# Patient Record
Sex: FEMALE | Race: WHITE | HISPANIC OR LATINO | Employment: UNEMPLOYED | URBAN - METROPOLITAN AREA
[De-identification: names, ages, dates, MRNs, and addresses within clinical notes are randomized per-mention and may not be internally consistent; named-entity substitution may affect disease eponyms.]

---

## 2018-01-14 NOTE — RESULT NOTES
Verified Results  * US ABDOMEN COMPLETE [id number] 11:00AM Ginna Middleton Order Number: [id number]    - Patient Instructions: To schedule this appointment, please contact Central Scheduling at [phone]  Test Name Result Flag Reference   US ABDOMEN COMPLETE (Report)     ABDOMEN ULTRASOUND, COMPLETE     INDICATION: Abdominal pain     COMPARISON: 11/26/2014     TECHNIQUE:  Real-time ultrasound of the abdomen was performed with a curvilinear transducer with both volumetric sweeps and still imaging techniques  FINDINGS:     PANCREAS: The pancreatic tail is obscured by overlying bowel gas  Visualized portions of the pancreas are within normal limits  AORTA AND IVC: Visualized portions are normal for patient age  LIVER:   Size: Within normal range  The liver measures 13 8 cm in the midclavicular line  Contour: Surface contour is smooth  Parenchyma: Echogenicity and echotexture are within normal limits  Again seen is a septated liver cyst measuring 4 7 x 3 3 x 2 7 cm  On a prior liver protocol CT from 2014, this measured 3 8 x 4 0 x 3 2 cm  There are no solid vascular components  The main portal vein is patent and hepatopetal       BILIARY:   The patient is status post cholecystectomy  No intrahepatic biliary dilatation  CBD measures 2 mm  No choledocholithiasis  KIDNEY:    Right kidney measures 11 7 x 4 7 cm  Within normal limits  Left kidney measures 11 5 x 5 2 cm  There is a midpole cyst measuring 1 2 x 1 0 x 1 1 cm  SPLEEN:    Measures 8 5 cm  Within normal limits  ASCITES: None         IMPRESSION:     1  Stable septated liver cyst      2  Small left renal cyst        Workstation performed: [id number]     Signed by:   Kay Reagan MD   10/25/16

## 2018-04-14 ENCOUNTER — HOSPITAL ENCOUNTER (EMERGENCY)
Facility: HOSPITAL | Age: 63
Discharge: HOME | End: 2018-04-14
Attending: EMERGENCY MEDICINE
Payer: COMMERCIAL

## 2018-04-14 ENCOUNTER — APPOINTMENT (EMERGENCY)
Dept: RADIOLOGY | Facility: HOSPITAL | Age: 63
End: 2018-04-14
Payer: COMMERCIAL

## 2018-04-14 ENCOUNTER — APPOINTMENT (EMERGENCY)
Dept: CT IMAGING | Facility: HOSPITAL | Age: 63
End: 2018-04-14
Payer: COMMERCIAL

## 2018-04-14 VITALS
BODY MASS INDEX: 25.68 KG/M2 | HEART RATE: 76 BPM | RESPIRATION RATE: 20 BRPM | TEMPERATURE: 98.7 F | WEIGHT: 154.32 LBS | OXYGEN SATURATION: 96 % | SYSTOLIC BLOOD PRESSURE: 137 MMHG | DIASTOLIC BLOOD PRESSURE: 87 MMHG

## 2018-04-14 DIAGNOSIS — K76.89 LIVER CYST: ICD-10-CM

## 2018-04-14 DIAGNOSIS — R10.9 ABDOMINAL PAIN: Primary | ICD-10-CM

## 2018-04-14 DIAGNOSIS — K21.9 GERD (GASTROESOPHAGEAL REFLUX DISEASE): ICD-10-CM

## 2018-04-14 DIAGNOSIS — K52.9 GASTROENTERITIS: ICD-10-CM

## 2018-04-14 LAB
ALBUMIN SERPL BCP-MCNC: 3.4 G/DL (ref 3.5–5)
ALP SERPL-CCNC: 107 U/L (ref 46–116)
ALT SERPL W P-5'-P-CCNC: 26 U/L (ref 12–78)
ANION GAP SERPL CALCULATED.3IONS-SCNC: 9 MMOL/L (ref 4–13)
APTT PPP: 33 SECONDS (ref 23–35)
AST SERPL W P-5'-P-CCNC: 16 U/L (ref 5–45)
ATRIAL RATE: 81 BPM
BACTERIA UR QL AUTO: ABNORMAL /HPF
BASOPHILS # BLD MANUAL: 0 THOUSAND/UL (ref 0–0.1)
BASOPHILS NFR MAR MANUAL: 0 % (ref 0–1)
BILIRUB SERPL-MCNC: 0.5 MG/DL (ref 0.2–1)
BILIRUB UR QL STRIP: NEGATIVE
BUN SERPL-MCNC: 14 MG/DL (ref 5–25)
CALCIUM SERPL-MCNC: 9.1 MG/DL
CHLORIDE SERPL-SCNC: 107 MMOL/L (ref 100–108)
CLARITY UR: CLEAR
CO2 SERPL-SCNC: 27 MMOL/L (ref 21–32)
COLOR UR: YELLOW
CREAT SERPL-MCNC: 0.84 MG/DL (ref 0.6–1.3)
EOSINOPHIL # BLD MANUAL: 0.09 THOUSAND/UL (ref 0–0.4)
EOSINOPHIL NFR BLD MANUAL: 1 % (ref 0–6)
ERYTHROCYTE [DISTWIDTH] IN BLOOD BY AUTOMATED COUNT: 14 % (ref 11.6–15.1)
GFR SERPL CREATININE-BSD FRML MDRD: 75 ML/MIN/1.73SQ M
GLUCOSE SERPL-MCNC: 92 MG/DL (ref 65–140)
GLUCOSE UR STRIP-MCNC: NEGATIVE MG/DL
HCT VFR BLD AUTO: 36.3 % (ref 34.8–46.1)
HGB BLD-MCNC: 11.8 G/DL (ref 11.5–15.4)
HGB UR QL STRIP.AUTO: ABNORMAL
INR PPP: 1.04 (ref 0.86–1.16)
KETONES UR STRIP-MCNC: NEGATIVE MG/DL
LACTATE SERPL-SCNC: 0.7 MMOL/L (ref 0.5–2)
LEUKOCYTE ESTERASE UR QL STRIP: NEGATIVE
LIPASE SERPL-CCNC: 93 U/L (ref 73–393)
LYMPHOCYTES # BLD AUTO: 3.05 THOUSAND/UL (ref 0.6–4.47)
LYMPHOCYTES # BLD AUTO: 33 % (ref 14–44)
MCH RBC QN AUTO: 29.7 PG (ref 26.8–34.3)
MCHC RBC AUTO-ENTMCNC: 32.5 G/DL (ref 31.4–37.4)
MCV RBC AUTO: 91 FL (ref 82–98)
MONOCYTES # BLD AUTO: 0.74 THOUSAND/UL (ref 0–1.22)
MONOCYTES NFR BLD: 8 % (ref 4–12)
NEUTROPHILS # BLD MANUAL: 5.35 THOUSAND/UL (ref 1.85–7.62)
NEUTS SEG NFR BLD AUTO: 58 % (ref 43–75)
NITRITE UR QL STRIP: NEGATIVE
NON-SQ EPI CELLS URNS QL MICRO: ABNORMAL /HPF
P AXIS: 23 DEGREES
PH UR STRIP.AUTO: 6 [PH] (ref 4.5–8)
PLATELET # BLD AUTO: 338 THOUSANDS/UL (ref 149–390)
PLATELET BLD QL SMEAR: ADEQUATE
PMV BLD AUTO: 10.1 FL (ref 8.9–12.7)
POTASSIUM SERPL-SCNC: 3.6 MMOL/L (ref 3.5–5.3)
PR INTERVAL: 134 MS
PROT SERPL-MCNC: 7.6 G/DL (ref 6.4–8.2)
PROT UR STRIP-MCNC: NEGATIVE MG/DL
PROTHROMBIN TIME: 13.9 SECONDS (ref 12.1–14.4)
QRS AXIS: -4 DEGREES
QRSD INTERVAL: 84 MS
QT INTERVAL: 356 MS
QTC INTERVAL: 406 MS
RBC # BLD AUTO: 3.97 MILLION/UL (ref 3.81–5.12)
RBC #/AREA URNS AUTO: ABNORMAL /HPF
SODIUM SERPL-SCNC: 143 MMOL/L (ref 136–145)
SP GR UR STRIP.AUTO: 1.02 (ref 1–1.03)
T WAVE AXIS: 18 DEGREES
TOTAL CELLS COUNTED SPEC: 100
UROBILINOGEN UR QL STRIP.AUTO: 0.2 E.U./DL
VENTRICULAR RATE: 78 BPM
WBC # BLD AUTO: 9.23 THOUSAND/UL (ref 4.31–10.16)
WBC #/AREA URNS AUTO: ABNORMAL /HPF

## 2018-04-14 PROCEDURE — 96376 TX/PRO/DX INJ SAME DRUG ADON: CPT

## 2018-04-14 PROCEDURE — 85027 COMPLETE CBC AUTOMATED: CPT | Performed by: EMERGENCY MEDICINE

## 2018-04-14 PROCEDURE — 96375 TX/PRO/DX INJ NEW DRUG ADDON: CPT

## 2018-04-14 PROCEDURE — 83605 ASSAY OF LACTIC ACID: CPT | Performed by: EMERGENCY MEDICINE

## 2018-04-14 PROCEDURE — 71046 X-RAY EXAM CHEST 2 VIEWS: CPT

## 2018-04-14 PROCEDURE — 81001 URINALYSIS AUTO W/SCOPE: CPT

## 2018-04-14 PROCEDURE — 96361 HYDRATE IV INFUSION ADD-ON: CPT

## 2018-04-14 PROCEDURE — 36415 COLL VENOUS BLD VENIPUNCTURE: CPT | Performed by: EMERGENCY MEDICINE

## 2018-04-14 PROCEDURE — 83690 ASSAY OF LIPASE: CPT | Performed by: EMERGENCY MEDICINE

## 2018-04-14 PROCEDURE — 93010 ELECTROCARDIOGRAM REPORT: CPT | Performed by: INTERNAL MEDICINE

## 2018-04-14 PROCEDURE — 85610 PROTHROMBIN TIME: CPT | Performed by: EMERGENCY MEDICINE

## 2018-04-14 PROCEDURE — 96374 THER/PROPH/DIAG INJ IV PUSH: CPT

## 2018-04-14 PROCEDURE — 93005 ELECTROCARDIOGRAM TRACING: CPT

## 2018-04-14 PROCEDURE — 99285 EMERGENCY DEPT VISIT HI MDM: CPT

## 2018-04-14 PROCEDURE — 85007 BL SMEAR W/DIFF WBC COUNT: CPT | Performed by: EMERGENCY MEDICINE

## 2018-04-14 PROCEDURE — 85730 THROMBOPLASTIN TIME PARTIAL: CPT | Performed by: EMERGENCY MEDICINE

## 2018-04-14 PROCEDURE — 80053 COMPREHEN METABOLIC PANEL: CPT | Performed by: EMERGENCY MEDICINE

## 2018-04-14 PROCEDURE — 87040 BLOOD CULTURE FOR BACTERIA: CPT | Performed by: EMERGENCY MEDICINE

## 2018-04-14 PROCEDURE — 74177 CT ABD & PELVIS W/CONTRAST: CPT

## 2018-04-14 RX ORDER — ESOMEPRAZOLE MAGNESIUM 40 MG/1
40 CAPSULE, DELAYED RELEASE ORAL
COMMUNITY
End: 2018-10-25

## 2018-04-14 RX ORDER — SODIUM CHLORIDE 9 MG/ML
125 INJECTION, SOLUTION INTRAVENOUS CONTINUOUS
Status: DISCONTINUED | OUTPATIENT
Start: 2018-04-14 | End: 2018-04-14 | Stop reason: HOSPADM

## 2018-04-14 RX ORDER — ONDANSETRON 2 MG/ML
4 INJECTION INTRAMUSCULAR; INTRAVENOUS ONCE
Status: COMPLETED | OUTPATIENT
Start: 2018-04-14 | End: 2018-04-14

## 2018-04-14 RX ORDER — MAGNESIUM HYDROXIDE/ALUMINUM HYDROXICE/SIMETHICONE 120; 1200; 1200 MG/30ML; MG/30ML; MG/30ML
20 SUSPENSION ORAL ONCE
Status: COMPLETED | OUTPATIENT
Start: 2018-04-14 | End: 2018-04-14

## 2018-04-14 RX ORDER — RISPERIDONE 0.5 MG/1
0.5 TABLET, FILM COATED ORAL 2 TIMES DAILY
COMMUNITY
End: 2018-10-25

## 2018-04-14 RX ORDER — LOSARTAN POTASSIUM 100 MG/1
TABLET ORAL
COMMUNITY
Start: 2014-04-20 | End: 2018-12-03 | Stop reason: SDUPTHER

## 2018-04-14 RX ORDER — DICYCLOMINE HCL 20 MG
20 TABLET ORAL EVERY 6 HOURS
Qty: 20 TABLET | Refills: 0 | Status: SHIPPED | OUTPATIENT
Start: 2018-04-14 | End: 2018-10-25

## 2018-04-14 RX ORDER — METOCLOPRAMIDE 10 MG/1
10 TABLET ORAL 4 TIMES DAILY
Qty: 28 TABLET | Refills: 0 | Status: SHIPPED | OUTPATIENT
Start: 2018-04-14 | End: 2018-04-21

## 2018-04-14 RX ORDER — TRAZODONE HYDROCHLORIDE 50 MG/1
50 TABLET ORAL
COMMUNITY
End: 2018-10-25

## 2018-04-14 RX ORDER — SUCRALFATE 1 G/1
1 TABLET ORAL 4 TIMES DAILY
Qty: 20 TABLET | Refills: 0 | Status: SHIPPED | OUTPATIENT
Start: 2018-04-14 | End: 2018-10-25

## 2018-04-14 RX ADMIN — ONDANSETRON 4 MG: 2 INJECTION INTRAMUSCULAR; INTRAVENOUS at 04:19

## 2018-04-14 RX ADMIN — ALUMINUM HYDROXIDE, MAGNESIUM HYDROXIDE, AND SIMETHICONE 20 ML: 200; 200; 20 SUSPENSION ORAL at 06:21

## 2018-04-14 RX ADMIN — LIDOCAINE HYDROCHLORIDE 15 ML: 20 SOLUTION ORAL; TOPICAL at 06:20

## 2018-04-14 RX ADMIN — SODIUM CHLORIDE 125 ML/HR: 0.9 INJECTION, SOLUTION INTRAVENOUS at 05:30

## 2018-04-14 RX ADMIN — FAMOTIDINE 20 MG: 10 INJECTION INTRAVENOUS at 04:19

## 2018-04-14 RX ADMIN — SODIUM CHLORIDE 1000 ML: 0.9 INJECTION, SOLUTION INTRAVENOUS at 04:13

## 2018-04-14 RX ADMIN — HYDROMORPHONE HYDROCHLORIDE 0.5 MG: 1 INJECTION, SOLUTION INTRAMUSCULAR; INTRAVENOUS; SUBCUTANEOUS at 06:21

## 2018-04-14 RX ADMIN — IOHEXOL 50 ML: 240 INJECTION, SOLUTION INTRATHECAL; INTRAVASCULAR; INTRAVENOUS; ORAL at 04:25

## 2018-04-14 RX ADMIN — IOHEXOL 100 ML: 350 INJECTION, SOLUTION INTRAVENOUS at 06:10

## 2018-04-14 RX ADMIN — HYDROMORPHONE HYDROCHLORIDE 0.5 MG: 1 INJECTION, SOLUTION INTRAMUSCULAR; INTRAVENOUS; SUBCUTANEOUS at 04:19

## 2018-04-14 NOTE — DISCHARGE INSTRUCTIONS
Dolor abdominal   LO QUE NECESITA SABER:   El dolor abdominal puede ser sordo, [place], o himanshu  Usted puede sentir dolor localizado en sarwat ese área del abdomen o en todo el abdomen  El dolor puede ser causado por sarwat afección cookie estreñimiento, sensibilidad o intoxicación alimentaria, infección o sarwat obstrucción  Asimismo, el dolor abdominal puede deberse a sarwat hernia, apendicitis o Gale Laughter  Las enfermedades del hígado, la vesícula o el riñón también pueden causar dolor abdominal  La causa del dolor abdominal puede ser desconocida  INSTRUCCIONES SOBRE EL JOSE [facility]O HOSPITAL[facility]IA:   Regrese a la zion de emergencias si:   · Usted comienza a sentir un dolor en el pecho o dificultad para respirar que antes no sentía  · Usted siente un dolor con pulsaciones en la parte superior del abdomen o en la parte inferior de la espalda que de repente se vuelve john  · El dolor se localiza en la parte inferior derecha del abdomen y [place] cuando se [place]  · Usted tiene fiebre por encima de los 100 4 °F (38 °C) o escalofríos  · Usted tiene vómitos y no puede retener líquidos ni alimentos en el estómago  · El dolor no mejora o empeora en las próximas 8 a 12 horas  · Usted nota sharda en bowers vómito o heces, o éstas tienen un aspecto negruzco y alquitranado  · Bowers piel o las partes serjio de clarita ojos se vuelven amarillentas  · Si usted es sarwat niurka y presenta abundante sangrado vaginal que no es bowers menstruación  Pregúntele a bowers Claryce Levans vitaminas y minerales son adecuados para usted  · Usted siente dolor en la parte inferior de la espalda  · Usted es Carline Ramming y tiene dolor en los testículos  · Siente dolor al Tatiana Markham  · Usted tiene preguntas o inquietudes acerca de bowers condición o cuidado  Acuda en 24 horas a sarwat damaso de seguimiento con bowers médico o cookie se le indique:  Anote clarita preguntas para que se acuerde de hacerlas camilla clarita visitas     Medicamentos:   · Corey Age ser administrados para calmar sandoval estómago y prevenir los vómitos o para disminuir el dolor  Pregunte cómo se debe chandana los analgésicos de forma avilez  · [place] clarita medicamentos cookie se le haya indicado  Consulte con sandoval médico si usted rufus que sandoval medicamento no le está ayudando o si presenta efectos secundarios  Infórmele si es alérgico a algún medicamento  Mantenga sarwat lista actualizada de los [place], las vitaminas y los productos herbales que jeanie  Incluya los siguientes datos de los medicamentos: cantidad, frecuencia y motivo de administración  Traiga con usted la lista o los envases de la píldoras a clarita citas de seguimiento  Lleve la lista de los medicamentos con usted en alejandro de sarwat emergencia  © 2017 [address]  Information is for End User's use only and may not be sold, redistributed or otherwise used for commercial purposes  All illustrations and images included in CareNotes® are the copyrighted property of A D A M , Inc  or Parth Galan  Esta información es sólo para uso en educación  Sandoval intención no es darle un consejo médico sobre enfermedades o tratamientos  Colsulte con sandoval Catherne Ryan farmacéutico antes de seguir cualquier régimen médico para saber si es seguro y efectivo para usted  Gastroenteritis   LO QUE NECESITA SABER:   La gastroenteritis, o gripe estomacal, es sarwat infección del estómago y los intestinos  INSTRUCCIONES SOBRE EL JOSE [facility]DRO HOSPITALARIA:   Llame al 911 en alejandro de presentar lo siguiente:   · Usted tiene dificultad para respirar o pulso acelerado  Regrese a la zion de emergencias si:   · Usted ve sharda en sandoval diarrea  · Usted no puede dejar de vomitar  · Usted no ha orinado en 12 horas  · Usted siente que se va a desmayar  Pregúntele a sandoval [place] vitaminas y minerales son adecuados para usted  · Usted tiene fiebre  · Usted continúa con vómitos o diarrea aún después del tratamiento      · Usted ve lombrices en sandoval diarrea  · Bowers boca u ojos están secos  Usted no está orinando tanto o con la misma frecuencia  · Usted tiene preguntas o inquietudes acerca de bowers condición o cuidado  Medicamentos:   · Medicamentos,  se pueden administrar para [employer] vómitos o la diarrea, disminuir los calambres abdominales o tratar sarwat infección  · [place] clarita medicamentos cookie se le haya indicado  Consulte con bowers médico si usted rufus que bowers medicamento no le está ayudando o si presenta efectos secundarios  Infórmele si es alérgico a cualquier medicamento  Mantenga sarwat lista actualizada de los [place], las vitaminas y los productos herbales que jeanie  Incluya los siguientes datos de los medicamentos: cantidad, frecuencia y motivo de administración  Traiga con usted la lista o los envases de la píldoras a clarita citas de seguimiento  Lleve la lista de los medicamentos con usted en alejandro de sarwat emergencia  El [place] de bowers síntomas:   · [place]al líquidos cookie se le haya indicado  Pregunte a bowers médico qué cantidad de líquido debe beber a diario y qué líquidos le recomienda  Es posible que también necesite chandana sarwat solución de rehidratación oral (SRO)  Sarwat SRO contiene la cantidad [place] de azúcar, sal y minerales en agua para reponer los líquidos corporales  · Consuma alimentos blandos  Cuando usted sienta [place], empiece a comer alimentos suaves y blandos  Ejemplos son los plátanos, sopas claras, vilma y puré de [place]  No consuma productos lácteos, alcohol, bebidas azucaradas, o bebidas con cafeína hasta que se sienta mejor  · Descanse el mayor tiempo posible  Cuando se empiece a sentir mejor, comience poco a poco a hacer más cada día  Evite la propagación de la gastroenteritis:  La gastroenteritis se puede propagar fácilmente  Manténgase usted, bowers jeb y clarita alrededores limpios para ayudar a evitar la propagación de la gastroenteritis:  · Lávese las semaj frecuentemente  Utilice agmomo y [place]   [address] usar el baño, cambiarle el pañal a un reggie o estornudar  Lávese las semaj antes de comer o preparar alimentos  · Limpie las superficies y lave la ropa con frecuencia  Lave sandoavl ropa y clarita toallas por separado del tk de la ropa  Limpie las superficies de sandoval hogar con limpiador antibacterial o con blanqueador  · Lave y cocine sagar los alimentos  Lave las verduras crudas antes de cocinar  [address] y [place]  No utilice los mismos platos para las andre crudas que para otros alimentos  Ponga en el refrigerador inmediatamente cualquier alimento que haya sobrado  · Esté alerta cuando usted vaya de campamento o cuando viaje  Solamente tome agua limpia  No tome agua de los kurt o tarik a menos que usted purifique o hierva el agua karlos  Cuando viaje, tome agua embotellada y no le ponga hielo  No coma fruta con la cáscara  No coma pescado crudo o andre que no están cocinadas completamente  Acuda a clarita consultas de control con sandoval médico según le indicaron  Anote clarita preguntas para que se acuerde de hacerlas camilla clarita visitas  © 2017 [address]  Information is for End User's use only and may not be sold, redistributed or otherwise used for commercial purposes  All illustrations and images included in CareNotes® are the copyrighted property of A D A M , Inc  or Parth Galan  Esta información es sólo para uso en educación  Sandoval intención no es darle un consejo médico sobre enfermedades o tratamientos  Colsulte con sandoval Catherne Ryan farmacéutico antes de seguir cualquier régimen médico para saber si es seguro y efectivo para usted  Enfermedad por reflujo gastroesofágico   LO QUE NECESITA SABER:   La enfermedad por reflujo gastroesofágico ([place]ick[place] Lax) ocurre cuando el ácido y los alimentos en el estómago regresan al esófago  La enfermedad por reflujo gastroesofágico es el reflujo que se produce más de [address] a la semana camilla varias semanas  Generalmente causa acidez y otros síntomas  La ERGE puede causar otros problemas de emerald con el tiempo si no es tratada  INSTRUCCIONES SOBRE EL JOSE ALEJANDRO HOSPITALARIA:   Regrese a la zion de emergencias si:   · Usted siente Alaina Come y no puede eructar o vomitar  · Usted siente dolor rea en el pecho y dificultad repentina para respirar  · Clarita evacuaciones intestinales son de color jolynn, con [place], o de apariencia alquitranada  · Bowers vómito parece cookie café molido o contiene sharda  Pregúntele a bowers [place] vitaminas y minerales son adecuados para usted  · Vomita grandes cantidades, o vomita con frecuencia  · Tiene dificultad para respirar después de vomitar  · Tiene dificultad para tragar o dolor al tragar  · Usted pierde peso sin proponérselo  · Clarita síntomas empeoran o no mejoran con el tratamiento  · Usted tiene preguntas o inquietudes acerca de bowers condición o cuidado  Medicamentos:   · Medicamentos,  se utilizan para disminuir el ácido [place] medicamentos también podrían usarse para ayudar a que bowers esfínter esofágico inferior y bowers estómago se contraigan (estrechen) más  · [place] clarita medicamentos cookie se le haya indicado  Consulte con bowers médico si usted rufus que bowers medicamento no le está ayudando o si presenta efectos secundarios  Infórmele si es alérgico a algún medicamento  Mantenga sarwat lista actualizada de los [place], las vitaminas y los productos herbales que jeanie  Incluya los siguientes datos de los medicamentos: cantidad, frecuencia y motivo de administración  Traiga con usted la lista o los envases de la píldoras a clarita citas de seguimiento  Lleve la lista de los medicamentos con usted en alejandro de sarwat emergencia  Control de la ERGE:   · No consuma alimentos o bebidas que puedan aumentar la [place]  Estos incluyen chocolate, menta, comidas fritas o grasosas, bebidas que contienen cafeína o bebidas gaseosas   Otros alimentos incluyen comidas picantes, cebollas, tomates y alimentos a base de tomate  No consuma alimentos y bebidas que puedan irritar sandoval esófago, cookie las frutas cítricas, los jugos y las bebidas alcohólicas  · No ingiera comidas abundantes  Cuando usted come [employer] a la vez, sandoval estómago necesita más ácido para digerirla  Consuma 6 comidas pequeñas al día en vez de 3 comidas grandes y coma lentamente  No consuma alimentos entre 2 y 3 horas antes de [place]  · Eleve la cabecera de sandoval cama  Coloque bloques de 6 pulgadas debajo de la cabecera de la estructura de sandoval cama  También podría usar más sarwat almohada para apoyar sandoval paco y hombros mientras duerme  · Mantenga un peso saludable  Si usted tiene sobrepeso, la pérdida de peso podría ayudar a aliviar los síntomas de la Valentina Rajas  · No fume  Fumar debilita el esfínter esofágico inferior y [place] el riesgo de Valentina Rajas  Pida información a sandoval médico si usted actualmente fuma y necesita ayuda para dejar de fumar  Los cigarrillos electrónicos o tabaco sin humo todavía contienen nicotina  Consulte con sandoval médico antes de [employer]  · No use ropa que [place] alrededor de la cintura  La ropa apretada puede ejercer presión [employer] y causar o empeorar los síntomas de la Valentina Rajas  Acuda a clarita consultas de control con sandoval médico según le indicaron  Anote clarita preguntas para que se acuerde de hacerlas camilla clarita visitas  © 2017 [facility] Information is for End User's use only and may not be sold, redistributed or otherwise used for commercial purposes  All illustrations and images included in CareNotes® are the copyrighted property of A D A M , Inc  or Parth Galan  Esta información es sólo para uso en educación  Sandoval intención no es darle un consejo médico sobre enfermedades o tratamientos  Colsulte con sandoval Vasquez Cranker farmacéutico antes de seguir cualquier régimen médico para saber si es seguro y efectivo para usted

## 2018-04-14 NOTE — ED NOTES
PAIN IMPROVED, spoke with pt daughter regarding clear liquid diet x 2 days and then bland diet with understanding of same     Cammie Rodriguez RN  04/14/18 [id number]

## 2018-04-14 NOTE — ED NOTES
Patient transported to [facility] [address], RN  04/14/18 5904

## 2018-04-19 LAB
BACTERIA BLD CULT: NORMAL
BACTERIA BLD CULT: NORMAL

## 2018-04-26 LAB
CHOLEST SERPL-MCNC: 176 MG/DL
CHOLEST/HDLC SERPL: 4.5 {RATIO}
HDLC SERPL-MCNC: 39 MG/DL
LDL/HDL RATIO (HISTORICAL): 3
LDLC SERPL CALC-MCNC: 118 MG/DL
TRIGL SERPL-MCNC: 94 MG/DL
VLDLC SERPL CALC-MCNC: 19 MG/DL (ref 0–40)

## 2018-07-17 ENCOUNTER — HOSPITAL ENCOUNTER (EMERGENCY)
Facility: HOSPITAL | Age: 63
Discharge: HOME | End: 2018-07-17
Attending: EMERGENCY MEDICINE
Payer: COMMERCIAL

## 2018-07-17 VITALS
OXYGEN SATURATION: 96 % | TEMPERATURE: 98.6 F | DIASTOLIC BLOOD PRESSURE: 80 MMHG | WEIGHT: 148 LBS | HEART RATE: 102 BPM | SYSTOLIC BLOOD PRESSURE: 127 MMHG | RESPIRATION RATE: 16 BRPM | BODY MASS INDEX: 24.63 KG/M2

## 2018-07-17 DIAGNOSIS — L04.0 ACUTE CERVICAL ADENITIS: ICD-10-CM

## 2018-07-17 DIAGNOSIS — J02.9 PHARYNGITIS: Primary | ICD-10-CM

## 2018-07-17 PROCEDURE — 99282 EMERGENCY DEPT VISIT SF MDM: CPT

## 2018-07-17 PROCEDURE — 96372 THER/PROPH/DIAG INJ SC/IM: CPT

## 2018-07-17 RX ORDER — DEXAMETHASONE SODIUM PHOSPHATE 10 MG/ML
10 INJECTION, SOLUTION INTRAMUSCULAR; INTRAVENOUS ONCE
Status: COMPLETED | OUTPATIENT
Start: 2018-07-17 | End: 2018-07-17

## 2018-07-17 RX ADMIN — DEXAMETHASONE SODIUM PHOSPHATE 10 MG: 10 INJECTION, SOLUTION INTRAMUSCULAR; INTRAVENOUS at 03:36

## 2018-07-17 RX ADMIN — PENICILLIN G BENZATHINE 1.2 MILLION UNITS: 1200000 INJECTION, SUSPENSION INTRAMUSCULAR at 03:36

## 2018-07-17 NOTE — DISCHARGE INSTRUCTIONS
Faringitis   LO QUE NECESITA SABER:   La faringitis o dolor de garganta es la inflamación de los tejidos y estructuras en bowers faringe (garganta)  La faringitis es generalmente causada por sarwat bacteria  También podría ser causada por un resfriado o el virus de la gripe  Otras causas incluyen el fumar, las alergias o el reflujo estomacal    INSTRUCCIONES SOBRE EL JOSE ALEJANDRO HOSPITALARIA:   Llame al 911 en alejandro de presentar lo siguiente:   · Usted tiene dificultad para respirar o tragar porque bowers garganta está inflamada o adolorida  Regrese a la zion de emergencias si:   · Usted está babeando porque le duele demasiado tragar  · Usted tiene fiebre por encima de 102? F (39?C) o le dura más de 3 días  · Usted está confundido  · Usted siente sabor a sharda en bowers garganta  Pregúntele a bowers Jyothi Dumont vitaminas y minerales son adecuados para usted  · Bowers dolor de garganta empeora  · Usted tiene un bulto adolorido en bowers garganta que no se tracy después de 5 días  · Evelin síntomas no mejoran después de 5 días  · Usted tiene preguntas o inquietudes acerca de bowers condición o cuidado  Medicamentos:  La faringitis viral desaparecerá por sí ese sin necesidad de tratamiento  Bowers dolor de garganta empezará a mejorar dentro de 3 a 5 días en ambos casos de infecciones virales o bacteriales  Es posible que usted necesite alguno de los siguientes:  · Antibióticos  tratan las infecciones bacteriales  · AINEs (Analgésicos antiinflamatorios no esteroides) cookie el ibuprofeno, ayudan a disminuir la inflamación, el dolor y la [place]  Los AINEs pueden causar sangrado estomacal o problemas renales en ciertas personas  Si usted jeanie un medicamento anticoagulante, siempre pregúntele a bowers médico si los CATHY son seguros para usted  Siempre robinson la etiqueta de onur medicamento y [place] instrucciones  · El acetaminofén  [place] el dolor y baja la fiebre  Está disponible sin receta médica   Pregunte la cantidad y la frecuencia con que debe tomarlos  [address]  El acetaminofén puede causar daño en el hígado cuando no se jeanie de forma correcta  · [place] clarita medicamentos cookie se le haya indicado  Consulte con bowers médico si usted rufus que bowers medicamento no le está ayudando o si presenta efectos secundarios  Infórmele si es alérgico a cualquier medicamento  Mantenga sarwat lista actualizada de los [place], las vitaminas y los productos herbales que jeanie  Incluya los siguientes datos de los medicamentos: cantidad, frecuencia y motivo de administración  Traiga con usted la lista o los envases de la píldoras a clarita citas de seguimiento  Lleve la lista de los medicamentos con usted en alejandro de sarwat emergencia  El [place] de bowers síntomas:   · Nathaniel gárgaras de agua con sal   Mezcle ¼ de cucharadita de sal en un vaso con 8 onzas de agua tibia y nathaniel gárgaras  [place] podría ayudar a reducir la inflamación en bowers garganta  · [address] se le haya indicado  Es posible que usted necesite ingerir mas líquidos de lo habitual  Los líquidos pueden ayudar a aliviar bowers garganta y prevenir la deshidratación  Pregunte cuánto líquido debe chandana cada día y cuáles líquidos son los más adecuados para usted  · Use un humidificador de vapor frío  para ayudar a humedecer el aire en bowers habitación y Francetta Rust bowers tos  · Alivie bowers garganta  con pastillas para la tos, hielo y alimentos blandos o helados de [place]  Prevenga la propagación de la faringitis:  Cúbrase la boca y Cristin [place] and Zeynep cuando tose o estornuda  No comparta alimentos o bebidas  Lávese las semaj frecuentemente  Utilice agua y [place]  Si no tiene agua y jabón disponibles, entonces puede usar un alcohol para semaj en gel  Acuda a clarita consultas de control con bowers médico según le indicaron  Anote clarita preguntas para que se acuerde de hacerlas camilla clarita visitas     © 2017 [address] V[address] Information is for End User's use only and may not be sold, redistributed or otherwise used for commercial purposes  All illustrations and images included in CareNotes® are the copyrighted property of A D A M , Inc  or Parth Galan  Esta información es sólo para uso en educación  Bowers intención no es darle un consejo médico sobre enfermedades o tratamientos  Colsulte con bowers Dewain Racer farmacéutico antes de seguir cualquier régimen médico para saber si es seguro y efectivo para usted  Adenitis   LO QUE NECESITA SABER:   La adenitis es sarwat condición que provoca que los ganglios linfáticos se inflamen y estén sensibles  Usted también podría tener fiebre  La adenitis es un signo de infección que usualmente es a causa de sarwat bacteria  INSTRUCCIONES SOBRE EL JOSE ALEJANDRO HOSPITALARIA:   Medicamentos:  Es posible que usted necesite alguno de los siguientes:  · Antibióticos  tratarán bowers infección bacteriana  · Los AINEs o el acetaminofén  le ayudarán a disminuir el dolor, la inflamación y la [place]  Estos medicamentos están disponibles sin receta médica  Los AINEs pueden causar sangrado estomacal o problemas renales en ciertas personas  Si usted esta tomando un anticoágulante,  siempre  pregunte si los AINEs son seguros para usted  Siempre robinson la etiqueta de onur medicamento y [place] instrucciones  No administre onur medicamento a niños menores de 6 meses de tj sin antes obtener la autorización de bowers médico      · [place] clarita medicamentos cookie se ant haya indicado  Consulte con bowers médico si usted rufus que bowers medicamento no le está ayudando o si presenta efectos secundarios  Infórmele si es alérgico a algún medicamento  Mantenga sarwat lista actualizada de los [place], las vitaminas y los productos herbales que jeanie  Incluya los siguientes datos de los medicamentos: cantidad, frecuencia y motivo de administración  Traiga con usted la lista o los envases de la píldoras a clarita citas de seguimiento  Lleve la lista de los medicamentos con usted en alejandro de sarwat emergencia    Programe sarwat damaso con bowers médico dentro de 2 días:  Es posible que a usted lo refieran a un dentista o que necesite más exámenes  Anote evelin preguntas para que se acuerde de hacerlas camilla evelin visitas  El manejo de sandoval síntomas:   · Aplique calor húmedo  en los ganglios linfáticos inflamados de 20 a 30 minutos cada 2 horas o cookie se le indique  El calor ayuda a disminuir el dolor y la inflamación  Usted puede hacer un paquete húmedo de calor remojando sarwat toalla pequeña en [place]  Deje que se enfríe hasta que pueda sostenerla con las coral de evelin [place]  Exprima el exceso de [place]  Coloque la toalla en sarwat bolsa de plástico y envuelva la bolsa con sarwat toalla seca alrededor de la bolsa  Coloque el paquete sobre evelin ganglios linfáticos inflamados  · Eleve sandoval paco y la parte superior de la espalda  Mantenga sandoval paco y parte superior de sandoval espalda elevadas cuando descansa, use un sillón reclinable  Coloque almohadas adicionales debajo de sandoval paco y bart cuando duerma en la cama  La elevación ayuda a disminuir la inflamación  Pregúntele a sandoval Jason Peel vitaminas y minerales son adecuados para usted  · Evelin síntomas no mejoran después de 10 días de [place]  · Usted tiene preguntas o inquietudes acerca de sandoval condición o cuidado  Regrese a la zion de emergencias si:   · Usted tiene enrojecimiento o inflamación nuevos o que empeoran  · Usted desarrolla un bulto mack y [place] podría gotear pus  · Usted tiene dificultad para respirar o tragar  © 2017 [address] Vogel Information is for End User's use only and may not be sold, redistributed or otherwise used for commercial purposes  All illustrations and images included in CareNotes® are the copyrighted property of A D A M , Inc  or Parth Galan  Esta información es sólo para uso en educación  Sandoval intención no es darle un consejo médico sobre enfermedades o tratamientos   Colsulte con sandoval Rajesh [place] farmacéutico antes de seguir cualquier Carisa Ordonez médico para saber si es seguro y efectivo para usted

## 2018-07-17 NOTE — ED PROVIDER NOTES
History  Chief Complaint   Patient presents with    Sore Throat     PT C/O SORE THROAT AND "LUMP" ON RIGHT SIDE  PAIN STARTED TODAY  ALSO C/O HEADACHE AND DIFFICULTY MOVING NECK     Patient is a 65year old female with worsening sore throat and right sided neck pain and swelling since yesterday  No documented fever  Pain radiates to head with headache  No N/V  No sob  Was last seen in this ED on 4/4/18 for abdominal pain  [facility] website checked on this patient and no Rx found  Declines pain medication here  Will tx for presumptive strep throat and not do rapid strep or cx  History provided by:  Patient and relative (daughter)      Prior to Admission Medications   Prescriptions Last Dose Informant Patient Reported? Taking?   dicyclomine (BENTYL) 20 mg tablet   No No   Sig: Take 1 tablet (20 mg total) by mouth every 6 (six) hours for 5 days For crampy abdominal pain   esomeprazole (NexIUM) 40 MG capsule   Yes No   Sig: Take 40 mg by mouth every morning before breakfast   losartan (COZAAR) 100 MG tablet   Yes No   Sig: Take by mouth   risperiDONE (RisperDAL) 0 5 mg tablet   Yes No   Sig: Take 0 5 mg by mouth 2 (two) times a day   sertraline (ZOLOFT) 50 mg tablet   Yes No   Sig: Take 50 mg by mouth daily   sucralfate (CARAFATE) 1 g tablet   No No   Sig: Take 1 tablet (1 g total) by mouth 4 (four) times a day for 5 days   traZODone (DESYREL) 50 mg tablet   Yes No   Sig: Take 50 mg by mouth daily at bedtime      Facility-Administered Medications: None       Past Medical History:   Diagnosis Date    Anxiety     Depression     Gastritis     GERD (gastroesophageal reflux disease)     Hypertension        Past Surgical History:   Procedure Laterality Date    CHOLECYSTECTOMY      HYSTERECTOMY         History reviewed  No pertinent family history  I have reviewed and agree with the history as documented      Social History   Substance Use Topics    Smoking status: Never Smoker    Smokeless tobacco: Never Used    Alcohol use No        Review of Systems   Constitutional: Negative for fever (No documented fever)  HENT: Positive for sore throat  Respiratory: Negative for shortness of breath  Gastrointestinal: Negative for nausea and vomiting  Musculoskeletal: Positive for neck pain  Neurological: Positive for headaches  Physical Exam  Physical Exam   Constitutional: She is oriented to person, place, and time  She appears well-developed and well-nourished  She appears distressed (mild)  Not toxic  HENT:   Right Ear: External ear normal    Left Ear: External ear normal    Oropharyngeal erythema and no exudate     Eyes: No scleral icterus  Neck: Normal range of motion  Neck supple  No tracheal deviation present  Cardiovascular: Regular rhythm and normal heart sounds  No murmur heard  Tachycardia  Pulmonary/Chest: Effort normal and breath sounds normal  No stridor  No respiratory distress  She has no wheezes  She has no rales  Abdominal: Soft  Bowel sounds are normal  There is no tenderness  Lymphadenopathy:     She has cervical adenopathy (R anterior tenderness)  Neurological: She is alert and oriented to person, place, and time  Skin: Skin is warm and dry  No rash noted  Nursing note and vitals reviewed        Vital Signs  ED Triage Vitals [07/17/18 0320]   Temperature Pulse Respirations Blood Pressure SpO2   98 6 °F (37 °C) 104 20 135/84 98 %      Temp Source Heart Rate Source Patient Position - Orthostatic VS BP Location FiO2 (%)   Oral -- -- -- --      Pain Score       Worst Possible Pain           Vitals:    07/17/18 0320   BP: 135/84   Pulse: 104       Visual Acuity      ED Medications  Medications   dexamethasone (PF) (DECADRON) injection 10 mg (not administered)   penicillin G benzathine (BICILLIN L-A) intramuscular injection 1 2 Million Units (not administered)       Diagnostic Studies  Results Reviewed     None                 No orders to display Procedures  Procedures       Phone Contacts  ED Phone Contact    ED Course                               [facility]  Number of Diagnoses or Management Options  Diagnosis management comments: DDx including but not limited to: pharyngitis, mononucleosis, viral illness; doubt epiglottitis, peritonsillar abscess, retropharyngeal abscess  Amount and/or Complexity of Data Reviewed  Decide to obtain previous medical records or to obtain history from someone other than the patient: yes  Obtain history from someone other than the patient: yes  Review and summarize past medical records: yes      CritCare Time    Disposition  Final diagnoses:   Pharyngitis   Acute cervical adenitis     Time reflects when diagnosis was documented in both MDM as applicable and the Disposition within this note     Time User Action Codes Description Comment    7/17/2018  3:34 AM Jaylene Rhodes [J02 [id number]] Pharyngitis     7/17/2018  3:34 AM Jaylene Rhodes [L04 0] Acute cervical adenitis       ED Disposition     ED Disposition Condition Comment    Discharge  Onur Ham discharge to home/self care  Condition at discharge: Stable        Follow-up Information     Follow up With Specialties Details Why Contact Info    Flor Camacho MD Family Medicine Call in 1 day motrin/tylenol for pain  Return sooner if increased pain, swelling, fever, vomiting, drooling, difficulty breathing  [address] [facility] [address]            Patient's Medications   Discharge Prescriptions    No medications on file     No discharge procedures on file      ED Provider  Electronically Signed by           Luis Castro MD  07/17/18 8189

## 2018-08-30 ENCOUNTER — APPOINTMENT (EMERGENCY)
Dept: CT IMAGING | Facility: HOSPITAL | Age: 63
End: 2018-08-30
Payer: COMMERCIAL

## 2018-08-30 ENCOUNTER — HOSPITAL ENCOUNTER (EMERGENCY)
Facility: HOSPITAL | Age: 63
Discharge: HOME | End: 2018-08-30
Attending: EMERGENCY MEDICINE | Admitting: EMERGENCY MEDICINE
Payer: COMMERCIAL

## 2018-08-30 VITALS
TEMPERATURE: 98.6 F | DIASTOLIC BLOOD PRESSURE: 80 MMHG | BODY MASS INDEX: 25.66 KG/M2 | HEART RATE: 78 BPM | WEIGHT: 154 LBS | HEIGHT: 65 IN | SYSTOLIC BLOOD PRESSURE: 111 MMHG | OXYGEN SATURATION: 99 % | RESPIRATION RATE: 16 BRPM

## 2018-08-30 DIAGNOSIS — N12 PYELONEPHRITIS: Primary | ICD-10-CM

## 2018-08-30 LAB
ALBUMIN SERPL BCP-MCNC: 3.1 G/DL (ref 3.5–5)
ALP SERPL-CCNC: 132 U/L (ref 46–116)
ALT SERPL W P-5'-P-CCNC: 24 U/L (ref 12–78)
ANION GAP SERPL CALCULATED.3IONS-SCNC: 7 MMOL/L (ref 4–13)
AST SERPL W P-5'-P-CCNC: 20 U/L (ref 5–45)
BACTERIA UR QL AUTO: ABNORMAL /HPF
BASOPHILS # BLD AUTO: 0.05 THOUSANDS/ΜL (ref 0–0.1)
BASOPHILS NFR BLD AUTO: 0 % (ref 0–1)
BILIRUB SERPL-MCNC: 0.8 MG/DL (ref 0.2–1)
BILIRUB UR QL STRIP: NEGATIVE
BUN SERPL-MCNC: 11 MG/DL (ref 5–25)
CALCIUM SERPL-MCNC: 8.8 MG/DL (ref 8.3–10.1)
CHLORIDE SERPL-SCNC: 106 MMOL/L (ref 100–108)
CK SERPL-CCNC: 43 U/L (ref 26–192)
CLARITY UR: CLEAR
CO2 SERPL-SCNC: 28 MMOL/L (ref 21–32)
COLOR UR: YELLOW
CREAT SERPL-MCNC: 0.84 MG/DL (ref 0.6–1.3)
EOSINOPHIL # BLD AUTO: 0.11 THOUSAND/ΜL (ref 0–0.61)
EOSINOPHIL NFR BLD AUTO: 1 % (ref 0–6)
ERYTHROCYTE [DISTWIDTH] IN BLOOD BY AUTOMATED COUNT: 13.5 % (ref 11.6–15.1)
GFR SERPL CREATININE-BSD FRML MDRD: 75 ML/MIN/1.73SQ M
GLUCOSE SERPL-MCNC: 101 MG/DL (ref 65–140)
GLUCOSE UR STRIP-MCNC: NEGATIVE MG/DL
HCT VFR BLD AUTO: 35 % (ref 34.8–46.1)
HGB BLD-MCNC: 11.4 G/DL (ref 11.5–15.4)
HGB UR QL STRIP.AUTO: ABNORMAL
IMM GRANULOCYTES # BLD AUTO: 0.04 THOUSAND/UL (ref 0–0.2)
IMM GRANULOCYTES NFR BLD AUTO: 0 % (ref 0–2)
KETONES UR STRIP-MCNC: NEGATIVE MG/DL
LEUKOCYTE ESTERASE UR QL STRIP: ABNORMAL
LIPASE SERPL-CCNC: 85 U/L (ref 73–393)
LYMPHOCYTES # BLD AUTO: 3.76 THOUSANDS/ΜL (ref 0.6–4.47)
LYMPHOCYTES NFR BLD AUTO: 29 % (ref 14–44)
MCH RBC QN AUTO: 30.1 PG (ref 26.8–34.3)
MCHC RBC AUTO-ENTMCNC: 32.6 G/DL (ref 31.4–37.4)
MCV RBC AUTO: 92 FL (ref 82–98)
MONOCYTES # BLD AUTO: 1.37 THOUSAND/ΜL (ref 0.17–1.22)
MONOCYTES NFR BLD AUTO: 11 % (ref 4–12)
NEUTROPHILS # BLD AUTO: 7.46 THOUSANDS/ΜL (ref 1.85–7.62)
NEUTS SEG NFR BLD AUTO: 59 % (ref 43–75)
NITRITE UR QL STRIP: POSITIVE
NON-SQ EPI CELLS URNS QL MICRO: ABNORMAL /HPF
NRBC BLD AUTO-RTO: 0 /100 WBCS
PH UR STRIP.AUTO: 5.5 [PH] (ref 4.5–8)
PLATELET # BLD AUTO: 348 THOUSANDS/UL (ref 149–390)
PMV BLD AUTO: 9.7 FL (ref 8.9–12.7)
POTASSIUM SERPL-SCNC: 3.9 MMOL/L (ref 3.5–5.3)
PROT SERPL-MCNC: 7.8 G/DL (ref 6.4–8.2)
PROT UR STRIP-MCNC: ABNORMAL MG/DL
RBC # BLD AUTO: 3.79 MILLION/UL (ref 3.81–5.12)
RBC #/AREA URNS AUTO: ABNORMAL /HPF
SODIUM SERPL-SCNC: 141 MMOL/L (ref 136–145)
SP GR UR STRIP.AUTO: 1.01 (ref 1–1.03)
UROBILINOGEN UR QL STRIP.AUTO: 0.2 E.U./DL
WBC # BLD AUTO: 12.79 THOUSAND/UL (ref 4.31–10.16)
WBC #/AREA URNS AUTO: ABNORMAL /HPF

## 2018-08-30 PROCEDURE — 87077 CULTURE AEROBIC IDENTIFY: CPT

## 2018-08-30 PROCEDURE — 74177 CT ABD & PELVIS W/CONTRAST: CPT

## 2018-08-30 PROCEDURE — 85025 COMPLETE CBC W/AUTO DIFF WBC: CPT | Performed by: EMERGENCY MEDICINE

## 2018-08-30 PROCEDURE — 80053 COMPREHEN METABOLIC PANEL: CPT | Performed by: EMERGENCY MEDICINE

## 2018-08-30 PROCEDURE — 87186 SC STD MICRODIL/AGAR DIL: CPT

## 2018-08-30 PROCEDURE — 36415 COLL VENOUS BLD VENIPUNCTURE: CPT | Performed by: EMERGENCY MEDICINE

## 2018-08-30 PROCEDURE — 83690 ASSAY OF LIPASE: CPT | Performed by: EMERGENCY MEDICINE

## 2018-08-30 PROCEDURE — 81001 URINALYSIS AUTO W/SCOPE: CPT

## 2018-08-30 PROCEDURE — 96365 THER/PROPH/DIAG IV INF INIT: CPT

## 2018-08-30 PROCEDURE — 99284 EMERGENCY DEPT VISIT MOD MDM: CPT

## 2018-08-30 PROCEDURE — 96361 HYDRATE IV INFUSION ADD-ON: CPT

## 2018-08-30 PROCEDURE — 87086 URINE CULTURE/COLONY COUNT: CPT

## 2018-08-30 PROCEDURE — 82550 ASSAY OF CK (CPK): CPT | Performed by: EMERGENCY MEDICINE

## 2018-08-30 PROCEDURE — 96375 TX/PRO/DX INJ NEW DRUG ADDON: CPT

## 2018-08-30 RX ORDER — NAPROXEN 375 MG/1
375 TABLET ORAL 2 TIMES DAILY WITH MEALS
Qty: 20 TABLET | Refills: 0 | Status: SHIPPED | OUTPATIENT
Start: 2018-08-30 | End: 2018-10-25

## 2018-08-30 RX ORDER — CEPHALEXIN 500 MG/1
500 CAPSULE ORAL 4 TIMES DAILY
Qty: 40 CAPSULE | Refills: 0 | Status: SHIPPED | OUTPATIENT
Start: 2018-08-30 | End: 2018-09-09

## 2018-08-30 RX ORDER — KETOROLAC TROMETHAMINE 30 MG/ML
15 INJECTION, SOLUTION INTRAMUSCULAR; INTRAVENOUS ONCE
Status: COMPLETED | OUTPATIENT
Start: 2018-08-30 | End: 2018-08-30

## 2018-08-30 RX ADMIN — KETOROLAC TROMETHAMINE 15 MG: 30 INJECTION, SOLUTION INTRAMUSCULAR at 04:47

## 2018-08-30 RX ADMIN — SODIUM CHLORIDE 1000 ML: 0.9 INJECTION, SOLUTION INTRAVENOUS at 04:40

## 2018-08-30 RX ADMIN — CEFTRIAXONE 1000 MG: 1 INJECTION, POWDER, FOR SOLUTION INTRAMUSCULAR; INTRAVENOUS at 04:48

## 2018-08-30 RX ADMIN — IOHEXOL 100 ML: 350 INJECTION, SOLUTION INTRAVENOUS at 05:32

## 2018-08-30 NOTE — DISCHARGE INSTRUCTIONS
Infección del riñón   LO QUE NECESITA SABER:   Sarwat infección de Malon Jacob, o pielonefritis es sarwat infección bacteriana  La infección usualmente comienza en bowers vejiga o uretra y se pasa a bowers [place]on [place]  [place] o ambos riñones podrían estar infectados  INSTRUCCIONES SOBRE EL JOSE ALEJANDRO HOSPITALARIA:   Regrese a la zion de emergencias si:   · Usted tiene fiebre o escalofríos  · Usted no puede dejar de vomitar  · Usted tiene dolor intenso en el abdomen, en la parte inferior de la espalda o en los costados  Pregúntele a bowers Sherol Mince vitaminas y minerales son adecuados para usted  · Usted continúa teniendo fiebre después de chandana los antibióticos por 3 días  · Usted siente dolor al orinar, incluso después del tratamiento  · Evelin signos y síntomas regresan  · Usted tiene preguntas o inquietudes acerca de bowers condición o cuidado  Medicamentos:  Es posible que usted necesite alguno de los siguientes:  · Antibióticos  tratan bowers infección bacteriana  · Acetaminofeno:  tracy el dolor y baja la fiebre  Está disponible sin receta médica  Pregunte la cantidad y la frecuencia con que debe tomarlos  [address]  [place] las etiquetas de todos los demás medicamentos que esté usando para saber si también contienen acetaminofén, o pregunte a bowers médico o farmacéutico  El acetaminofén puede causar daño en el hígado cuando no se jeanie de forma correcta  No use más de 4 gramos (4000 miligramos) en total de acetaminofeno en un día  · AINEs (Analgésicos antiinflamatorios no esteroides) cookie el ibuprofeno, ayudan a disminuir la inflamación, el dolor y la [place]  Onur medicamento esta disponible con o sin sarwat receta médica  Los AINEs pueden causar sangrado estomacal o problemas renales en ciertas personas  Si usted esta tomando un anticoágulante,  siempre  pregunte si los AINEs son seguros para usted  Siempre yumiko la etiqueta de onur medicamento y [place] instrucciones   No administre onur medicamento a niños menores de 6 meses de tj sin antes obtener la autorización de bowers médico      · Un medicamento con receta para el dolor  podrían ser Irl Brakeman  Pregunte cómo chandana estos medicamentos de sarwat forma avilez  · [place] clarita medicamentos cookie se le haya indicado  Consulte con bowers médico si usted rufus que bowers medicamento no le está ayudando o si presenta efectos secundarios  Infórmele si es alérgico a algún medicamento  Mantenga sarwat lista actualizada de los [place], las vitaminas y los productos herbales que jeanie  Incluya los siguientes datos de los medicamentos: cantidad, frecuencia y motivo de administración  Traiga con usted la lista o los envases de la píldoras a clarita citas de seguimiento  Lleve la lista de los medicamentos con usted en alejandro de sarwat emergencia  [place] líquidos según clarita indicaciones:  Es posible que usted necesite chandana líquidos adicionales para ayudar a limpiar clarita riñones y bowers sistema urinario  El agua es el mejor líquido para chandana  Pregunte a bowers médico sobre la cantidad de líquido que necesita chandana todos los días y cuáles le recomienda  Orine bobby pronto cookie sienta la necesidad de hacerlo:  [place] ayudará a eliminar las bacterias de bowers sistema urinario  No espere ni aguante bowers orina por [employer]  Limpie el área de los genitales a diario con agua y [place]  Límpiese de adelante hacia atrás después de orinar o de tener sarwat evacuación intestinal  Use ropa interior de algodón  Las telas cookie el nylon y el poliéster pueden [employer]  [place] puede aumentar bowers riesgo de sarwat infección  Orine dentro de 15 minutos después de satya tenido [employer]  Acuda a clarita consultas de control con bowers médico según le indicaron  Anote clarita preguntas para que se acuerde de hacerlas camilla clarita visitas  © 2017 [address] [employer] Information is for End User's use only and may not be sold, redistributed or otherwise used for commercial purposes   All illustrations and images included in [place] are the copyrighted property of A D A M , Inc  or Parth Galan  Esta información es sólo para uso en educación  Bowers intención no es darle un consejo médico sobre enfermedades o tratamientos  Colsulte con bowers Angel So farmacéutico antes de seguir cualquier régimen médico para saber si es seguro y efectivo para usted

## 2018-08-30 NOTE — ED NOTES
Education provided at length on newly prescribed antibiotics, and overall care for infection of kidneys  Reviewed s/s to return to emergency care or seek f/u with provider  Offered no further questions at time of discharge        Sharyn Gilford, RN  08/30/18 0010

## 2018-08-30 NOTE — ED PROVIDER NOTES
History  Chief Complaint   Patient presents with    Difficulty Urinating     Pt presents to ED c/o dysuria since sunday  Pt also reports left sided flank pain radiating across lower abdomen  (-)fevers (-)hematuria      Patient is a 65year old female with dysuria since this past  Sunday and left flank pain with radiation to abdomen since yesterday  No prior episodes  No h/o kidney stones  No fever  No N/V  Has had prior ccy and hysterectomy  Did not drive here  Was last seen in this ED on 7/17/18 for pharyngitis  [facility] [place] checked on this patient and no Rx found  History provided by:  Patient and relative (daughter)      Prior to Admission Medications   Prescriptions Last Dose Informant Patient Reported? Taking?   dicyclomine (BENTYL) 20 mg tablet   No No   Sig: Take 1 tablet (20 mg total) by mouth every 6 (six) hours for 5 days For crampy abdominal pain   esomeprazole (NexIUM) 40 MG capsule   Yes No   Sig: Take 40 mg by mouth every morning before breakfast   losartan (COZAAR) 100 MG tablet   Yes No   Sig: Take by mouth   risperiDONE (RisperDAL) 0 5 mg tablet   Yes No   Sig: Take 0 5 mg by mouth 2 (two) times a day   sertraline (ZOLOFT) 50 mg tablet   Yes No   Sig: Take 50 mg by mouth daily   sucralfate (CARAFATE) 1 g tablet   No No   Sig: Take 1 tablet (1 g total) by mouth 4 (four) times a day for 5 days   traZODone (DESYREL) 50 mg tablet   Yes No   Sig: Take 50 mg by mouth daily at bedtime      Facility-Administered Medications: None       Past Medical History:   Diagnosis Date    Anxiety     Depression     Fibromyalgia     Gastritis     GERD (gastroesophageal reflux disease)     Hypertension        Past Surgical History:   Procedure Laterality Date    CHOLECYSTECTOMY      HYSTERECTOMY         History reviewed  No pertinent family history  I have reviewed and agree with the history as documented      Social History   Substance Use Topics    Smoking status: Never Smoker    Smokeless tobacco: Never Used    Alcohol use No        Review of Systems   Constitutional: Negative for fever  Gastrointestinal: Positive for abdominal pain  Negative for nausea and vomiting  Genitourinary: Positive for dysuria and flank pain  All other systems reviewed and are negative  Physical Exam  Physical Exam   Constitutional: She is oriented to person, place, and time  She appears well-developed and well-nourished  She appears distressed (moderate)  HENT:   Head: Normocephalic and atraumatic  Moist mucous membranes   Eyes: No scleral icterus  Neck: No tracheal deviation present  Cardiovascular: Normal rate, regular rhythm and normal heart sounds  No murmur heard  Pulmonary/Chest: Effort normal and breath sounds normal  No stridor  No respiratory distress  Abdominal: Soft  Bowel sounds are normal  She exhibits no distension  There is no tenderness  There is no rebound and no guarding  L CVAT  Musculoskeletal: She exhibits no edema or deformity  Neurological: She is alert and oriented to person, place, and time  Skin: Skin is warm and dry  No rash noted  Psychiatric: She has a normal mood and affect  Nursing note and vitals reviewed        Vital Signs  ED Triage Vitals [08/30/18 0400]   Temperature Pulse Respirations Blood Pressure SpO2   98 6 °F (37 °C) 88 18 110/73 96 %      Temp Source Heart Rate Source Patient Position - Orthostatic VS BP Location FiO2 (%)   Oral Monitor Sitting Right arm --      Pain Score       Worst Possible Pain           Vitals:    08/30/18 0400 08/30/18 0547   BP: 110/73 112/76   Pulse: 88 82   Patient Position - Orthostatic VS: Sitting Sitting       Visual Acuity      ED Medications  Medications   sodium chloride 0 9 % bolus 1,000 mL (1,000 mL Intravenous New Bag 8/30/18 0440)   ketorolac (TORADOL) injection 15 mg (15 mg Intravenous Given 8/30/18 0447)   ceftriaxone (ROCEPHIN) 1 g/50 mL in dextrose IVPB (0 mg Intravenous Stopped 8/30/18 0523)   iohexol (OMNIPAQUE) 350 MG/ML injection (MULTI-DOSE) 100 mL (100 mL Intravenous Given 8/30/18 0532)       Diagnostic Studies  Results Reviewed     Procedure Component Value Units Date/Time    Comprehensive metabolic panel [[id number]]  (Abnormal) Collected:  08/30/18 0422    Lab Status:  Final result Specimen:  Blood from Arm, Right Updated:  08/30/18 0028     Sodium 141 mmol/L      Potassium 3 9 mmol/L      Chloride 106 mmol/L      CO2 28 mmol/L      ANION GAP 7 mmol/L      BUN 11 mg/dL      Creatinine 0 84 mg/dL      Glucose 101 mg/dL      Calcium 8 8 mg/dL      AST 20 U/L      ALT 24 U/L      Alkaline Phosphatase 132 (H) U/L      Total Protein 7 8 g/dL      Albumin 3 1 (L) g/dL      Total Bilirubin 0 80 mg/dL      eGFR 75 ml/min/1 73sq m     Narrative:         National Kidney Disease Education Program recommendations are as follows:  GFR calculation is accurate only with a steady state creatinine  Chronic Kidney disease less than 60 ml/min/1 73 sq  meters  Kidney failure less than 15 ml/min/1 73 sq  meters  CK Total with Reflex CKMB [[id number]]  (Normal) Collected:  08/30/18 0422    Lab Status:  Final result Specimen:  Blood from Arm, Right Updated:  08/30/18 0508     Total CK 43 U/L     Urine Microscopic [[id number]]  (Abnormal) Collected:  08/30/18 0446    Lab Status:  Final result Specimen:  Urine from Urine, Clean Catch Updated:  08/30/18 0502     RBC, UA 0-1 (A) /hpf      WBC, UA Innumerable (A) /hpf      Epithelial Cells Occasional /hpf      Bacteria, UA Innumerable (A) /hpf     Urine culture [[id number]] Collected:  08/30/18 0446    Lab Status:   In process Specimen:  Urine from Urine, Clean Catch Updated:  08/30/18 0501    Lipase [[id number]]  (Normal) Collected:  08/30/18 0422    Lab Status:  Final result Specimen:  Blood from Arm, Right Updated:  08/30/18 0447     Lipase 85 u/L     ED Urine Macroscopic [[id number]]  (Abnormal) Collected:  08/30/18 0446    Lab Status:  Final result Specimen:  Urine Updated:  08/30/18 0435     Color, UA Yellow     Clarity, UA Clear     pH, UA 5 5     Leukocytes, UA Large (A)     Nitrite, UA Positive (A)     Protein, UA 30 (1+) (A) mg/dl      Glucose, UA Negative mg/dl      Ketones, UA Negative mg/dl      Urobilinogen, UA 0 2 E U /dl      Bilirubin, UA Negative     Blood, UA Moderate (A)     Specific [place], UA 1 010    Narrative:       CLINITEK RESULT    CBC and differential [[id number]]  (Abnormal) Collected:  08/30/18 0422    Lab Status:  Final result Specimen:  Blood from Arm, Right Updated:  08/30/18 0430     WBC 12 79 (H) Thousand/uL      RBC 3 79 (L) Million/uL      Hemoglobin 11 4 (L) g/dL      Hematocrit 35 0 %      MCV 92 fL      MCH 30 1 pg      MCHC 32 6 g/dL      RDW 13 5 %      MPV 9 7 fL      Platelets 801 Thousands/uL      nRBC 0 /100 WBCs      Neutrophils Relative 59 %      Immat GRANS % 0 %      Lymphocytes Relative 29 %      Monocytes Relative 11 %      Eosinophils Relative 1 %      Basophils Relative 0 %      Neutrophils Absolute 7 46 Thousands/µL      Immature Grans Absolute 0 04 Thousand/uL      Lymphocytes Absolute 3 76 Thousands/µL      Monocytes Absolute 1 37 (H) Thousand/µL      Eosinophils Absolute 0 11 Thousand/µL      Basophils Absolute 0 05 Thousands/µL                  CT abdomen pelvis with contrast   ED Interpretation by Yokasta Alfonso MD (08/30 0600)   FINDINGS:      ABDOMEN      LOWER CHEST:  Atelectatic changes are noted at the lung bases   The heart is enlarged   Coronary artery calcifications  LIVER/BILIARY TREE:  Stable hepatic cysts  GALLBLADDER:  Surgically absent  SPLEEN:  Unremarkable  PANCREAS:  Unremarkable  ADRENAL GLANDS:  Unremarkable  KIDNEYS/URETERS:     Symmetric enhancement of the kidneys  Mild bilateral hydroureteronephrosis   No obstructing calculi are seen along the course of either ureter   There is mild enhancement of the ureters and renal pelves bilaterally        STOMACH AND BOWEL:     Evaluation limited due to lack of oral contrast material       Hiatal hernia   Stomach decompressed  No evidence of small bowel obstruction  Moderate amount of feces in the colon   Scattered diverticula throughout the descending colon and sigmoid colon   Nothing to suggest acute diverticulitis  APPENDIX:  No findings to suggest appendicitis  ABDOMINOPELVIC CAVITY:  No ascites or free intraperitoneal air  No lymphadenopathy  VESSELS:  Unremarkable for patient's age  PELVIS      REPRODUCTIVE ORGANS:  Surgically absent  URINARY BLADDER:  Decompressed  Rhodia Resides is mild circumferential bladder wall thickening   Mild inflammatory changes are present surrounding the urinary bladder  ABDOMINAL WALL/INGUINAL REGIONS:  Small umbilical hernia containing fat  OSSEOUS STRUCTURES:  No acute fracture or destructive osseous lesion   Degenerative changes in the lumbar spine and bilateral hips  Impression:     Abnormal appearance of the kidneys and urinary bladder, most compatible with pyelonephritis and cystitis   No obstructing renal calculi  The study was marked in [facility] for immediate notification  Workstation performed: [id number]         Final Result by Chay Pineda DO (08/30 8610)   Abnormal appearance of the kidneys and urinary bladder, most compatible with pyelonephritis and cystitis  No obstructing renal calculi  The study was marked in [facility] for immediate notification  Workstation performed: [id number]                    Procedures  Procedures       Phone Contacts  ED Phone Contact    ED Course  ED Course as of Aug 30 0617   Thu Aug 30, 2018   0440 CBC and UA d/w patient and daughter  IV rocephin ordered  9162 Rest of labs d/w patient and daughter  Pain improved  8249 CT d/w patient and daughter                                   MDM  Number of Diagnoses or Management Options  Diagnosis management comments: DDx including but not limited to: renal colic, pyelonephritis, UTI, GI etiology, appendicitis, diverticulitis, pancreatitis, doubt AAA, rhabdomyolysis, tumor  Amount and/or Complexity of Data Reviewed  Clinical lab tests: ordered and reviewed  Tests in the radiology section of CPT®: ordered and reviewed  Decide to obtain previous medical records or to obtain history from someone other than the patient: yes  Obtain history from someone other than the patient: yes  Review and summarize past medical records: yes      CritCare Time    Disposition  Final diagnoses:   Pyelonephritis     Time reflects when diagnosis was documented in both MDM as applicable and the Disposition within this note     Time User Action Codes Description Comment    8/30/2018  6:16 AM Veronica Freed Add [N12] Pyelonephritis       ED Disposition     ED Disposition Condition Comment    Discharge  Crissy Gibbons discharge to home/self care  Condition at discharge: Stable        Follow-up Information     Follow up With Specialties Details Why Contact Info    Tahir Aranda MD Family Medicine Call in 1 day Return sooner if increased pain, fever, vomiting, rash, weakness, dizziness, difficulty urinating or breathing  [facility]            Patient's Medications   Discharge Prescriptions    CEPHALEXIN (KEFLEX) 500 MG CAPSULE    Take 1 capsule (500 mg total) by mouth 4 (four) times a day for 10 days       Start Date: 8/30/2018 End Date: 9/9/2018       Order Dose: 500 mg       Quantity: 40 capsule    Refills: 0    NAPROXEN (NAPROSYN) 375 MG TABLET    Take 1 tablet (375 mg total) by mouth 2 (two) times a day with meals       Start Date: 8/30/2018 End Date: --       Order Dose: 375 mg       Quantity: 20 tablet    Refills: 0     No discharge procedures on file      ED Provider  Electronically Signed by           Annabelle Lipscomb MD  08/30/18 7569

## 2018-08-31 ENCOUNTER — TELEPHONE (OUTPATIENT)
Dept: FAMILY MEDICINE CLINIC | Facility: CLINIC | Age: 63
End: 2018-08-31

## 2018-09-01 ENCOUNTER — APPOINTMENT (OUTPATIENT)
Dept: LAB | Facility: HOSPITAL | Age: 63
End: 2018-09-01
Payer: COMMERCIAL

## 2018-09-01 ENCOUNTER — TRANSCRIBE ORDERS (OUTPATIENT)
Dept: LAB | Facility: HOSPITAL | Age: 63
End: 2018-09-01

## 2018-09-01 DIAGNOSIS — Z79.899 NEED FOR PROPHYLACTIC CHEMOTHERAPY: ICD-10-CM

## 2018-09-01 DIAGNOSIS — E88.81 DYSMETABOLIC SYNDROME X: ICD-10-CM

## 2018-09-01 DIAGNOSIS — E88.81 DYSMETABOLIC SYNDROME X: Primary | ICD-10-CM

## 2018-09-01 LAB
ALBUMIN SERPL BCP-MCNC: 3.3 G/DL (ref 3.5–5)
ALP SERPL-CCNC: 138 U/L (ref 46–116)
ALT SERPL W P-5'-P-CCNC: 22 U/L (ref 12–78)
ANION GAP SERPL CALCULATED.3IONS-SCNC: 5 MMOL/L (ref 4–13)
AST SERPL W P-5'-P-CCNC: 14 U/L (ref 5–45)
BACTERIA UR CULT: ABNORMAL
BASOPHILS # BLD AUTO: 0.04 THOUSANDS/ΜL (ref 0–0.1)
BASOPHILS NFR BLD AUTO: 1 % (ref 0–1)
BILIRUB SERPL-MCNC: 0.54 MG/DL (ref 0.2–1)
BUN SERPL-MCNC: 12 MG/DL (ref 5–25)
CALCIUM SERPL-MCNC: 8.7 MG/DL (ref 8.3–10.1)
CHLORIDE SERPL-SCNC: 105 MMOL/L (ref 100–108)
CHOLEST SERPL-MCNC: 223 MG/DL (ref 50–200)
CO2 SERPL-SCNC: 28 MMOL/L (ref 21–32)
CREAT SERPL-MCNC: 0.74 MG/DL (ref 0.6–1.3)
EOSINOPHIL # BLD AUTO: 0.12 THOUSAND/ΜL (ref 0–0.61)
EOSINOPHIL NFR BLD AUTO: 2 % (ref 0–6)
ERYTHROCYTE [DISTWIDTH] IN BLOOD BY AUTOMATED COUNT: 13.4 % (ref 11.6–15.1)
GFR SERPL CREATININE-BSD FRML MDRD: 88 ML/MIN/1.73SQ M
GLUCOSE P FAST SERPL-MCNC: 83 MG/DL (ref 65–99)
HCT VFR BLD AUTO: 38.4 % (ref 34.8–46.1)
HDLC SERPL-MCNC: 37 MG/DL (ref 40–60)
HGB BLD-MCNC: 12.2 G/DL (ref 11.5–15.4)
IMM GRANULOCYTES # BLD AUTO: 0.01 THOUSAND/UL (ref 0–0.2)
IMM GRANULOCYTES NFR BLD AUTO: 0 % (ref 0–2)
LDLC SERPL CALC-MCNC: 168 MG/DL (ref 0–100)
LYMPHOCYTES # BLD AUTO: 1.94 THOUSANDS/ΜL (ref 0.6–4.47)
LYMPHOCYTES NFR BLD AUTO: 28 % (ref 14–44)
MCH RBC QN AUTO: 29.7 PG (ref 26.8–34.3)
MCHC RBC AUTO-ENTMCNC: 31.8 G/DL (ref 31.4–37.4)
MCV RBC AUTO: 93 FL (ref 82–98)
MONOCYTES # BLD AUTO: 0.85 THOUSAND/ΜL (ref 0.17–1.22)
MONOCYTES NFR BLD AUTO: 12 % (ref 4–12)
NEUTROPHILS # BLD AUTO: 4.08 THOUSANDS/ΜL (ref 1.85–7.62)
NEUTS SEG NFR BLD AUTO: 57 % (ref 43–75)
NONHDLC SERPL-MCNC: 186 MG/DL
NRBC BLD AUTO-RTO: 0 /100 WBCS
PLATELET # BLD AUTO: 397 THOUSANDS/UL (ref 149–390)
PMV BLD AUTO: 9.8 FL (ref 8.9–12.7)
POTASSIUM SERPL-SCNC: 3.5 MMOL/L (ref 3.5–5.3)
PROLACTIN SERPL-MCNC: 7.5 NG/ML
PROT SERPL-MCNC: 8.3 G/DL (ref 6.4–8.2)
RBC # BLD AUTO: 4.11 MILLION/UL (ref 3.81–5.12)
SODIUM SERPL-SCNC: 138 MMOL/L (ref 136–145)
TRIGL SERPL-MCNC: 92 MG/DL
TSH SERPL DL<=0.05 MIU/L-ACNC: 2.41 UIU/ML (ref 0.36–3.74)
WBC # BLD AUTO: 7.04 THOUSAND/UL (ref 4.31–10.16)

## 2018-09-01 PROCEDURE — 84146 ASSAY OF PROLACTIN: CPT

## 2018-09-01 PROCEDURE — 85025 COMPLETE CBC W/AUTO DIFF WBC: CPT

## 2018-09-01 PROCEDURE — 80061 LIPID PANEL: CPT

## 2018-09-01 PROCEDURE — 84443 ASSAY THYROID STIM HORMONE: CPT

## 2018-09-01 PROCEDURE — 80053 COMPREHEN METABOLIC PANEL: CPT

## 2018-09-01 PROCEDURE — 36415 COLL VENOUS BLD VENIPUNCTURE: CPT

## 2018-09-05 NOTE — TELEPHONE ENCOUNTER
Patient has been called and I left message for patient to call the office to schedule a follow up visit

## 2018-10-06 DIAGNOSIS — E78.2 MIXED HYPERLIPIDEMIA: Primary | ICD-10-CM

## 2018-10-08 RX ORDER — ATORVASTATIN CALCIUM 10 MG/1
TABLET, FILM COATED ORAL
Qty: 30 TABLET | Refills: 0 | Status: SHIPPED | OUTPATIENT
Start: 2018-10-08 | End: 2019-10-14 | Stop reason: SDDI

## 2018-10-25 ENCOUNTER — ANNUAL EXAM (OUTPATIENT)
Dept: OBGYN CLINIC | Facility: CLINIC | Age: 63
End: 2018-10-25
Payer: COMMERCIAL

## 2018-10-25 VITALS
HEIGHT: 65 IN | WEIGHT: 145 LBS | BODY MASS INDEX: 24.16 KG/M2 | SYSTOLIC BLOOD PRESSURE: 126 MMHG | DIASTOLIC BLOOD PRESSURE: 74 MMHG

## 2018-10-25 DIAGNOSIS — Z01.419 WOMEN'S ANNUAL ROUTINE GYNECOLOGICAL EXAMINATION: Primary | ICD-10-CM

## 2018-10-25 DIAGNOSIS — Z12.31 ENCOUNTER FOR SCREENING MAMMOGRAM FOR BREAST CANCER: ICD-10-CM

## 2018-10-25 PROBLEM — R79.89 ELEVATED LIVER FUNCTION TESTS: Status: ACTIVE | Noted: 2018-10-25

## 2018-10-25 PROBLEM — R79.89 LOW VITAMIN D LEVEL: Status: ACTIVE | Noted: 2018-10-25

## 2018-10-25 PROBLEM — K21.9 ESOPHAGEAL REFLUX: Status: ACTIVE | Noted: 2018-10-25

## 2018-10-25 PROBLEM — K31.84 GASTROPARESIS: Status: ACTIVE | Noted: 2017-08-22

## 2018-10-25 PROBLEM — E78.5 HYPERLIPIDEMIA: Status: ACTIVE | Noted: 2018-10-25

## 2018-10-25 PROBLEM — N83.209 OVARIAN CYST: Status: ACTIVE | Noted: 2018-10-25

## 2018-10-25 PROBLEM — F32.A DEPRESSION: Status: ACTIVE | Noted: 2018-10-25

## 2018-10-25 PROBLEM — M85.80 OSTEOPENIA: Status: ACTIVE | Noted: 2018-10-25

## 2018-10-25 PROCEDURE — G0145 SCR C/V CYTO,THINLAYER,RESCR: HCPCS | Performed by: NURSE PRACTITIONER

## 2018-10-25 PROCEDURE — 99386 PREV VISIT NEW AGE 40-64: CPT | Performed by: NURSE PRACTITIONER

## 2018-10-25 RX ORDER — HYDROXYZINE 50 MG/1
50 TABLET, FILM COATED ORAL
Refills: 0 | COMMUNITY
Start: 2018-10-09

## 2018-10-25 RX ORDER — OMEPRAZOLE 10 MG/1
10 CAPSULE, DELAYED RELEASE ORAL DAILY
COMMUNITY
End: 2018-12-03

## 2018-10-25 NOTE — PROGRESS NOTES
Subjective  HPI:     Chantale Salcedo is a 65 y o  female, G 3 P3 with 3 prior vaginal deliveries  Hx post menopausal  History of hysterectomy with bilateral oophorectomy  She is not sexually active for 20 years  She complains of having had two UTI   Denies GI and Gyn complaints  Her depression is stable on her Zoloft  Medical, surgical and family history reviewed  She has a sister with history of breast CA and a sister with a hx of ovarian CA  Her dental care is up-to-date  She is up-to-date on her mammogram and colonoscopy  Gynecologic History    No LMP recorded  Patient is postmenopausal   Last Pap: unknown  Last mammogram: 10/1/18  Results were: normal  Colonoscopy: 2016 - benign polyps  Due in 2 years for repeat  DXA scan:  - low bone mass (Osteopenia)     Obstetric History    OB History    Para Term  AB Living   3 3       3   SAB TAB Ectopic Multiple Live Births           3      # Outcome Date GA Lbr Fredi/2nd Weight Sex Delivery Anes PTL Lv   3 Para            2 Para            1 Para                   The following portions of the patient's history were reviewed and updated as appropriate: allergies, current medications, past family history, past medical history, past social history, past surgical history and problem list     Review of Systems    Pertinent items are noted in HPI  Objective    Physical Exam   Constitutional: Vital signs are normal  She appears well-developed and well-nourished  Genitourinary: Vagina normal  Pelvic exam was performed with patient supine  There is no rash, tenderness, lesion or Bartholin's cyst on the right labia  There is no rash, tenderness, lesion or Bartholin's cyst on the left labia  Uterus is anteverted  Genitourinary Comments: Uterus, cervix and B/L ovaries surgically absent  Vaginal cuff well supported  Pap obtained of the vaginal cuff  HENT:   Head: Normocephalic and atraumatic  Neck: Neck supple   No thyromegaly present  Cardiovascular: Normal rate, regular rhythm, S1 normal, S2 normal and normal heart sounds  Pulmonary/Chest: Effort normal and breath sounds normal  Right breast exhibits no inverted nipple, no mass, no nipple discharge, no skin change and no tenderness  Left breast exhibits no inverted nipple, no mass, no nipple discharge, no skin change and no tenderness  Abdominal: Soft  Bowel sounds are normal  She exhibits no distension and no mass  There is no tenderness  There is no guarding  Lymphadenopathy:     She has no cervical adenopathy  She has no axillary adenopathy  Neurological: She is alert  Skin: Skin is warm  Psychiatric: She has a normal mood and affect  Nursing note and vitals reviewed  Assessment and Plan    Patient informed of a Stable GYN exam  A pap smear was performed  Mammogram script for 1 year given  I have discussed the importance of exercise and healthy diet as well as adequate intake of calcium and vitamin D  All questions have been answered to her satisfaction  Mammogram ordered  Follow up in: 1 year or sooner if needed

## 2018-10-31 LAB
LAB AP GYN PRIMARY INTERPRETATION: NORMAL
Lab: NORMAL

## 2018-11-18 ENCOUNTER — HOSPITAL ENCOUNTER (EMERGENCY)
Facility: HOSPITAL | Age: 63
Discharge: HOME | End: 2018-11-18
Attending: EMERGENCY MEDICINE
Payer: COMMERCIAL

## 2018-11-18 ENCOUNTER — APPOINTMENT (EMERGENCY)
Dept: CT IMAGING | Facility: HOSPITAL | Age: 63
End: 2018-11-18
Payer: COMMERCIAL

## 2018-11-18 VITALS
OXYGEN SATURATION: 97 % | BODY MASS INDEX: 23.88 KG/M2 | SYSTOLIC BLOOD PRESSURE: 146 MMHG | DIASTOLIC BLOOD PRESSURE: 87 MMHG | HEART RATE: 78 BPM | WEIGHT: 143.52 LBS | TEMPERATURE: 98 F | RESPIRATION RATE: 17 BRPM

## 2018-11-18 DIAGNOSIS — R10.9 ABDOMINAL PAIN: Primary | ICD-10-CM

## 2018-11-18 LAB
ALBUMIN SERPL BCP-MCNC: 3.7 G/DL (ref 3.5–5)
ALP SERPL-CCNC: 156 U/L (ref 46–116)
ALT SERPL W P-5'-P-CCNC: 30 U/L (ref 12–78)
ANION GAP SERPL CALCULATED.3IONS-SCNC: 8 MMOL/L (ref 4–13)
AST SERPL W P-5'-P-CCNC: 21 U/L (ref 5–45)
ATRIAL RATE: 81 BPM
BASOPHILS # BLD AUTO: 0.03 THOUSANDS/ΜL (ref 0–0.1)
BASOPHILS NFR BLD AUTO: 0 % (ref 0–1)
BILIRUB SERPL-MCNC: 0.5 MG/DL (ref 0.2–1)
BILIRUB UR QL STRIP: NEGATIVE
BUN SERPL-MCNC: 11 MG/DL (ref 5–25)
CALCIUM SERPL-MCNC: 9.6 MG/DL (ref 8.3–10.1)
CHLORIDE SERPL-SCNC: 104 MMOL/L (ref 100–108)
CLARITY UR: CLEAR
CO2 SERPL-SCNC: 28 MMOL/L (ref 21–32)
COLOR UR: YELLOW
CREAT SERPL-MCNC: 0.79 MG/DL (ref 0.6–1.3)
EOSINOPHIL # BLD AUTO: 0.07 THOUSAND/ΜL (ref 0–0.61)
EOSINOPHIL NFR BLD AUTO: 1 % (ref 0–6)
ERYTHROCYTE [DISTWIDTH] IN BLOOD BY AUTOMATED COUNT: 13.1 % (ref 11.6–15.1)
GFR SERPL CREATININE-BSD FRML MDRD: 81 ML/MIN/1.73SQ M
GLUCOSE SERPL-MCNC: 98 MG/DL (ref 65–140)
GLUCOSE UR STRIP-MCNC: NEGATIVE MG/DL
HCT VFR BLD AUTO: 40.5 % (ref 34.8–46.1)
HGB BLD-MCNC: 13.2 G/DL (ref 11.5–15.4)
HGB UR QL STRIP.AUTO: NEGATIVE
IMM GRANULOCYTES # BLD AUTO: 0.02 THOUSAND/UL (ref 0–0.2)
IMM GRANULOCYTES NFR BLD AUTO: 0 % (ref 0–2)
KETONES UR STRIP-MCNC: NEGATIVE MG/DL
LEUKOCYTE ESTERASE UR QL STRIP: NEGATIVE
LYMPHOCYTES # BLD AUTO: 2.19 THOUSANDS/ΜL (ref 0.6–4.47)
LYMPHOCYTES NFR BLD AUTO: 23 % (ref 14–44)
MCH RBC QN AUTO: 29.5 PG (ref 26.8–34.3)
MCHC RBC AUTO-ENTMCNC: 32.6 G/DL (ref 31.4–37.4)
MCV RBC AUTO: 91 FL (ref 82–98)
MONOCYTES # BLD AUTO: 0.86 THOUSAND/ΜL (ref 0.17–1.22)
MONOCYTES NFR BLD AUTO: 9 % (ref 4–12)
NEUTROPHILS # BLD AUTO: 6.52 THOUSANDS/ΜL (ref 1.85–7.62)
NEUTS SEG NFR BLD AUTO: 67 % (ref 43–75)
NITRITE UR QL STRIP: NEGATIVE
NRBC BLD AUTO-RTO: 0 /100 WBCS
P AXIS: 43 DEGREES
PH UR STRIP.AUTO: 6 [PH] (ref 4.5–8)
PLATELET # BLD AUTO: 385 THOUSANDS/UL (ref 149–390)
PMV BLD AUTO: 9.6 FL (ref 8.9–12.7)
POTASSIUM SERPL-SCNC: 4 MMOL/L (ref 3.5–5.3)
PR INTERVAL: 146 MS
PROT SERPL-MCNC: 8.6 G/DL (ref 6.4–8.2)
PROT UR STRIP-MCNC: NEGATIVE MG/DL
QRS AXIS: -4 DEGREES
QRSD INTERVAL: 84 MS
QT INTERVAL: 362 MS
QTC INTERVAL: 410 MS
RBC # BLD AUTO: 4.47 MILLION/UL (ref 3.81–5.12)
SODIUM SERPL-SCNC: 140 MMOL/L (ref 136–145)
SP GR UR STRIP.AUTO: <=1.005 (ref 1–1.03)
T WAVE AXIS: 32 DEGREES
TROPONIN I SERPL-MCNC: <0.02 NG/ML
UROBILINOGEN UR QL STRIP.AUTO: 0.2 E.U./DL
VENTRICULAR RATE: 77 BPM
WBC # BLD AUTO: 9.69 THOUSAND/UL (ref 4.31–10.16)

## 2018-11-18 PROCEDURE — 93010 ELECTROCARDIOGRAM REPORT: CPT | Performed by: INTERNAL MEDICINE

## 2018-11-18 PROCEDURE — 93005 ELECTROCARDIOGRAM TRACING: CPT

## 2018-11-18 PROCEDURE — 36415 COLL VENOUS BLD VENIPUNCTURE: CPT | Performed by: EMERGENCY MEDICINE

## 2018-11-18 PROCEDURE — 99284 EMERGENCY DEPT VISIT MOD MDM: CPT

## 2018-11-18 PROCEDURE — 85025 COMPLETE CBC W/AUTO DIFF WBC: CPT | Performed by: EMERGENCY MEDICINE

## 2018-11-18 PROCEDURE — 80053 COMPREHEN METABOLIC PANEL: CPT | Performed by: EMERGENCY MEDICINE

## 2018-11-18 PROCEDURE — 84484 ASSAY OF TROPONIN QUANT: CPT | Performed by: EMERGENCY MEDICINE

## 2018-11-18 PROCEDURE — 74177 CT ABD & PELVIS W/CONTRAST: CPT

## 2018-11-18 PROCEDURE — 81003 URINALYSIS AUTO W/O SCOPE: CPT | Performed by: EMERGENCY MEDICINE

## 2018-11-18 RX ORDER — MAGNESIUM HYDROXIDE/ALUMINUM HYDROXICE/SIMETHICONE 120; 1200; 1200 MG/30ML; MG/30ML; MG/30ML
30 SUSPENSION ORAL ONCE
Status: COMPLETED | OUTPATIENT
Start: 2018-11-18 | End: 2018-11-18

## 2018-11-18 RX ORDER — SUCRALFATE ORAL 1 G/10ML
1000 SUSPENSION ORAL ONCE
Status: COMPLETED | OUTPATIENT
Start: 2018-11-18 | End: 2018-11-18

## 2018-11-18 RX ORDER — OLANZAPINE 5 MG/1
10 TABLET, ORALLY DISINTEGRATING ORAL
Status: DISCONTINUED | OUTPATIENT
Start: 2018-11-18 | End: 2018-11-18

## 2018-11-18 RX ORDER — OLANZAPINE 5 MG/1
10 TABLET, ORALLY DISINTEGRATING ORAL ONCE
Status: COMPLETED | OUTPATIENT
Start: 2018-11-18 | End: 2018-11-18

## 2018-11-18 RX ADMIN — SUCRALFATE 1000 MG: 1 SUSPENSION ORAL at 07:11

## 2018-11-18 RX ADMIN — ALUMINUM HYDROXIDE, MAGNESIUM HYDROXIDE, AND SIMETHICONE 30 ML: 200; 200; 20 SUSPENSION ORAL at 07:10

## 2018-11-18 RX ADMIN — IOHEXOL 100 ML: 350 INJECTION, SOLUTION INTRAVENOUS at 08:22

## 2018-11-18 RX ADMIN — OLANZAPINE 10 MG: 5 TABLET, ORALLY DISINTEGRATING ORAL at 07:29

## 2018-11-18 NOTE — ED PROVIDER NOTES
History  Chief Complaint   Patient presents with    Abdominal Pain     Pt presents to ED for evaluation and treatment of upper abdominal pain  Pt with hx of chronic abdominal pain/gastritis     Patient is a pleasant 70-year-old female that reports to the emergency department with epigastric abdominal pain similar to her prior episodes of abdominal pain  No fevers, chills, sweats, nausea, vomiting  No rectal bleeding or rectal pain  No lightheadedness or dizziness  Abdominal pain is chronic but worse today  No exac or reliving factors  No chest pain or sob  Pain is achi, burning in natures  Also with some anxiety  No focal neuro defects  No pulsatile abdominal masses  MDM well appearing 65 yof, possible flare of chronic gastritis will rule out other acute itraabdominal pathology, MI, appendicitis, colitis  The patient (and any family present) verbalized understanding of the discharge instructions and warnings that would necessitate return to the Emergency Department  Gave verbal in addition to written discharge instructions  Specifically highlighted areas of special concern regarding the written and verbal discharge instructions and return precautions  All questions were answered prior to discharge  Prior to Admission Medications   Prescriptions Last Dose Informant Patient Reported?  Taking?   atorvastatin (LIPITOR) 10 mg tablet   No No   Sig: take 1 tablet by mouth once daily   hydrOXYzine HCL (ATARAX) 50 mg tablet   Yes No   Sig: Take 50 mg by mouth daily at bedtime   losartan (COZAAR) 100 MG tablet   Yes No   Sig: Take by mouth   omeprazole (PriLOSEC) 10 mg delayed release capsule   Yes No   Sig: Take 10 mg by mouth daily   sertraline (ZOLOFT) 50 mg tablet   Yes No   Sig: Take 50 mg by mouth daily      Facility-Administered Medications: None       Past Medical History:   Diagnosis Date    Acid reflux disease     Anemia     Anxiety     Cirrhosis ([facility] [address]     Depression     Fibromyalgia     Fibromyalgia     Gastritis     GERD (gastroesophageal reflux disease)     High blood pressure     Hyperlipidemia     Hypertension     Rheumatoid arthritis ([address] )     Urinary tract infection        Past Surgical History:   Procedure Laterality Date    BREAST BIOPSY Right     2011    CHOLECYSTECTOMY      COLONOSCOPY      Fiberoptic    HYSTERECTOMY      UPPER GASTROINTESTINAL ENDOSCOPY      therapeutic       Family History   Problem Relation Age of Onset    Heart attack Mother     Heart disease Mother     Hypertension Mother         High Blood Prssure    Hyperlipidemia Mother         High Cholesterol    Stroke Mother     Breast cancer Sister     Depression Sister     Other Sister         Thyroid Cyst    Diabetes Brother     Ovarian cancer Sister     Stroke Grandchild     Cholelithiasis Family     Depression Family     Gallbladder disease Family      I have reviewed and agree with the history as documented  Social History   Substance Use Topics    Smoking status: Never Smoker    Smokeless tobacco: Never Used      Comment: Denied Tobacco Use    Alcohol use No        Review of Systems   Gastrointestinal: Positive for abdominal pain  All other systems reviewed and are negative  Physical Exam  Physical Exam   Constitutional: She is oriented to person, place, and time  She appears well-developed and well-nourished  HENT:   Head: Normocephalic and atraumatic  Right Ear: External ear normal    Left Ear: External ear normal    Eyes: Conjunctivae and EOM are normal    Neck: Normal range of motion  Neck supple  No JVD present  No tracheal deviation present  Cardiovascular: Normal rate, regular rhythm and normal heart sounds  Pulmonary/Chest: Effort normal  No respiratory distress  She has no wheezes  She has no rales  Abdominal: Soft  Bowel sounds are normal  There is tenderness  There is no rebound and no guarding     Musculoskeletal: She exhibits no edema or tenderness  Neurological: She is alert and oriented to person, place, and time  Skin: Skin is warm and dry  No rash noted  No erythema  Psychiatric: She has a normal mood and affect  Thought content normal    Nursing note and vitals reviewed        Vital Signs  ED Triage Vitals   Temperature Pulse Respirations Blood Pressure SpO2   11/18/18 0538 11/18/18 0530 11/18/18 0530 11/18/18 0530 11/18/18 0530   98 °F (36 7 °C) 74 16 137/81 98 %      Temp Source Heart Rate Source Patient Position - Orthostatic VS BP Location FiO2 (%)   11/18/18 0538 11/18/18 0530 11/18/18 0530 11/18/18 0530 --   Oral Monitor Lying Right arm       Pain Score       --                  Vitals:    11/18/18 0530 11/18/18 0731   BP: 137/81 146/87   Pulse: 74 78   Patient Position - Orthostatic VS: Lying Lying       Visual Acuity      ED Medications  Medications   aluminum-magnesium hydroxide-simethicone (MYLANTA) 200-200-20 mg/5 mL oral suspension 30 mL (30 mL Oral Given 11/18/18 0710)   sucralfate (CARAFATE) oral suspension 1,000 mg (1,000 mg Oral Given 11/18/18 0711)   OLANZapine (ZyPREXA ZYDIS) dispersible tablet 10 mg (10 mg Oral Given 11/18/18 0729)   iohexol (OMNIPAQUE) 350 MG/ML injection (MULTI-DOSE) 100 mL (100 mL Intravenous Given 11/18/18 2400)       Diagnostic Studies  Results Reviewed     Procedure Component Value Units Date/Time    UA w Reflex to Microscopic w Reflex to Culture [[id number]] Collected:  11/18/18 0723    Lab Status:  Final result Specimen:  Urine from Urine, Clean Catch Updated:  11/18/18 0737     Color, UA Yellow     Clarity, UA Clear     Specific Gravity, UA <=1 005     pH, UA 6 0     Leukocytes, UA Negative     Nitrite, UA Negative     Protein, UA Negative mg/dl      Glucose, UA Negative mg/dl      Ketones, UA Negative mg/dl      Urobilinogen, UA 0 2 E U /dl      Bilirubin, UA Negative     Blood, UA Negative    Troponin I [[id number]]  (Normal) Collected:  11/18/18 0658    Lab Status:  Final result Specimen: Blood from Arm, Right Updated:  11/18/18 0731     Troponin I <0 02 ng/mL     Comprehensive metabolic panel [[id number]]  (Abnormal) Collected:  11/18/18 0658    Lab Status:  Final result Specimen:  Blood from Arm, Right Updated:  11/18/18 0730     Sodium 140 mmol/L      Potassium 4 0 mmol/L      Chloride 104 mmol/L      CO2 28 mmol/L      ANION GAP 8 mmol/L      BUN 11 mg/dL      Creatinine 0 79 mg/dL      Glucose 98 mg/dL      Calcium 9 6 mg/dL      AST 21 U/L      ALT 30 U/L      Alkaline Phosphatase 156 (H) U/L      Total Protein 8 6 (H) g/dL      Albumin 3 7 g/dL      Total Bilirubin 0 50 mg/dL      eGFR 81 ml/min/1 73sq m     Narrative:         National Kidney Disease Education Program recommendations are as follows:  GFR calculation is accurate only with a steady state creatinine  Chronic Kidney disease less than 60 ml/min/1 73 sq  meters  Kidney failure less than 15 ml/min/1 73 sq  meters  CBC and differential [[id number]] Collected:  11/18/18 0658    Lab Status:  Final result Specimen:  Blood from Arm, Right Updated:  11/18/18 0707     WBC 9 69 Thousand/uL      RBC 4 47 Million/uL      Hemoglobin 13 2 g/dL      Hematocrit 40 5 %      MCV 91 fL      MCH 29 5 pg      MCHC 32 6 g/dL      RDW 13 1 %      MPV 9 6 fL      Platelets 785 Thousands/uL      nRBC 0 /100 WBCs      Neutrophils Relative 67 %      Immat GRANS % 0 %      Lymphocytes Relative 23 %      Monocytes Relative 9 %      Eosinophils Relative 1 %      Basophils Relative 0 %      Neutrophils Absolute 6 52 Thousands/µL      Immature Grans Absolute 0 02 Thousand/uL      Lymphocytes Absolute 2 19 Thousands/µL      Monocytes Absolute 0 86 Thousand/µL      Eosinophils Absolute 0 07 Thousand/µL      Basophils Absolute 0 03 Thousands/µL                  CT abdomen pelvis with contrast   Final Result by Kadeem Leon MD (11/18 0830)         1  No acute abnormality the abdomen or pelvis     2   Diverticulosis with no acute diverticulitis, appendicitis, or bowel obstruction  Workstation performed: [id number]                    Procedures  Procedures       Phone Contacts  ED Phone Contact    ED Course                               MDM  CritCare Time    Disposition  Final diagnoses:   Abdominal pain     Time reflects when diagnosis was documented in both MDM as applicable and the Disposition within this note     Time User Action Codes Description Comment    11/18/2018  8:33 AM Torin Henley, [address] [R10 9] Abdominal pain       ED Disposition     ED Disposition Condition Comment    Discharge  Matilde Ramirez discharge to home/self care  Condition at discharge: Good        Follow-up Information     Follow up With Specialties Details Why Contact Info    Silvia Naranjo MD Family Medicine In 1 day  [address] [facility] [phone]            Discharge Medication List as of 11/18/2018  9:15 AM      CONTINUE these medications which have NOT CHANGED    Details   atorvastatin (LIPITOR) 10 mg tablet take 1 tablet by mouth once daily, Normal      hydrOXYzine HCL (ATARAX) 50 mg tablet Take 50 mg by mouth daily at bedtime, Starting Tue 10/9/2018, Historical Med      losartan (COZAAR) 100 MG tablet Take by mouth, Starting Sun 4/20/2014, Historical Med      omeprazole (PriLOSEC) 10 mg delayed release capsule Take 10 mg by mouth daily, Historical Med      sertraline (ZOLOFT) 50 mg tablet Take 50 mg by mouth daily, Historical Med           No discharge procedures on file      ED Provider  Electronically Signed by           Dee Wagner MD  11/18/18 4457

## 2018-11-18 NOTE — DISCHARGE INSTRUCTIONS
Dolor abdominal   CUIDADO AMBULATORIO:   Dolor abdominal  puede ser sordo, molesto o himanshu  Usted puede sentir dolor localizado en sarwat ese área del abdomen o en todo el abdomen  El dolor puede ser causado por sarwat afección cookie estreñimiento, sensibilidad o intoxicación alimentaria, infección o sarwat obstrucción  Asimismo, el dolor abdominal puede deberse a sarwat hernia, apendicitis o [place]  Las enfermedades del hígado, la vesícula o el riñón también pueden causar dolor abdominal  La causa del dolor abdominal puede ser desconocida  Busque atención médica de inmediato si:   · Usted comienza a sentir un dolor en el pecho o dificultad para respirar que antes no sentía  · Usted siente un dolor con pulsaciones en la parte superior del abdomen o en la parte inferior de la espalda que de repente se vuelve john  · El dolor se localiza en la parte inferior derecha del abdomen y [place] cuando se [place]  · Usted tiene fiebre por encima de los 100 4 °F (38 °C) o escalofríos  · Usted tiene vómitos y no puede retener líquidos ni alimentos en el estómago  · El dolor no mejora o empeora en las próximas 8 a 12 horas  · Usted nota sharda en bowers vómito o heces, o éstas tienen un aspecto negruzco y alquitranado  · Bowers piel o las partes serjio de clarita ojos se vuelven amarillentas  · Si usted es sarwat niurka y presenta abundante sangrado vaginal que no es bowers menstruación  Pregúntele a bowers Katt Guise vitaminas y minerales son adecuados para usted  · Usted siente dolor en la parte inferior de la espalda  · Usted es Raynell Spurr y tiene dolor en los testículos  · Siente dolor al [place]  · Usted tiene preguntas o inquietudes acerca de bowers condición o cuidado  El tratamiento para el dolor abdominal  puede llegar a incluir medicamentos para calmar bowers estómago, prevenir el vómito o disminuir el dolor  Acuda a clarita consultas de control con bowers médico según le indicaron    Anote clarita preguntas para que se acuerde de hacerlas camilla clarita visitas  © 2017 [address] [employer] Information is for End User's use only and may not be sold, redistributed or otherwise used for commercial purposes  All illustrations and images included in CareNotes® are the copyrighted property of A D A M , Inc  or Parth Galan  Esta información es sólo para uso en educación  Sandoval intención no es darle un consejo médico sobre enfermedades o tratamientos  Colsulte con sandoval Valorie Jewels farmacéutico antes de seguir cualquier régimen médico para saber si es seguro y efectivo para usted

## 2018-12-03 ENCOUNTER — OFFICE VISIT (OUTPATIENT)
Dept: FAMILY MEDICINE CLINIC | Facility: CLINIC | Age: 63
End: 2018-12-03
Payer: COMMERCIAL

## 2018-12-03 VITALS
BODY MASS INDEX: 24.98 KG/M2 | RESPIRATION RATE: 16 BRPM | WEIGHT: 141 LBS | HEIGHT: 63 IN | OXYGEN SATURATION: 98 % | SYSTOLIC BLOOD PRESSURE: 120 MMHG | TEMPERATURE: 98 F | HEART RATE: 70 BPM | DIASTOLIC BLOOD PRESSURE: 80 MMHG

## 2018-12-03 DIAGNOSIS — I10 ESSENTIAL HYPERTENSION: Primary | ICD-10-CM

## 2018-12-03 DIAGNOSIS — R79.89 LOW VITAMIN D LEVEL: ICD-10-CM

## 2018-12-03 PROBLEM — R92.8 ABNORMAL MAMMOGRAM: Status: ACTIVE | Noted: 2018-12-03

## 2018-12-03 PROCEDURE — 3008F BODY MASS INDEX DOCD: CPT | Performed by: NURSE PRACTITIONER

## 2018-12-03 PROCEDURE — 3079F DIAST BP 80-89 MM HG: CPT | Performed by: NURSE PRACTITIONER

## 2018-12-03 PROCEDURE — 3074F SYST BP LT 130 MM HG: CPT | Performed by: NURSE PRACTITIONER

## 2018-12-03 PROCEDURE — 99215 OFFICE O/P EST HI 40 MIN: CPT | Performed by: NURSE PRACTITIONER

## 2018-12-03 RX ORDER — ESOMEPRAZOLE MAGNESIUM 40 MG/1
CAPSULE, DELAYED RELEASE ORAL
Refills: 0 | COMMUNITY
Start: 2018-11-13

## 2018-12-03 RX ORDER — PHENOL 1.4 %
600 AEROSOL, SPRAY (ML) MUCOUS MEMBRANE 2 TIMES DAILY WITH MEALS
Qty: 90 TABLET | Refills: 3 | Status: SHIPPED | OUTPATIENT
Start: 2018-12-03 | End: 2019-06-19 | Stop reason: SDUPTHER

## 2018-12-03 RX ORDER — LOSARTAN POTASSIUM 100 MG/1
100 TABLET ORAL DAILY
Qty: 90 TABLET | Refills: 1 | Status: SHIPPED | OUTPATIENT
Start: 2018-12-03 | End: 2020-01-08 | Stop reason: ALTCHOICE

## 2018-12-03 RX ORDER — MELATONIN
1000 DAILY
Qty: 90 TABLET | Refills: 3 | Status: SHIPPED | OUTPATIENT
Start: 2018-12-03 | End: 2019-12-10 | Stop reason: SDUPTHER

## 2018-12-03 RX ORDER — RISPERIDONE 0.5 MG/1
0.5 TABLET, FILM COATED ORAL
Refills: 0 | COMMUNITY
Start: 2018-11-25

## 2018-12-03 RX ORDER — TRAZODONE HYDROCHLORIDE 50 MG/1
50 TABLET ORAL
Refills: 0 | COMMUNITY
Start: 2018-11-25

## 2018-12-03 NOTE — PROGRESS NOTES
Assessment/Plan:  1  Essential hypertension  stable  - losartan (COZAAR) 100 MG tablet; Take 1 tablet (100 mg total) by mouth daily  Dispense: 90 tablet; Refill: 1  - Comprehensive metabolic panel; Future  - Lipid panel; Future  - TSH, 3rd generation with Free T4 reflex; Future  - Vitamin D 25 hydroxy; Future    3  Low vitamin D level  Last vit d in 2017 32  - Vitamin D 25 hydroxy; Future  - calcium carbonate (OS-MOLLY) 600 MG tablet; Take 1 tablet (600 mg total) by mouth 2 (two) times a day with meals  Dispense: 90 tablet; Refill: 3  - cholecalciferol (VITAMIN D3) 1,000 units tablet; Take 1 tablet (1,000 Units total) by mouth daily  Dispense: 90 tablet; Refill: 3    I have spent 45  minutes with Patient and family today in which greater than 50% of this time was spent in counseling/coordination of care regarding Diagnostic results, Intructions for management, Patient and family education and Impressions  Subjective:   Chief Complaint   Patient presents with   Yanet Kang [facility]    Patient complain of a sore throat since Friday      Patient ID: Kendra Manzano is a 65 y o  female  HPI  New pt - w/ multiple co morbidities including Sjogren's syndrome, Salazar's esophagus with gastritis, gastroparesis, biliary cirrhosis, dysphagia, vit d def, depression and anxiety and hypertension  Pt under the care of  Jennifer Nation [facility]  Also has appointment w/ GYN  I will be follow ing HTN/ low vit D  Chronic complaints - dry mouth, dry throat and on going abd pain - that waxes and wanes in intensity - unrelated to food  Stable ct of abd noted 11/18/18   EGD 2013 - showed gastritis  Med list verified  Recent labs reviewed  The following portions of the patient's history were reviewed and updated as appropriate: allergies, past social history, past surgical history and problem list     Review of Systems   Constitutional: Negative for activity change and appetite change     HENT: Negative  Dry mouth   Eyes: Negative  Respiratory: Negative  Cardiovascular: Negative  Negative for chest pain  Gastrointestinal: Positive for abdominal pain  Endocrine: Negative  Negative for cold intolerance  Genitourinary: Negative  Musculoskeletal: Negative  Skin: Negative  Allergic/Immunologic: Negative  Neurological: Negative  Hematological: Negative  Psychiatric/Behavioral: Negative  MDD - stable   All other systems reviewed and are negative  Objective:      /80 (BP Location: Left arm, Patient Position: Sitting, Cuff Size: Adult)   Pulse 70   Temp 98 °F (36 7 °C) (Oral)   Resp 16   Ht 5' 3" (1 6 m)   Wt 64 kg (141 lb)   SpO2 98%   BMI 24 98 kg/m²          Physical Exam   Constitutional: She is oriented to person, place, and time  She appears well-developed and well-nourished  No distress  HENT:   Head: Normocephalic  Right Ear: Tympanic membrane and external ear normal  No decreased hearing is noted  Left Ear: Tympanic membrane and external ear normal  No decreased hearing is noted  Mouth/Throat: Oropharynx is clear and moist    Eyes: Pupils are equal, round, and reactive to light  Conjunctivae are normal    Neck: Normal range of motion  Neck supple  No thyromegaly present  Cardiovascular: Normal rate, regular rhythm, normal heart sounds and intact distal pulses  No murmur heard  Pulmonary/Chest: Effort normal and breath sounds normal  No respiratory distress  Abdominal: Soft  Bowel sounds are normal  She exhibits no distension and no mass  There is tenderness (epigastric and RT lower quandrant)  There is no rebound and no guarding  Musculoskeletal: Normal range of motion  Lymphadenopathy:     She has no cervical adenopathy  Neurological: She is alert and oriented to person, place, and time  She has normal reflexes  No cranial nerve deficit  Coordination normal    Skin: Skin is warm and dry     Psychiatric: She has a normal mood and affect   Her behavior is normal  Judgment and thought content normal

## 2018-12-16 ENCOUNTER — HOSPITAL ENCOUNTER (EMERGENCY)
Facility: HOSPITAL | Age: 63
Discharge: HOME | End: 2018-12-16
Attending: EMERGENCY MEDICINE | Admitting: EMERGENCY MEDICINE
Payer: COMMERCIAL

## 2018-12-16 VITALS
OXYGEN SATURATION: 96 % | RESPIRATION RATE: 16 BRPM | SYSTOLIC BLOOD PRESSURE: 123 MMHG | WEIGHT: 141.76 LBS | HEART RATE: 74 BPM | BODY MASS INDEX: 25.11 KG/M2 | TEMPERATURE: 98 F | DIASTOLIC BLOOD PRESSURE: 79 MMHG

## 2018-12-16 DIAGNOSIS — N39.0 UTI (URINARY TRACT INFECTION): Primary | ICD-10-CM

## 2018-12-16 LAB
BACTERIA UR QL AUTO: ABNORMAL /HPF
BILIRUB UR QL STRIP: NEGATIVE
CLARITY UR: CLEAR
COLOR UR: YELLOW
GLUCOSE UR STRIP-MCNC: NEGATIVE MG/DL
HGB UR QL STRIP.AUTO: ABNORMAL
KETONES UR STRIP-MCNC: NEGATIVE MG/DL
LEUKOCYTE ESTERASE UR QL STRIP: ABNORMAL
MUCOUS THREADS UR QL AUTO: ABNORMAL
NITRITE UR QL STRIP: NEGATIVE
NON-SQ EPI CELLS URNS QL MICRO: ABNORMAL /HPF
PH UR STRIP.AUTO: 6 [PH] (ref 4.5–8)
PROT UR STRIP-MCNC: NEGATIVE MG/DL
RBC #/AREA URNS AUTO: ABNORMAL /HPF
SP GR UR STRIP.AUTO: 1.01 (ref 1–1.03)
UROBILINOGEN UR QL STRIP.AUTO: 0.2 E.U./DL
WBC #/AREA URNS AUTO: ABNORMAL /HPF

## 2018-12-16 PROCEDURE — 81001 URINALYSIS AUTO W/SCOPE: CPT | Performed by: EMERGENCY MEDICINE

## 2018-12-16 PROCEDURE — 87186 SC STD MICRODIL/AGAR DIL: CPT | Performed by: EMERGENCY MEDICINE

## 2018-12-16 PROCEDURE — 99283 EMERGENCY DEPT VISIT LOW MDM: CPT

## 2018-12-16 PROCEDURE — 87086 URINE CULTURE/COLONY COUNT: CPT | Performed by: EMERGENCY MEDICINE

## 2018-12-16 PROCEDURE — 87077 CULTURE AEROBIC IDENTIFY: CPT | Performed by: EMERGENCY MEDICINE

## 2018-12-16 RX ORDER — SULFAMETHOXAZOLE AND TRIMETHOPRIM 800; 160 MG/1; MG/1
1 TABLET ORAL ONCE
Status: COMPLETED | OUTPATIENT
Start: 2018-12-16 | End: 2018-12-16

## 2018-12-16 RX ORDER — PHENAZOPYRIDINE HYDROCHLORIDE 100 MG/1
200 TABLET, FILM COATED ORAL ONCE
Status: COMPLETED | OUTPATIENT
Start: 2018-12-16 | End: 2018-12-16

## 2018-12-16 RX ORDER — SULFAMETHOXAZOLE AND TRIMETHOPRIM 800; 160 MG/1; MG/1
1 TABLET ORAL 2 TIMES DAILY
Qty: 14 TABLET | Refills: 0 | Status: SHIPPED | OUTPATIENT
Start: 2018-12-16 | End: 2018-12-23

## 2018-12-16 RX ORDER — PHENAZOPYRIDINE HYDROCHLORIDE 200 MG/1
200 TABLET, FILM COATED ORAL 3 TIMES DAILY PRN
Qty: 6 TABLET | Refills: 0 | Status: SHIPPED | OUTPATIENT
Start: 2018-12-16 | End: 2018-12-18

## 2018-12-16 RX ADMIN — PHENAZOPYRIDINE HYDROCHLORIDE 200 MG: 100 TABLET ORAL at 02:42

## 2018-12-16 RX ADMIN — SULFAMETHOXAZOLE AND TRIMETHOPRIM 1 TABLET: 800; 160 TABLET ORAL at 02:53

## 2018-12-16 NOTE — DISCHARGE INSTRUCTIONS
Infección del tracto urinario en mujeres   LO QUE NECESITA SABER:   Sarwat infección en el tracto urinario (ITU) ocurre cuando entran bacterias en bowers tracto urinario  La mayoría de las bacterias que entran al tracto urinario salen al Rubbie Sauger  Si la bacteria permanece en el tracto urinario, usted podría contraer Pitney Melchor  Bowers tracto urinario incluye clarita riñones, uréteres, vejiga y Gondregnies  La orina es producida en los riñones y fluye del uréter a la vejiga  La orina sale de la vejiga a través de la uretra  Sarwat infección del tracto urinario es más común in [place] parte inferior de bowers tracto urinario que incluye bowers vejiga y uretra  INSTRUCCIONES SOBRE EL JOSE ALEJANDRO HOSPITALARIA:   Regrese a la zion de emergencias si:   · Usted está orinando muy poco o nada en absoluto  · Usted tiene fiebre jose alejandro con temblor y escalofríos  · Usted tiene dolor en el costado o en la espalda que EVER  Pregúntele a bowers Rogue Beady vitaminas y minerales son adecuados para usted  · Usted tiene fiebre  · Usted no siente mejoría después de 2 días de chandana los antibióticos  · Usted esta vomitando  · Usted tiene preguntas o inquietudes acerca de bowers condición o cuidado  Medicamentos:   · Antibióticos  ayudan a combatir sarwat infección bacterial      · Medicamentos,  para disminuir el dolor y el ardor al orinar  También ayudarán a disminuir la sensación de necesitar orinar con frecuencia  Estos medicamentos harán que orine de color anaranjado o ospina  · [place] clarita medicamentos cookie se le haya indicado  Consulte con bowers médico si usted rufus que bowers medicamento no le está ayudando o si presenta efectos secundarios  Infórmele si es alérgico a cualquier medicamento  Mantenga sarwat lista actualizada de los [place], las vitaminas y los productos herbales que jeanie  Incluya los siguientes datos de los medicamentos: cantidad, frecuencia y motivo de administración   Traiga con usted la lista o los envases de la píldoras a clarita citas de seguimiento  Lleve la lista de los medicamentos con usted en alejandro de sarwat emergencia  Acuda a clarita consultas de control con bowers médico según le indicaron  Anote clarita preguntas para que se acuerde de hacerlas camilla clarita visitas  Evite otra ITU:   · Vacíe la vejiga con frecuencia  Orine y vacíe la vejiga tan pronto cookie usted sienta la necesidad  No retenga la orina por largos períodos de [place]  · Límpiese de adelante hacia atrás después de orinar o de tener sarwat evacuación intestinal   [place] ayudará a evitar que los gérmenes entren en el tracto urinario a través de la uretra  · [address] se le haya indicado  Pregunte cuánto líquido debe chandana cada día y cuáles líquidos son los más adecuados para usted  Es probable que usted necesite chandana más líquidos de lo habitual para ayudar a deshacerse de la bacteria  No tome alcohol, cafeína ni jugos cítricos  Estos pueden irritar bowers vejiga y aumentar clarita síntomas  Bowers médico puede recomendarle el jugo de arándano para prevenir sarwat infección Susan Copping  · Orine después de [employer]  [place] puede ayudar a eliminar las bacterias que pasan camilla el sexo  · No tome duchas vaginales ni use desodorantes femeninos  Estos pueden cambiar el equilibrio químico de la vagina  · [place] las compresas o los tampones a menudo  [place] ayudará a evitar que los gérmenes entren en el tracto urinario  · Realice ejercicios para el piso pélvico con frecuencia  Los músculos pélvicos ayudan a empezar y parar de orinar  Los músculos pélvicos keon ayudan a vaciar la vejiga más fácilmente  Apriete estos músculos firmemente camilla 5 segundos cookie si estuviera tratando de retener el flujo de [facility]  Luego relaje por 5 segundos  Aumente gradualmente a 10 segundos  Anne 3 series de 15 repeticiones al día o cookie se le indique    © 2017 [address] Information is for End User's use only and may not be sold, redistributed or otherwise used for commercial purposes  All illustrations and images included in CareNotes® are the copyrighted property of A DEE DEE A M , Inc  or Parth Galan  Esta información es sólo para uso en educación  Bowers intención no es darle un consejo médico sobre enfermedades o tratamientos  Colsulte con bowers Marielle Antis farmacéutico antes de seguir cualquier régimen médico para saber si es seguro y efectivo para usted  Infección en el tracto urinario en adultos mayores   LO QUE NECESITA SABER:   Patti infección en el tracto urinario (ITU) ocurre cuando entran bacterias en bowers tracto urinario  Bowers tracto urinario incluye clarita riñones, uréteres, vejiga y Gondregnies  La orina es producida en los riñones y fluye del uréter a la vejiga  La orina sale de la vejiga a través de la uretra  Patti infección del tracto urinario es más común in [place] parte inferior de bowers tracto urinario que incluye bowers vejiga y uretra  INSTRUCCIONES SOBRE EL JOSE ALEJANDRO HOSPITALARIA:   Regrese a la zion de emergencias si:   · Usted está orinando muy poco o nada en absoluto  · Usted esta vomitando  · Usted tiene fiebre jose alejandro con temblor y escalofríos  · Usted tiene dolor en el costado o en la espalda que Karissa Alysa  Pregúntele a bowers [place] vitaminas y minerales son adecuados para usted  · Usted tiene fiebre  · Es [place], y el flujo vaginal coppola o amarillo ha aumentado  · Usted no siente mejoría después de 2 días de chandana los antibióticos  · Usted tiene preguntas o inquietudes acerca de bowers condición o cuidado  Medicamentos:   · Medicamentos,  ayudan a tratar la infección bacterial o disminuir el dolor y la quemazón cuando usted [place]  Es probable que usted también necesite medicamentos para disminuir la urgencia de orinar con frecuencia  Bowers médico puede recomendarle jugo de arándano o suplementos de arándanos para [employer]  · [place] clarita medicamentos cookie se le haya indicado    Consulte con bowers médico si usted rufus que bowers medicamento no le está ayudando o si presenta efectos secundarios  Infórmele si es alérgico a cualquier medicamento  Mantenga sarwat lista actualizada de los [place], las vitaminas y los productos herbales que jeanie  Incluya los siguientes datos de los medicamentos: cantidad, frecuencia y motivo de administración  Traiga con usted la lista o los envases de la píldoras a clarita citas de seguimiento  Lleve la lista de los medicamentos con usted en alejandro de sarwat emergencia  Cuidados personales:   · Orine cuando sienta el impulso de hacerlo  No retenga la orina porque pueden desarrollarse bacterias en la vejiga si la orina permanece demasiado tiempo en la vejiga  Puede ser Glee Ania orinar al menos cada 3 o 4 horas  · [address] se le haya indicado  Los líquidos pueden ayudar a eliminar las bacterias del tracto urinario  Pregunte cuánto líquido debe chandana cada día y cuáles líquidos son los más adecuados para usted  Es probable que usted necesite chandana más líquidos de lo habitual para ayudar a deshacerse de la bacteria  No tome alcohol, cafeína ni jugos cítricos  Estos pueden irritar bowers vejiga y aumentar clarita síntomas  · La aplicación de calor  sobre el abdomen de 20 a 30 minutos cada 2 horas por los [employer] indiquen  El calor ayuda a disminuir las molestias y la presión en bowers vejiga  Evite sarwat ITU:   · Las mujeres deberían limpiarse de adelante hacia atrás  después de orinar o de realizar sarwat evacuación intestinal  Ulysses podría evitar que los gérmenes entren en el tracto urinario  · Orine después de [employer] sexuales  para eliminar las bacterias que pudieran ingresar en el tracto urinario camilla las relaciones sexuales  · Use ropa interior de algodón y ropa suelta  Los pantalones ajustados y la ropa interior de nylon pueden atrapar humedad y hacer que las bacterias crezcan  Acuda a clarita consultas de control con bowers médico según le indicaron    Anote clarita preguntas para que se acuerde de hacerlas camilla clarita visitas  © 2017 [address] Vogel Information is for End User's use only and may not be sold, redistributed or otherwise used for commercial purposes  All illustrations and images included in CareNotes® are the copyrighted property of A D A M , Inc  or Parth Galan  Esta información es sólo para uso en educación  [place] intención no es darle un consejo médico sobre enfermedades o tratamientos  Colsulte con sandoval Crystal Adler farmacéutico antes de seguir cualquier régimen médico para saber si es seguro y efectivo para usted

## 2018-12-16 NOTE — ED PROVIDER NOTES
History  Chief Complaint   Patient presents with    Possible UTI     Pt  with pain and burning with urination for past week, has tried cranberry juice but isn't helping  Patient is a 59year old female with dysuria and frequency since this past Thursday  No fever  No hematuria  No N/V  Was last seen in this ED on 11/18/18 for abdominal pain  [facility] website checked on this patient and no Rx found  Gets frequent UTIs as per daughter  Last urine cx from 8/2018 showed e  Coli susceptible to all abx except ampicillin  History provided by:  Patient and relative (daughter)      Prior to Admission Medications   Prescriptions Last Dose Informant Patient Reported?  Taking?   atorvastatin (LIPITOR) 10 mg tablet  Self No No   Sig: take 1 tablet by mouth once daily   calcium carbonate (OS-MOLLY) 600 MG tablet   No No   Sig: Take 1 tablet (600 mg total) by mouth 2 (two) times a day with meals   cholecalciferol (VITAMIN D3) 1,000 units tablet   No No   Sig: Take 1 tablet (1,000 Units total) by mouth daily   esomeprazole (NexIUM) 40 MG capsule  Self Yes No   hydrOXYzine HCL (ATARAX) 50 mg tablet  Self Yes No   Sig: Take 50 mg by mouth daily at bedtime   losartan (COZAAR) 100 MG tablet   No No   Sig: Take 1 tablet (100 mg total) by mouth daily   risperiDONE (RisperDAL) 0 5 mg tablet  Self Yes No   Sig: Take 0 5 mg by mouth daily at bedtime   sertraline (ZOLOFT) 50 mg tablet  Self Yes No   Sig: Take 50 mg by mouth daily   sertraline (ZOLOFT) 50 mg tablet  Self Yes No   Sig: every 24 hours   traZODone (DESYREL) 50 mg tablet  Self Yes No   Sig: Take 50 mg by mouth daily at bedtime      Facility-Administered Medications: None       Past Medical History:   Diagnosis Date    Acid reflux disease     Anemia     Anxiety     Cirrhosis (HCC)     Depression     Fibromyalgia     Fibromyalgia     Gastritis     GERD (gastroesophageal reflux disease)     High blood pressure     Hyperlipidemia     Hypertension     Rheumatoid arthritis ([facility] [address] )     Urinary tract infection        Past Surgical History:   Procedure Laterality Date    BREAST BIOPSY Right     2011    CHOLECYSTECTOMY      COLONOSCOPY      Fiberoptic    HYSTERECTOMY      UPPER GASTROINTESTINAL ENDOSCOPY      therapeutic       Family History   Problem Relation Age of Onset    Heart attack Mother     Heart disease Mother    [place] Hypertension Mother         High Blood Prssure    Hyperlipidemia Mother         High Cholesterol    Stroke Mother     Breast cancer Sister     Depression Sister     Other Sister         Thyroid Cyst    Diabetes Brother     Ovarian cancer Sister     Stroke Grandchild     Cholelithiasis Family     Depression Family     Gallbladder disease Family      I have reviewed and agree with the history as documented  Social History   Substance Use Topics    Smoking status: Never Smoker    Smokeless tobacco: Never Used      Comment: Denied Tobacco Use    Alcohol use No        Review of Systems   Constitutional: Negative for fever  Gastrointestinal: Negative for nausea and vomiting  Genitourinary: Positive for dysuria and frequency  Negative for hematuria  Physical Exam  Physical Exam   Constitutional: She appears well-developed and well-nourished  She appears distressed (mild)  HENT:   Head: Normocephalic and atraumatic  Mouth/Throat: Oropharynx is clear and moist    Eyes: No scleral icterus  Neck: No tracheal deviation present  Cardiovascular: Normal rate, regular rhythm and normal heart sounds  No murmur heard  Pulmonary/Chest: Effort normal and breath sounds normal  No stridor  No respiratory distress  Abdominal: Soft  Bowel sounds are normal  She exhibits no distension  There is no tenderness  No CVAT  Neurological: She is alert  Skin: Skin is warm and dry  No rash noted  Nursing note and vitals reviewed        Vital Signs  ED Triage Vitals [12/16/18 0152]   Temperature Pulse Respirations Blood Pressure SpO2   98 °F (36 7 °C) 78 16 150/77 98 %      Temp Source Heart Rate Source Patient Position - Orthostatic VS BP Location FiO2 (%)   Oral Monitor Sitting Right arm --      Pain Score       8           Vitals:    12/16/18 0152   BP: 150/77   Pulse: 78   Patient Position - Orthostatic VS: Sitting       Visual Acuity      ED Medications  Medications   sulfamethoxazole-trimethoprim (BACTRIM DS) 800-160 mg per tablet 1 tablet (not administered)   phenazopyridine (PYRIDIUM) tablet 200 mg (200 mg Oral Given 12/16/18 0242)       Diagnostic Studies  Results Reviewed     Procedure Component Value Units Date/Time    Urine Microscopic [[id number]]  (Abnormal) Collected:  12/16/18 0221    Lab Status:  Final result Specimen:  Urine from Urine, Clean Catch Updated:  12/16/18 0240     RBC, UA 1-2 (A) /hpf      WBC, UA 30-50 (A) /hpf      Epithelial Cells Occasional /hpf      Bacteria, UA Occasional /hpf      MUCUS THREADS Occasional (A)    UA w Reflex to Microscopic [[id number]]  (Abnormal) Collected:  12/16/18 0221    Lab Status:  Final result Specimen:  Urine from Urine, Clean Catch Updated:  12/16/18 0229     Color, UA Yellow     Clarity, UA Clear     Specific Gravity, UA 1 010     pH, UA 6 0     Leukocytes, UA Small (A)     Nitrite, UA Negative     Protein, UA Negative mg/dl      Glucose, UA Negative mg/dl      Ketones, UA Negative mg/dl      Urobilinogen, UA 0 2 E U /dl      Bilirubin, UA Negative     Blood, UA Small (A)    Urine culture [[id number]] Collected:  12/16/18 0221    Lab Status: In process Specimen:  Urine from Urine, Clean Catch Updated:  12/16/18 0225                 No orders to display              Procedures  Procedures       Phone Contacts  ED Phone Contact    ED Course  ED Course as of Dec 16 0249   Sun Dec 16, 2018   0245 UA d/w patient and daughter                                   MDM  Number of Diagnoses or Management Options  Diagnosis management comments: DDx including but not limited to: UTI, interstitial cystitis; doubt pyelonephritis, vaginitis; doubt acute surgical intraabdominal process or renal colic  Amount and/or Complexity of Data Reviewed  Clinical lab tests: ordered and reviewed  Decide to obtain previous medical records or to obtain history from someone other than the patient: yes  Obtain history from someone other than the patient: yes  Review and summarize past medical records: yes      CritCare Time    Disposition  Final diagnoses:   UTI (urinary tract infection)     Time reflects when diagnosis was documented in both MDM as applicable and the Disposition within this note     Time User Action Codes Description Comment    12/16/2018  2:48 AM Trenton Rubi Add [N39 0] UTI (urinary tract infection)       ED Disposition     ED Disposition Condition Comment    Discharge  Brent Dunbar discharge to home/self care  Condition at discharge: Stable        Follow-up Information     Follow up With Specialties Details Why Contact Info    Ying Vieyra MD Family Medicine Call in 2 days Return sooner if increased pain, fever, vomiting, rash, bleeding  [facility]            Patient's Medications   Discharge Prescriptions    PHENAZOPYRIDINE (PYRIDIUM) 200 MG TABLET    Take 1 tablet (200 mg total) by mouth 3 (three) times a day as needed for bladder spasms for up to 2 days Will turn urine orange  Start Date: 12/16/2018End Date: 12/18/2018       Order Dose: 200 mg       Quantity: 6 tablet    Refills: 0    SULFAMETHOXAZOLE-TRIMETHOPRIM (BACTRIM DS) 800-160 MG PER TABLET    Take 1 tablet by mouth 2 (two) times a day for 7 days       Start Date: 12/16/2018End Date: 12/23/2018       Order Dose: 1 tablet       Quantity: 14 tablet    Refills: 0     No discharge procedures on file      ED Provider  Electronically Signed by           Sacha Flowers MD  12/16/18 9672

## 2018-12-19 LAB — BACTERIA UR CULT: ABNORMAL

## 2019-05-20 ENCOUNTER — TRANSCRIBE ORDERS (OUTPATIENT)
Dept: LAB | Facility: HOSPITAL | Age: 64
End: 2019-05-20

## 2019-05-20 ENCOUNTER — APPOINTMENT (OUTPATIENT)
Dept: LAB | Facility: HOSPITAL | Age: 64
End: 2019-05-20
Payer: COMMERCIAL

## 2019-05-20 DIAGNOSIS — I10 ESSENTIAL HYPERTENSION: ICD-10-CM

## 2019-05-20 DIAGNOSIS — M35.01 KERATOCONJUNCTIVITIS SICCA (HCC): ICD-10-CM

## 2019-05-20 DIAGNOSIS — K74.5 BILIARY CIRRHOSIS (HCC): Primary | ICD-10-CM

## 2019-05-20 DIAGNOSIS — M79.7 SCAPULOHUMERAL FIBROSITIS: ICD-10-CM

## 2019-05-20 DIAGNOSIS — R79.89 LOW VITAMIN D LEVEL: ICD-10-CM

## 2019-05-20 DIAGNOSIS — K74.5 BILIARY CIRRHOSIS (HCC): ICD-10-CM

## 2019-05-20 LAB
25(OH)D3 SERPL-MCNC: 21.1 NG/ML (ref 30–100)
ALBUMIN SERPL BCP-MCNC: 3.4 G/DL (ref 3.5–5)
ALP SERPL-CCNC: 125 U/L (ref 46–116)
ALT SERPL W P-5'-P-CCNC: 22 U/L (ref 12–78)
ANION GAP SERPL CALCULATED.3IONS-SCNC: 6 MMOL/L (ref 4–13)
AST SERPL W P-5'-P-CCNC: 16 U/L (ref 5–45)
BASOPHILS # BLD AUTO: 0.04 THOUSANDS/ΜL (ref 0–0.1)
BASOPHILS NFR BLD AUTO: 1 % (ref 0–1)
BILIRUB SERPL-MCNC: 0.36 MG/DL (ref 0.2–1)
BUN SERPL-MCNC: 12 MG/DL (ref 5–25)
C3 SERPL-MCNC: 112 MG/DL (ref 90–180)
C4 SERPL-MCNC: 27 MG/DL (ref 10–40)
CALCIUM SERPL-MCNC: 8.5 MG/DL (ref 8.3–10.1)
CHLORIDE SERPL-SCNC: 109 MMOL/L (ref 100–108)
CHOLEST SERPL-MCNC: 236 MG/DL (ref 50–200)
CO2 SERPL-SCNC: 27 MMOL/L (ref 21–32)
CREAT SERPL-MCNC: 0.74 MG/DL (ref 0.6–1.3)
CRP SERPL QL: 3.4 MG/L
EOSINOPHIL # BLD AUTO: 0.12 THOUSAND/ΜL (ref 0–0.61)
EOSINOPHIL NFR BLD AUTO: 2 % (ref 0–6)
ERYTHROCYTE [DISTWIDTH] IN BLOOD BY AUTOMATED COUNT: 13.4 % (ref 11.6–15.1)
GFR SERPL CREATININE-BSD FRML MDRD: 87 ML/MIN/1.73SQ M
GLUCOSE P FAST SERPL-MCNC: 88 MG/DL (ref 65–99)
HCT VFR BLD AUTO: 37.8 % (ref 34.8–46.1)
HDLC SERPL-MCNC: 34 MG/DL (ref 40–60)
HGB BLD-MCNC: 12 G/DL (ref 11.5–15.4)
IMM GRANULOCYTES # BLD AUTO: 0.01 THOUSAND/UL (ref 0–0.2)
IMM GRANULOCYTES NFR BLD AUTO: 0 % (ref 0–2)
LDLC SERPL CALC-MCNC: 177 MG/DL (ref 0–100)
LYMPHOCYTES # BLD AUTO: 2.21 THOUSANDS/ΜL (ref 0.6–4.47)
LYMPHOCYTES NFR BLD AUTO: 35 % (ref 14–44)
MCH RBC QN AUTO: 29.6 PG (ref 26.8–34.3)
MCHC RBC AUTO-ENTMCNC: 31.7 G/DL (ref 31.4–37.4)
MCV RBC AUTO: 93 FL (ref 82–98)
MONOCYTES # BLD AUTO: 0.76 THOUSAND/ΜL (ref 0.17–1.22)
MONOCYTES NFR BLD AUTO: 12 % (ref 4–12)
NEUTROPHILS # BLD AUTO: 3.16 THOUSANDS/ΜL (ref 1.85–7.62)
NEUTS SEG NFR BLD AUTO: 50 % (ref 43–75)
NONHDLC SERPL-MCNC: 202 MG/DL
NRBC BLD AUTO-RTO: 0 /100 WBCS
PLATELET # BLD AUTO: 338 THOUSANDS/UL (ref 149–390)
PMV BLD AUTO: 10.3 FL (ref 8.9–12.7)
POTASSIUM SERPL-SCNC: 3.9 MMOL/L (ref 3.5–5.3)
PROT SERPL-MCNC: 8 G/DL (ref 6.4–8.2)
RBC # BLD AUTO: 4.06 MILLION/UL (ref 3.81–5.12)
SODIUM SERPL-SCNC: 142 MMOL/L (ref 136–145)
TRIGL SERPL-MCNC: 126 MG/DL
TSH SERPL DL<=0.05 MIU/L-ACNC: 1.53 UIU/ML (ref 0.36–3.74)
WBC # BLD AUTO: 6.3 THOUSAND/UL (ref 4.31–10.16)

## 2019-05-20 PROCEDURE — 36415 COLL VENOUS BLD VENIPUNCTURE: CPT

## 2019-05-20 PROCEDURE — 86140 C-REACTIVE PROTEIN: CPT

## 2019-05-20 PROCEDURE — 80061 LIPID PANEL: CPT

## 2019-05-20 PROCEDURE — 84443 ASSAY THYROID STIM HORMONE: CPT

## 2019-05-20 PROCEDURE — 86038 ANTINUCLEAR ANTIBODIES: CPT

## 2019-05-20 PROCEDURE — 86160 COMPLEMENT ANTIGEN: CPT

## 2019-05-20 PROCEDURE — 82306 VITAMIN D 25 HYDROXY: CPT

## 2019-05-20 PROCEDURE — 80053 COMPREHEN METABOLIC PANEL: CPT

## 2019-05-20 PROCEDURE — 86235 NUCLEAR ANTIGEN ANTIBODY: CPT

## 2019-05-20 PROCEDURE — 85025 COMPLETE CBC W/AUTO DIFF WBC: CPT

## 2019-05-21 LAB
ENA SS-A AB SER-ACNC: 4.5 AI (ref 0–0.9)
ENA SS-B AB SER-ACNC: <0.2 AI (ref 0–0.9)

## 2019-05-23 LAB — RYE IGE QN: NEGATIVE

## 2019-06-19 DIAGNOSIS — R79.89 LOW VITAMIN D LEVEL: ICD-10-CM

## 2019-06-20 RX ORDER — PHENOL 1.4 %
600 AEROSOL, SPRAY (ML) MUCOUS MEMBRANE DAILY
Qty: 90 TABLET | Refills: 3 | Status: SHIPPED | OUTPATIENT
Start: 2019-06-20 | End: 2020-02-27

## 2019-07-05 DIAGNOSIS — I10 ESSENTIAL HYPERTENSION: ICD-10-CM

## 2019-07-05 RX ORDER — LOSARTAN POTASSIUM 100 MG/1
100 TABLET ORAL DAILY
Qty: 90 TABLET | Refills: 1 | OUTPATIENT
Start: 2019-07-05

## 2019-10-14 ENCOUNTER — OFFICE VISIT (OUTPATIENT)
Dept: FAMILY MEDICINE CLINIC | Facility: CLINIC | Age: 64
End: 2019-10-14
Payer: COMMERCIAL

## 2019-10-14 ENCOUNTER — TELEPHONE (OUTPATIENT)
Dept: FAMILY MEDICINE CLINIC | Facility: CLINIC | Age: 64
End: 2019-10-14

## 2019-10-14 VITALS
HEIGHT: 63 IN | HEART RATE: 73 BPM | BODY MASS INDEX: 25.16 KG/M2 | WEIGHT: 142 LBS | DIASTOLIC BLOOD PRESSURE: 80 MMHG | SYSTOLIC BLOOD PRESSURE: 120 MMHG | OXYGEN SATURATION: 98 % | TEMPERATURE: 97.7 F

## 2019-10-14 DIAGNOSIS — R79.89 LOW VITAMIN D LEVEL: ICD-10-CM

## 2019-10-14 DIAGNOSIS — E78.5 HYPERLIPIDEMIA, UNSPECIFIED HYPERLIPIDEMIA TYPE: Primary | ICD-10-CM

## 2019-10-14 DIAGNOSIS — I10 ESSENTIAL HYPERTENSION: ICD-10-CM

## 2019-10-14 DIAGNOSIS — Z23 NEED FOR PNEUMOCOCCAL VACCINATION: ICD-10-CM

## 2019-10-14 DIAGNOSIS — Z12.39 SCREENING FOR BREAST CANCER: Primary | ICD-10-CM

## 2019-10-14 DIAGNOSIS — M35.00 SJOGREN'S SYNDROME, WITH UNSPECIFIED ORGAN INVOLVEMENT (HCC): ICD-10-CM

## 2019-10-14 PROCEDURE — 99214 OFFICE O/P EST MOD 30 MIN: CPT | Performed by: NURSE PRACTITIONER

## 2019-10-14 PROCEDURE — 3074F SYST BP LT 130 MM HG: CPT | Performed by: NURSE PRACTITIONER

## 2019-10-14 PROCEDURE — 3008F BODY MASS INDEX DOCD: CPT | Performed by: NURSE PRACTITIONER

## 2019-10-14 PROCEDURE — 3079F DIAST BP 80-89 MM HG: CPT | Performed by: NURSE PRACTITIONER

## 2019-10-14 PROCEDURE — 90471 IMMUNIZATION ADMIN: CPT

## 2019-10-14 PROCEDURE — 90732 PPSV23 VACC 2 YRS+ SUBQ/IM: CPT

## 2019-10-14 RX ORDER — ROSUVASTATIN CALCIUM 10 MG/1
10 TABLET, COATED ORAL DAILY
Qty: 30 TABLET | Refills: 5 | Status: SHIPPED | OUTPATIENT
Start: 2019-10-14 | End: 2020-01-08 | Stop reason: SDUPTHER

## 2019-10-14 RX ORDER — CHLORAL HYDRATE 500 MG
1 CAPSULE ORAL 3 TIMES DAILY
Qty: 90 CAPSULE | Refills: 3
Start: 2019-10-14

## 2019-10-14 RX ORDER — LOSARTAN POTASSIUM 100 MG/1
100 TABLET ORAL DAILY
Qty: 90 TABLET | Refills: 1 | Status: CANCELLED | OUTPATIENT
Start: 2019-10-14

## 2019-10-14 NOTE — PROGRESS NOTES
Assessment/Plan:  Re chk in 4 weeks  Hyperlipidemia  lipitor fell off - April [id number]; HDL 34 - will restart statin - crestor better choice in lieu of systemic inflammation as evident by instestinal metaplasia and autoimmune do  Add fish oil- discussed better brands - tyr to inc to 3 times a day  Discussed no gluten diet  Low vitamin D level  Re chk level    Hypertension  Pt off arb for one week ( took herself off)- bp stable - will keep off antihypertensive and re eval at nexy visit  Goal is 140/80       Diagnoses and all orders for this visit:    Hyperlipidemia, unspecified hyperlipidemia type  -     rosuvastatin (CRESTOR) 10 MG tablet; Take 1 tablet (10 mg total) by mouth daily  -     Comprehensive metabolic panel; Future  -     Lipid panel; Future  -     Vitamin D 25 hydroxy; Future    Essential hypertension  -     Omega-3 1000 MG CAPS; Take 1 capsule (1,000 mg total) by mouth 3 (three) times a day  -     Comprehensive metabolic panel; Future  -     Lipid panel; Future  -     Vitamin D 25 hydroxy; Future    Sjogren's syndrome, with unspecified organ involvement ([facility] )  -     rosuvastatin (CRESTOR) 10 MG tablet; Take 1 tablet (10 mg total) by mouth daily    Need for pneumococcal vaccination  -     PNEUMOCOCCAL POLYSACCHARIDE VACCINE 23-VALENT =>1YO SQ IM    Low vitamin D level    Other orders  -     Cancel: losartan (COZAAR) 100 MG tablet; Take 1 tablet (100 mg total) by mouth daily          Subjective:      Patient ID: Kb Calvo is a 59 y o  female  HPI  Follow up w/ daughter  multiple co morbidities including Sjogren's syndrome, Salazar's esophagus with gastritis, gastroparesis, biliary cirrhosis, dysphagia, vit d def, depression and anxiety and hypertension  Pt under the care of  YANE rheumatoid, Jennifer Martinez, [facility]  I am following follow ing HTN/ dyslipidemia  Med list - stopped arb cozaar one week ago - afraid of recall    Not taking statin/ calcium/vit d  Still w/ chronic abd pains       The following portions of the patient's history were reviewed and updated as appropriate: allergies, past social history, past surgical history and problem list     Review of Systems   Constitutional: Negative for activity change and appetite change  HENT: Negative  Respiratory: Negative  Cardiovascular: Negative  Negative for chest pain  Gastrointestinal: Negative  Musculoskeletal: Positive for arthralgias and myalgias  All other systems reviewed and are negative  Objective:     BMI Counseling: Body mass index is 25 15 kg/m²  The BMI is above normal  Nutrition recommendations include 3-5 servings of fruits/vegetables daily  /80 (BP Location: Left arm, Patient Position: Sitting, Cuff Size: Adult)   Pulse 73   Temp 97 7 °F (36 5 °C) (Oral)   Ht 5' 3" (1 6 m)   Wt 64 4 kg (142 lb)   SpO2 98%   BMI 25 15 kg/m²          Physical Exam   Constitutional: She is oriented to person, place, and time  She appears well-developed and well-nourished  No distress  HENT:   Head: Normocephalic  Right Ear: Tympanic membrane and external ear normal  No decreased hearing is noted  Left Ear: Tympanic membrane and external ear normal  No decreased hearing is noted  Mouth/Throat: Oropharynx is clear and moist    Eyes: Pupils are equal, round, and reactive to light  Conjunctivae are normal    Neck: Normal range of motion  Neck supple  No thyromegaly present  Cardiovascular: Normal rate, regular rhythm, normal heart sounds and intact distal pulses  No murmur heard  Pulmonary/Chest: Effort normal and breath sounds normal  No respiratory distress  Abdominal: Soft  Bowel sounds are normal    Lymphadenopathy:     She has no cervical adenopathy  Neurological: She is alert and oriented to person, place, and time  She has normal reflexes  No cranial nerve deficit  Skin: Skin is warm and dry  Psychiatric: She has a normal mood and affect   Her behavior is normal

## 2019-10-14 NOTE — PATIENT INSTRUCTIONS
Dieta aureliano de gluten   LO QUE NECESITA SABER:   Sarwat dieta aureliano de gluten es un plan alimenticio que no contiene gluten  El gluten es sarwat proteína que se encuentra en el miguel, mckeon y [place]  El gluten se encuentra en varios panes, pastas, cereales, pasteles, tartas y galletas  Algunos suplementos vitamínicos y medicamentos podrían contener gluten  Usted necesita seguir la siguiente dieta por el tk de bowers tj si tiene sarwat enfermedad celíaca, dermatitis herpetiforme o sensibilidad al gluten no celíaca  INSTRUCCIONES SOBRE EL [facility]:   Comuníquese con bowers médico o dietista si:   · Usted debe chandana un medicamento o vitamina que contiene gluten  · Los signos y síntomas de bowers condición Fauzia Ammon  · Usted está perdiendo peso diariamente sin intentarlo  · Usted tiene preguntas o inquietudes acerca de bowers condición o cuidado  Programe sarwat damaso con bowers médico o dietista cookie se le indique:  Anote clarita preguntas para que se acuerde de hacerlas camilla clarita visitas  Alimentos que se deben evitar:  No  coma alimentos que [employer] siguientes ingredientes:  · La [place], hossein de [place], extracto de cebada, mckeon, mijo, sémola y triticale    · [place]y, salvado de miguel, germen de miguel y almidón de miguel     · Las variedades de miguel cookie kamut y espelta    · [place], cuscús, miguel hamida, [place] de cereales y orzo    · sophie Brandt [place]     · La julienne que no está etiquetada cookie aureliano de gluten, a menos que bowers dietista le haya permitido comer sarwat cantidad pequeña  · Extracto de hossein y saborizante de hossein    · [place], espelta, faro, panko, seitán y [employer] que puede comer:  Elija alimentos que estén etiquetados cookie libres de gluten  Usted podría consumir avenas libres de gluten o julienne regular en cantidades pequeñas  Consulte con bowers dietista si comer julienne es seguro para usted   Usted puede consumir alimentos elaborados con los siguientes tipos de granos y almidones: · Arun Rife de papa y [place] de papa    · Soya, tapioca o teff    · Arroz, salvado de [place], quinua, sagú, y sorgo    · [place]ema [place], arrurruz, y miguel sarraceno    · [employer] de nueces, frijoles y semillas    · Aryan, mijo y montina  Ejemplos de alimentos libres de gluten:   · Frutas y verduras frescas    · Huevos, carne, pescado y carne de ave    · Frijoles secos, lentejas y chícharos    · [employer] que tienen 100% de jugo, café, té, cacao y refrescos    · [place] y aceites, cookie la [place], la [place] y los aceites vegetales, de canola y de [place]    · [place] regular sin sabor, queso requesón, la mayoría de los yogurs y el queso añejo cookie el queso cheddar    · [place] regular, crema agria, queso crema, la mayoría de los helados y sorbetes    · [place] de arroz, de sémola, de arroz inflado y de arroz café o coppola    · Tacos y tortillas de maíz  Ejemplo del menú para 1 día:   · Desayuno:  Huevos tibios, pan emerson sin gluten con mantequilla y [place]    · [place] de la mañana:  Pasteles de arroz o galletas saladas sin gluten    · Almuerzo:  Ensalada de atún con tomate y Paula y Sari [place]     · [place] de la tarde:  Trozos de zanahoria    · Robina:  Forrest a la elidu, papa al horno, ejotes y helado de fresa  Maneras de hacer que la dieta aureliano de gluten sea parte de bowers estilo de tj:   · Programe sarwat damaso con bowers médico o dietista cookie se le indique  Podría tomarle un tiempo para aprender cómo seguir sarwat dieta aureliano de gluten apropiadamente  Bowers dietista puede ayudarlo a encontrar maneras de integrar esta dieta en bowers estilo de tj  · Aprenda a leer las etiquetas de los alimentos  El gluten se encuentra en varios alimentos y bebidas  Es posible que no esté mark cuáles alimentos contienen gluten  Incluya sarwat variedad de alimentos permitidos para que pueda obtener todos los nutrientes que necesita  · Prepárese para las comidas en los restaurantes    Lleve con [employer] productos permitidos en esta dieta para cuando esté lejos de bowers casa  Illene Shells a los sitios de Internet de los restaurantes o llame con anticipación para saber si tiene alimentos libres de gluten  Informe a bowers mesero que usted no puede comer miguel, [place]  Pregunte cómo preparan la comida  · Prepare lo alimentos libres de gluten por separado de los otros alimentos  La contaminación cruzada es cuando los alimentos que contienen gluten se mezclan con los alimentos libres de gluten  Evite la contaminación cruzada limpiando sagar los muebles de la cocina y las tablas para picar para remover cualquier migaja que contenga gluten  [employer] utensilios de cocina, los coladores y los sartenes antes de cocinar los alimentos libres de gluten  Compre un tostador adicional para usar con los panes libres de gluten  Compre la [place], [place], [place] o crema de cacahuate por separado para usar en los panes libres de gluten o para evitar la contaminación cruzada  Estos alimentos también están disponibles en contenedores que solo se aprietan para que salga el contenido  · Incluya alimentos libres de gluten naturalmente que son altos en linda, folato y vitamina B12  Los [employer] en linda y vitamina B12 incluyen a la carne, pescado, carne de [place], hígado y [place]  Los [employer] en folato incluyen brócoli, espárragos, jugo de [place], legumbres, cacahuates, nueces y semillas de [place]  · Pregunte a bowers médico si usted necesita algún suplemento de vitaminas y minerales  La enfermedad celíaca y la dermatitis herpetiforme pueden provocar daño a clarita intestinos  Yuliet daño puede disminuir la absorción de ciertas vitaminas y minerales  También varios cereales, pastas y panes no están fortificados con vitamina B y lidna  © 2017 [address] [employer] Information is for End User's use only and may not be sold, redistributed or otherwise used for commercial purposes   All illustrations and images included in [employer] are the copyrighted property of A DEE DEE A M , Inc  or Parth Galan  Esta información es sólo para uso en educación  Bowers intención no es darle un consejo médico sobre enfermedades o tratamientos  Colsulte con bowers Jignesh Hu farmacéutico antes de seguir cualquier régimen médico para saber si es seguro y efectivo para usted

## 2019-10-14 NOTE — ASSESSMENT & PLAN NOTE
Pt off arb for one week ( took herself off)- bp stable - will keep off antihypertensive and re eval at nexy visit    Goal is 140/80

## 2019-10-14 NOTE — ASSESSMENT & PLAN NOTE
lipitor fell off - April [id number]; HDL 34 - will restart statin - crestor better choice in lieu of systemic inflammation as evident by instestinal metaplasia and autoimmune do  Add fish oil- discussed better brands - tyr to inc to 3 times a day  Discussed no gluten diet

## 2019-11-25 ENCOUNTER — TELEPHONE (OUTPATIENT)
Dept: FAMILY MEDICINE CLINIC | Facility: CLINIC | Age: 64
End: 2019-11-25

## 2019-12-10 DIAGNOSIS — R79.89 LOW VITAMIN D LEVEL: ICD-10-CM

## 2019-12-11 RX ORDER — AVOBENZONE, HOMOSALATE, OCTISALATE, OCTOCRYLENE 30; 100; 50; 25 MG/ML; MG/ML; MG/ML; MG/ML
SPRAY TOPICAL
Qty: 90 TABLET | Refills: 0 | Status: SHIPPED | OUTPATIENT
Start: 2019-12-11 | End: 2021-05-19 | Stop reason: SDUPTHER

## 2019-12-30 ENCOUNTER — APPOINTMENT (OUTPATIENT)
Dept: LAB | Facility: HOSPITAL | Age: 64
End: 2019-12-30
Payer: COMMERCIAL

## 2019-12-30 DIAGNOSIS — E78.5 HYPERLIPIDEMIA, UNSPECIFIED HYPERLIPIDEMIA TYPE: ICD-10-CM

## 2019-12-30 DIAGNOSIS — I10 ESSENTIAL HYPERTENSION: ICD-10-CM

## 2019-12-30 LAB
25(OH)D3 SERPL-MCNC: 30.4 NG/ML (ref 30–100)
ALBUMIN SERPL BCP-MCNC: 3.3 G/DL (ref 3.5–5)
ALP SERPL-CCNC: 113 U/L (ref 46–116)
ALT SERPL W P-5'-P-CCNC: 55 U/L (ref 12–78)
ANION GAP SERPL CALCULATED.3IONS-SCNC: 5 MMOL/L (ref 4–13)
AST SERPL W P-5'-P-CCNC: 29 U/L (ref 5–45)
BILIRUB SERPL-MCNC: 0.76 MG/DL (ref 0.2–1)
BUN SERPL-MCNC: 15 MG/DL (ref 5–25)
CALCIUM SERPL-MCNC: 9.1 MG/DL (ref 8.3–10.1)
CHLORIDE SERPL-SCNC: 107 MMOL/L (ref 100–108)
CHOLEST SERPL-MCNC: 236 MG/DL (ref 50–200)
CO2 SERPL-SCNC: 26 MMOL/L (ref 21–32)
CREAT SERPL-MCNC: 0.71 MG/DL (ref 0.6–1.3)
GFR SERPL CREATININE-BSD FRML MDRD: 91 ML/MIN/1.73SQ M
GLUCOSE P FAST SERPL-MCNC: 85 MG/DL (ref 65–99)
HDLC SERPL-MCNC: 39 MG/DL
LDLC SERPL CALC-MCNC: 181 MG/DL (ref 0–100)
NONHDLC SERPL-MCNC: 197 MG/DL
POTASSIUM SERPL-SCNC: 3.8 MMOL/L (ref 3.5–5.3)
PROT SERPL-MCNC: 8.3 G/DL (ref 6.4–8.2)
SODIUM SERPL-SCNC: 138 MMOL/L (ref 136–145)
TRIGL SERPL-MCNC: 78 MG/DL

## 2019-12-30 PROCEDURE — 36415 COLL VENOUS BLD VENIPUNCTURE: CPT

## 2019-12-30 PROCEDURE — 80061 LIPID PANEL: CPT

## 2019-12-30 PROCEDURE — 82306 VITAMIN D 25 HYDROXY: CPT

## 2019-12-30 PROCEDURE — 80053 COMPREHEN METABOLIC PANEL: CPT

## 2020-01-08 ENCOUNTER — OFFICE VISIT (OUTPATIENT)
Dept: FAMILY MEDICINE CLINIC | Facility: CLINIC | Age: 65
End: 2020-01-08
Payer: COMMERCIAL

## 2020-01-08 VITALS
DIASTOLIC BLOOD PRESSURE: 80 MMHG | SYSTOLIC BLOOD PRESSURE: 120 MMHG | HEART RATE: 80 BPM | HEIGHT: 63 IN | BODY MASS INDEX: 26.05 KG/M2 | RESPIRATION RATE: 17 BRPM | TEMPERATURE: 99.2 F | WEIGHT: 147 LBS | OXYGEN SATURATION: 98 %

## 2020-01-08 DIAGNOSIS — I10 ESSENTIAL HYPERTENSION: ICD-10-CM

## 2020-01-08 DIAGNOSIS — M35.00 SJOGREN'S SYNDROME, WITH UNSPECIFIED ORGAN INVOLVEMENT (HCC): ICD-10-CM

## 2020-01-08 DIAGNOSIS — R41.3 MEMORY LOSS: Primary | ICD-10-CM

## 2020-01-08 DIAGNOSIS — E78.5 HYPERLIPIDEMIA, UNSPECIFIED HYPERLIPIDEMIA TYPE: ICD-10-CM

## 2020-01-08 PROBLEM — R79.89 ELEVATED LIVER FUNCTION TESTS: Status: RESOLVED | Noted: 2018-10-25 | Resolved: 2020-01-08

## 2020-01-08 PROCEDURE — 1036F TOBACCO NON-USER: CPT | Performed by: FAMILY MEDICINE

## 2020-01-08 PROCEDURE — 3074F SYST BP LT 130 MM HG: CPT | Performed by: FAMILY MEDICINE

## 2020-01-08 PROCEDURE — 3008F BODY MASS INDEX DOCD: CPT | Performed by: FAMILY MEDICINE

## 2020-01-08 PROCEDURE — 3079F DIAST BP 80-89 MM HG: CPT | Performed by: FAMILY MEDICINE

## 2020-01-08 PROCEDURE — 99214 OFFICE O/P EST MOD 30 MIN: CPT | Performed by: FAMILY MEDICINE

## 2020-01-08 RX ORDER — ROSUVASTATIN CALCIUM 10 MG/1
10 TABLET, COATED ORAL DAILY
Qty: 30 TABLET | Refills: 5 | Status: SHIPPED | OUTPATIENT
Start: 2020-01-08 | End: 2020-07-13 | Stop reason: SDUPTHER

## 2020-01-08 NOTE — PROGRESS NOTES
Assessment/Plan:    Hyperlipidemia  Take crestor daily  Recheck lipids in 6 months  Memory loss  Referral sent to neurology  Hypertension  Diet controlled of medication  Diagnoses and all orders for this visit:    Memory loss  -     Ambulatory referral to Neurology; Future    Hyperlipidemia, unspecified hyperlipidemia type  -     rosuvastatin (CRESTOR) 10 MG tablet; Take 1 tablet (10 mg total) by mouth daily  -     Lipid panel; Future    Sjogren's syndrome, with unspecified organ involvement ([facility] [address] )  -     rosuvastatin (CRESTOR) 10 MG tablet; Take 1 tablet (10 mg total) by mouth daily    Essential hypertension          Subjective:      Patient ID: Gypsy Real is a 62 y o  female  Here for chronic conditions check up  Hypertension is diet controlled  Also has hyperlipidemia and was given crestor but she did not pick this medication  Goes to [facility] rheumatology but complaince has been an issue and she was discontinued off of azathioprine in April  Lab work was reviewed with pt  Family states the patient has been losing memory  She got lost in the supermarket about a year ago and had to call her daughter on the phone to help  She also reports 3 episodes of sudden bouts of confusion about her location while she was standing outside of her house                The following portions of the patient's history were reviewed and updated as appropriate:   She   Patient Active Problem List    Diagnosis Date Noted    Memory loss 01/08/2020    Abnormal mammogram 12/03/2018    Depression 10/25/2018    Low vitamin D level 10/25/2018    Ovarian cyst 10/25/2018    Osteopenia 10/25/2018    Hyperlipidemia 10/25/2018    Esophageal reflux 10/25/2018    Gastroparesis 08/22/2017    Intestinal metaplasia of gastric mucosa 11/10/2015    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    Diverticulosis 09/14/2015    Fibromyalgia 03/12/2015    Sjogren's syndrome ([facility] [address] ) 03/11/2015    Lumbar spondylosis 03/11/2015  Gastritis 06/04/2014    Dysphagia 11/25/2013    Hepatic cyst 07/22/2013    Biliary cirrhosis ([facility] [address] ) 04/23/2013    Tricuspid regurgitation 10/23/2012    Hypertension 10/23/2012     She  has a past surgical history that includes Cholecystectomy; Hysterectomy; Breast biopsy (Right); Colonoscopy; and Upper gastrointestinal endoscopy  Her family history includes Breast cancer in her sister; Cholelithiasis in her family; Depression in her family and sister; Diabetes in her brother; Gallbladder disease in her family; Heart attack in her mother; Heart disease in her mother; Hyperlipidemia in her mother; Hypertension in her mother; Other in her sister; Ovarian cancer in her sister; Stroke in her grandchild and mother  She  reports that she has never smoked  She has never used smokeless tobacco  She reports that she does not drink alcohol or use drugs  Current Outpatient Medications   Medication Sig Dispense Refill    esomeprazole (NexIUM) 40 MG capsule   0    hydrOXYzine HCL (ATARAX) 50 mg tablet Take 50 mg by mouth daily at bedtime  0    Omega-3 1000 MG CAPS Take 1 capsule (1,000 mg total) by mouth 3 (three) times a day 90 capsule 3    RA VITAMIN D-3 25 MCG (1000 UT) tablet take 1 tablet by mouth daily 90 tablet 0    risperiDONE (RisperDAL) 0 5 mg tablet Take 0 5 mg by mouth daily at bedtime  0    rosuvastatin (CRESTOR) 10 MG tablet Take 1 tablet (10 mg total) by mouth daily 30 tablet 5    sertraline (ZOLOFT) 50 mg tablet Take 50 mg by mouth daily      traZODone (DESYREL) 50 mg tablet Take 50 mg by mouth daily at bedtime  0    calcium carbonate (OS-MOLLY) 600 MG tablet Take 1 tablet (600 mg total) by mouth daily (Patient not taking: Reported on 10/14/2019) 90 tablet 3     No current facility-administered medications for this visit       Review of Systems   Constitutional: Negative for fever and unexpected weight change  HENT: Negative for ear pain, sore throat and trouble swallowing      Eyes: Negative for pain and visual disturbance  Respiratory: Negative for cough, chest tightness, shortness of breath and wheezing  Cardiovascular: Negative for chest pain  Gastrointestinal: Negative for abdominal distention, abdominal pain, blood in stool, constipation, diarrhea, nausea and vomiting  Endocrine: Negative for polydipsia and polyuria  Genitourinary: Negative for dysuria and hematuria  Musculoskeletal: Negative for back pain and myalgias  Skin: Negative for rash  Neurological: Negative for syncope and headaches  Psychiatric/Behavioral: Negative for suicidal ideas  PHQ-9 Depression Screening    PHQ-9:    Frequency of the following problems over the past two weeks:                Objective:      /80 (BP Location: Left arm, Patient Position: Sitting, Cuff Size: Standard)   Pulse 80   Temp 99 2 °F (37 3 °C) (Tympanic)   Resp 17   Ht 5' 3" (1 6 m)   Wt 66 7 kg (147 lb)   SpO2 98%   BMI 26 04 kg/m²          Physical Exam   Constitutional: She is oriented to person, place, and time  She appears well-developed and well-nourished  HENT:   Head: Normocephalic and atraumatic  Right Ear: External ear normal    Left Ear: External ear normal    Mouth/Throat: No oropharyngeal exudate  Eyes: Pupils are equal, round, and reactive to light  Conjunctivae and EOM are normal  No scleral icterus  Neck: Normal range of motion  Neck supple  Cardiovascular: Normal rate, regular rhythm and intact distal pulses  Exam reveals no gallop and no friction rub  No murmur heard  Pulmonary/Chest: Effort normal and breath sounds normal  No respiratory distress  She has no wheezes  She has no rales  Abdominal: Soft  Bowel sounds are normal  She exhibits no distension and no mass  There is no tenderness  There is no rebound  Musculoskeletal: Normal range of motion  She exhibits no edema  Neurological: She is alert and oriented to person, place, and time  Skin: Skin is warm and dry  Psychiatric: She has a normal mood and affect

## 2020-01-08 NOTE — PATIENT INSTRUCTIONS
barretts esophagus  schedule an appt with neurology  Start taking crestor  Follow up in 6 months and get lipid panel before visit

## 2020-01-10 NOTE — ED PROVIDER NOTES
History  Chief Complaint   Patient presents with    Abdominal Pain     PT PRESENTS WITH EPIGASTRIC ABDOMINAL PAIN THAT WRAPS AROUND TO RIGHT SIDE OCCASIONALLY, PER DAUGHTER PT HAS HAD PAIN "EVER SINCE I CAN REMEMBER WHEN I WAS LITTLE" REPORTS PAIN HAS INCREASED OVER THE LAST WEEK  HAS BEEN DX WITH GASTRITIS PREVIOUSLY, AND WAS SEEN AT [facility] TWO MONTHS AGO AND PER DAUGHTER DONT WANT TO RUN ANYMORE TEST BECAUSE SHE HAS HAD A LOT ALREADY  ALSO REPORTS NAUSEA AND DIARRHEA     Patient is a 65year old female with worsening upper abdominal pain for past several days with nausea, vomiting and diarrhea  Has had chronic upper abdominal pain and has gastritis and GERD and is on nexium  Has had prior ccy  No travel  No ill contacts  No raw meat, eggs, fish or recent abx use  No GI bleeding  No urinary sx  Did not drive here  No fever  [facility] [address] [facility] [address] ED at [facility] on 5/6/17 for pyelonephritis  [facility] website checked on this patient and last Rx filled was on 5/7/17 for vicodin for 3 day supply  Has had prior testing in the past including US  History provided by:  Patient and relative (daughter)      Prior to Admission Medications   Prescriptions Last Dose Informant Patient Reported?  Taking?   esomeprazole (NexIUM) 40 MG capsule   Yes Yes   Sig: Take 40 mg by mouth every morning before breakfast   losartan (COZAAR) 100 MG tablet   Yes Yes   Sig: Take by mouth   risperiDONE (RisperDAL) 0 5 mg tablet   Yes Yes   Sig: Take 0 5 mg by mouth 2 (two) times a day   sertraline (ZOLOFT) 50 mg tablet   Yes Yes   Sig: Take 50 mg by mouth daily   traZODone (DESYREL) 50 mg tablet   Yes Yes   Sig: Take 50 mg by mouth daily at bedtime      Facility-Administered Medications: None       Past Medical History:   Diagnosis Date    Anxiety     Depression     Gastritis     GERD (gastroesophageal reflux disease)     Hypertension        Past Surgical History:   Procedure Laterality Date    CHOLECYSTECTOMY      HYSTERECTOMY         History reviewed  No pertinent family history  I have reviewed and agree with the history as documented  Social History   Substance Use Topics    Smoking status: Never Smoker    Smokeless tobacco: Never Used    Alcohol use Not on file        Review of Systems   Constitutional: Negative for fever  Gastrointestinal: Positive for abdominal pain, diarrhea, nausea and vomiting  Negative for blood in stool  Genitourinary: Negative for difficulty urinating  All other systems reviewed and are negative  Physical Exam  ED Triage Vitals [04/14/18 0346]   Temperature Pulse Respirations Blood Pressure SpO2   98 7 °F (37 1 °C) 77 18 138/88 97 %      Temp Source Heart Rate Source Patient Position - Orthostatic VS BP Location FiO2 (%)   Oral Monitor Sitting Right arm --      Pain Score       Worst Possible Pain           Orthostatic Vital Signs  Vitals:    04/14/18 0346 04/14/18 0415   BP: 138/88 137/87   Pulse: 77 76   Patient Position - Orthostatic VS: Sitting Sitting       Physical Exam   Constitutional: She is oriented to person, place, and time  She appears well-developed and well-nourished  She appears distressed (moderate)  HENT:   Head: Normocephalic and atraumatic  Somewhat moist mucous membranes  Eyes: No scleral icterus  Neck: No tracheal deviation present  Cardiovascular: Normal rate, regular rhythm and normal heart sounds  No murmur heard  Pulmonary/Chest: Effort normal and breath sounds normal  No stridor  No respiratory distress  Abdominal: Soft  Bowel sounds are normal  She exhibits no distension  There is tenderness (diffuse upper)  There is no rebound and no guarding  Musculoskeletal: She exhibits no edema or deformity  Neurological: She is alert and oriented to person, place, and time  Skin: Skin is warm and dry  No rash noted  Psychiatric: She has a normal mood and affect  Nursing note and vitals reviewed        ED Medications  Medications   sodium chloride 0 9 % infusion (125 mL/hr Intravenous New Bag 4/14/18 0530)   sodium chloride 0 9 % bolus 1,000 mL (0 mL Intravenous Stopped 4/14/18 0548)   ondansetron (ZOFRAN) injection 4 mg (4 mg Intravenous Given 4/14/18 0419)   HYDROmorphone (DILAUDID) injection 0 5 mg (0 5 mg Intravenous Given 4/14/18 0419)   famotidine (PEPCID) injection 20 mg (20 mg Intravenous Given 4/14/18 0419)   iohexol (OMNIPAQUE) 240 MG/ML solution 50 mL (50 mL Oral Given 4/14/18 0425)   HYDROmorphone (DILAUDID) injection 0 5 mg (0 5 mg Intravenous Given 4/14/18 0621)   aluminum-magnesium hydroxide-simethicone (MYLANTA) 200-200-20 mg/5 mL oral suspension 20 mL (20 mL Oral Given 4/14/18 0621)   lidocaine viscous (XYLOCAINE) 2 % mucosal solution 15 mL (15 mL Swish & Spit Given 4/14/18 0620)   iohexol (OMNIPAQUE) 350 MG/ML injection (SINGLE-DOSE) 100 mL (100 mL Intravenous Given 4/14/18 0610)       Diagnostic Studies  Results Reviewed     Procedure Component Value Units Date/Time    Blood culture #1 [[id number]] Collected:  04/14/18 0626    Lab Status: In process Specimen:  Blood from Arm, Right Updated:  04/14/18 0632    Blood culture #2 [[id number]] Collected:  04/14/18 0626    Lab Status: In process Specimen:  Blood from Arm, Left Updated:  04/14/18 0632    Urine Microscopic [[id number]]  (Abnormal) Collected:  04/14/18 0440    Lab Status:  Final result Specimen:  Urine from Urine, Clean Catch Updated:  04/14/18 0517     RBC, UA None Seen /hpf      WBC, UA 0-1 (A) /hpf      Epithelial Cells Occasional /hpf      Bacteria, UA None Seen /hpf     CBC and differential [[id number]]  (Normal) Collected:  04/14/18 0406    Lab Status:  Final result Specimen:  Blood from Arm, Right Updated:  04/14/18 0454     WBC 9 23 Thousand/uL      RBC 3 97 Million/uL      Hemoglobin 11 8 g/dL      Hematocrit 36 3 %      MCV 91 fL      MCH 29 7 pg      MCHC 32 5 g/dL      RDW 14 0 %      MPV 10 1 fL      Platelets 649 Thousands/uL     Narrative:        This is an appended report  These results have been appended to a previously verified report  Lactic acid, plasma [[id number]]  (Normal) Collected:  04/14/18 0406    Lab Status:  Final result Specimen:  Blood from Arm, Right Updated:  04/14/18 0446     LACTIC ACID 0 7 mmol/L     Narrative:         Result may be elevated if tourniquet was used during collection  Comprehensive metabolic panel [[id number]]  (Abnormal) Collected:  04/14/18 0406    Lab Status:  Final result Specimen:  Blood from Arm, Right Updated:  04/14/18 0445     Sodium 143 mmol/L      Potassium 3 6 mmol/L      Chloride 107 mmol/L      CO2 27 mmol/L      Anion Gap 9 mmol/L      BUN 14 mg/dL      Creatinine 0 84 mg/dL      Glucose 92 mg/dL      Calcium 9 1 mg/dL      AST 16 U/L      ALT 26 U/L      Alkaline Phosphatase 107 U/L      Total Protein 7 6 g/dL      Albumin 3 4 (L) g/dL      Total Bilirubin 0 50 mg/dL      eGFR 75 ml/min/1 73sq m     Narrative:         National Kidney Disease Education Program recommendations are as follows:  GFR calculation is accurate only with a steady state creatinine  Chronic Kidney disease less than 60 ml/min/1 73 sq  meters  Kidney failure less than 15 ml/min/1 73 sq  meters      Lipase [[id number]]  (Normal) Collected:  04/14/18 0406    Lab Status:  Final result Specimen:  Blood from Arm, Right Updated:  04/14/18 0445     Lipase 93 u/L     Protime-INR [[id number]]  (Normal) Collected:  04/14/18 0406    Lab Status:  Final result Specimen:  Blood from Arm, Right Updated:  04/14/18 0440     Protime 13 9 seconds      INR 1 04    APTT [[id number]]  (Normal) Collected:  04/14/18 0406    Lab Status:  Final result Specimen:  Blood from Arm, Right Updated:  04/14/18 0440     PTT 33 seconds     ED Urine Macroscopic [[id number]]  (Abnormal) Collected:  04/14/18 0440    Lab Status:  Final result Specimen:  Urine Updated:  04/14/18 0439     Color, UA Yellow     Clarity, UA Clear     pH, UA 6 0     Leukocytes, UA Negative     Nitrite, UA Negative Protein, UA Negative mg/dl      Glucose, UA Negative mg/dl      Ketones, UA Negative mg/dl      Urobilinogen, UA 0 2 E U /dl      Bilirubin, UA Negative     Blood, UA Trace (A)     Specific [place], UA 1 020    Narrative:       CLINITEK RESULT                 CT abdomen pelvis with contrast   ED Interpretation by Mamie Schaumann, MD (04/14 0631)   FINDINGS:      ABDOMEN      LOWER CHEST:  No clinically significant abnormality identified in the visualized lower chest       LIVER/BILIARY TREE:  Large lobulated thinly septated cyst in the right posterior dome again demonstrated, currently measuring 5 4 x 4 7 cm, minimally increased compared to prior   Additional small cyst in the right lobe of the liver, 12 x 10 mm, also    stable  GALLBLADDER:  Prior cholecystectomy   Moderate ectasia of the extrahepatic biliary tree to the level of the ampulla, increasing compared to prior, without filling defect   No intrahepatic ductal dilatation   Correlate with bilirubin levels  SPLEEN:  Unremarkable  PANCREAS:  Unremarkable  ADRENAL GLANDS:  Unremarkable  KIDNEYS/URETERS:  Unremarkable  No hydronephrosis  STOMACH AND BOWEL: Heladio Pierre is colonic diverticulosis without evidence of acute diverticulitis  APPENDIX:  No findings to suggest appendicitis  ABDOMINOPELVIC CAVITY:  No ascites or free intraperitoneal air  No lymphadenopathy  VESSELS:  Unremarkable for patient's age  PELVIS      REPRODUCTIVE ORGANS:  Unremarkable for patient's age  URINARY BLADDER:  Unremarkable  ABDOMINAL WALL/INGUINAL REGIONS:  Unremarkable  OSSEOUS STRUCTURES:  No acute fracture or destructive osseous lesion  Impression:        Large septated right hepatic dome cyst, chronically present, mildly increasing compared to priors   No new lesions evident        Prior cholecystectomy   No intrahepatic biliary ductal dilatation   Increasing dilatation of the common bile duct without filling defects evident  Workstation performed: [id number]         Final Result by Osvaldo Schaffer MD (20/52 6939)      Large septated right hepatic dome cyst, chronically present, mildly increasing compared to priors  No new lesions evident  Prior cholecystectomy  No intrahepatic biliary ductal dilatation  Increasing dilatation of the common bile duct without filling defects evident  Workstation performed: [id number]         XR chest 2 views   ED Interpretation by Maricarmen Martel MD (04/14 0790)   Elevated R hemidiaphragm read by me  Final Result by Pankaj Pascal MD (04/14 7055)      Bilateral subsegmental atelectasis at the bases  Workstation performed: [id number]                    Procedures  ECG 12 Lead Documentation  Date/Time: 4/14/2018 4:12 AM  Performed by: Mary Jackson  Authorized by: Mary Jackson     Indications / Diagnosis:  Abdominal pain  ECG reviewed by me, the ED Provider: yes    Patient location:  ED  Previous ECG:     Previous ECG:  Compared to current    Comparison ECG info:  5/19/14    Similarity:  No change  Rate:     ECG rate:  78    ECG rate assessment: normal    Rhythm:     Rhythm: sinus rhythm    Ectopy:     Ectopy: none    QRS:     QRS axis:  Normal    QRS intervals:  Normal  Conduction:     Conduction: normal    ST segments:     ST segments:  Normal  T waves:     T waves: normal    Comments:      Minimal voltage criteria for LVH, may be normal variant  Phone Contacts  ED Phone Contact    ED Course  ED Course as of Apr 14 0649   Sat Apr 14, 2018   2231 Labs d/w patient and daughter  Patient still with pain  Pain radiates into throat as per daughter  More IV dilaudid ordered  6199 CT findings d/w daughter and patient  No acute abdomen prior to discharge                                   MDM  Number of Diagnoses or Management Options  Diagnosis management comments: DDx including but not limited to: appendicitis, gastroenteritis, gastritis, PUD, GERD, gastroparesis, hepatitis, pancreatitis, colitis, enteritis, diverticulitis, food poisoning, mesenteric adenitis, mesenteric ischemia, IBD, IBS, ileus, bowel obstruction, volvulus, internal hernia, AAA, choledocholithiasis, perforated viscus, splenic etiology, constipation, pelvic pathology, renal colic, pyelonephritis, UTI; doubt cardiac etiology  Amount and/or Complexity of Data Reviewed  Clinical lab tests: ordered and reviewed  Tests in the radiology section of CPT®: ordered and reviewed  Decide to obtain previous medical records or to obtain history from someone other than the patient: yes  Obtain history from someone other than the patient: yes  Review and summarize past medical records: yes  Independent visualization of images, tracings, or specimens: yes      CritCare Time    Disposition  Final diagnoses:   Abdominal pain   Gastroenteritis   GERD (gastroesophageal reflux disease)   Liver cyst     Time reflects when diagnosis was documented in both MDM as applicable and the Disposition within this note     Time User Action Codes Description Comment    4/14/2018  6:46 AM Hillary Charlotte Add [R10 [id number]] Abdominal pain     4/14/2018  6:46 AM Hillary Charlotte Add [K52 [id number]] Gastroenteritis     4/14/2018  6:47 AM Hillary Charlotte Add [K21 [id number]] GERD (gastroesophageal reflux disease)     4/14/2018  6:47 AM Hillary Charlotte Add [[id number]] Liver cyst       ED Disposition     ED Disposition Condition Comment    Discharge  Tristen Gallardo discharge to home/self care  Condition at discharge: Stable        Follow-up Information     Follow up With Specialties Details Why Contact Info    Toni Castro MD Family Medicine Call in 2 days and own gastroenterologist  Clear fluids for 24-48 hours and then advance diet as tolerated  No greasy, fatty or spicy foods  Return sooner if increased pain, fever, vomiting, diarrhea, difficulty breathing or urinating  Can use imodium for diarrhea    [address] [facility] [address]          Patient's Medications   Discharge Prescriptions    DICYCLOMINE (BENTYL) 20 MG TABLET    Take 1 tablet (20 mg total) by mouth every 6 (six) hours for 5 days For crampy abdominal pain       Start Date: 4/14/2018 End Date: 4/19/2018       Order Dose: 20 mg       Quantity: 20 tablet    Refills: 0    METOCLOPRAMIDE (REGLAN) 10 MG TABLET    Take 1 tablet (10 mg total) by mouth 4 (four) times a day for 7 days As needed for nausea       Start Date: 4/14/2018 End Date: 4/21/2018       Order Dose: 10 mg       Quantity: 28 tablet    Refills: 0    SUCRALFATE (CARAFATE) 1 G TABLET    Take 1 tablet (1 g total) by mouth 4 (four) times a day for 5 days       Start Date: 4/14/2018 End Date: 4/19/2018       Order Dose: 1 g       Quantity: 20 tablet    Refills: 0     No discharge procedures on file      ED Provider  Electronically Signed by           Americo Auguste MD  04/14/18 3327 1. I was told the name of the physician that took care of my child while in the hospital.    2. I have been told about any important findings on my child's physical exam and my child's plan of care.    3. The doctor clearly explained my child's diagnosis and other possible diagnoses that were considered.    4. My child's doctor explained all the tests that were done and their results (if available). I understand that some of the test results may not be ready before we go home and I was told how I can get these results. I understand that a summary of my child's hospitalization and important test results will be shared with my child's outpatient doctor.    5. My child's doctor talked to me about what I need to do when we go home.    6. I understand what signs and symptoms to watch for. I understand what symptoms I would need to call my doctor for and/or return to the hospital.    7. I have the phone number to call the hospital for results and/or questions after I leave the hospital.

## 2020-02-27 DIAGNOSIS — R79.89 LOW VITAMIN D LEVEL: ICD-10-CM

## 2020-03-02 ENCOUNTER — TRANSCRIBE ORDERS (OUTPATIENT)
Dept: LAB | Facility: HOSPITAL | Age: 65
End: 2020-03-02

## 2020-03-02 ENCOUNTER — APPOINTMENT (OUTPATIENT)
Dept: LAB | Facility: HOSPITAL | Age: 65
End: 2020-03-02
Payer: COMMERCIAL

## 2020-03-02 DIAGNOSIS — F33.3 SEVERE RECURRENT MAJOR DEPRESSION WITH PSYCHOTIC FEATURES (HCC): ICD-10-CM

## 2020-03-02 DIAGNOSIS — E88.89 MADELUNG'S NECK (HCC): ICD-10-CM

## 2020-03-02 DIAGNOSIS — F33.3 SEVERE RECURRENT MAJOR DEPRESSION WITH PSYCHOTIC FEATURES (HCC): Primary | ICD-10-CM

## 2020-03-02 DIAGNOSIS — E78.5 HYPERLIPIDEMIA, UNSPECIFIED HYPERLIPIDEMIA TYPE: ICD-10-CM

## 2020-03-02 LAB
25(OH)D3 SERPL-MCNC: 37.1 NG/ML (ref 30–100)
ALBUMIN SERPL BCP-MCNC: 3.3 G/DL (ref 3.5–5)
ALP SERPL-CCNC: 119 U/L (ref 46–116)
ALT SERPL W P-5'-P-CCNC: 25 U/L (ref 12–78)
ANION GAP SERPL CALCULATED.3IONS-SCNC: 5 MMOL/L (ref 4–13)
AST SERPL W P-5'-P-CCNC: 21 U/L (ref 5–45)
BASOPHILS # BLD AUTO: 0.04 THOUSANDS/ΜL (ref 0–0.1)
BASOPHILS NFR BLD AUTO: 1 % (ref 0–1)
BILIRUB SERPL-MCNC: 0.46 MG/DL (ref 0.2–1)
BUN SERPL-MCNC: 16 MG/DL (ref 5–25)
CALCIUM SERPL-MCNC: 8.9 MG/DL (ref 8.3–10.1)
CHLORIDE SERPL-SCNC: 110 MMOL/L (ref 100–108)
CHOLEST SERPL-MCNC: 132 MG/DL (ref 50–200)
CO2 SERPL-SCNC: 25 MMOL/L (ref 21–32)
CREAT SERPL-MCNC: 0.7 MG/DL (ref 0.6–1.3)
EOSINOPHIL # BLD AUTO: 0.1 THOUSAND/ΜL (ref 0–0.61)
EOSINOPHIL NFR BLD AUTO: 1 % (ref 0–6)
ERYTHROCYTE [DISTWIDTH] IN BLOOD BY AUTOMATED COUNT: 13.2 % (ref 11.6–15.1)
GFR SERPL CREATININE-BSD FRML MDRD: 93 ML/MIN/1.73SQ M
GLUCOSE P FAST SERPL-MCNC: 79 MG/DL (ref 65–99)
HCT VFR BLD AUTO: 38.6 % (ref 34.8–46.1)
HDLC SERPL-MCNC: 39 MG/DL
HGB BLD-MCNC: 12.1 G/DL (ref 11.5–15.4)
IMM GRANULOCYTES # BLD AUTO: 0.02 THOUSAND/UL (ref 0–0.2)
IMM GRANULOCYTES NFR BLD AUTO: 0 % (ref 0–2)
LDLC SERPL CALC-MCNC: 74 MG/DL (ref 0–100)
LYMPHOCYTES # BLD AUTO: 2.96 THOUSANDS/ΜL (ref 0.6–4.47)
LYMPHOCYTES NFR BLD AUTO: 37 % (ref 14–44)
MCH RBC QN AUTO: 29.6 PG (ref 26.8–34.3)
MCHC RBC AUTO-ENTMCNC: 31.3 G/DL (ref 31.4–37.4)
MCV RBC AUTO: 94 FL (ref 82–98)
MONOCYTES # BLD AUTO: 0.95 THOUSAND/ΜL (ref 0.17–1.22)
MONOCYTES NFR BLD AUTO: 12 % (ref 4–12)
NEUTROPHILS # BLD AUTO: 4.04 THOUSANDS/ΜL (ref 1.85–7.62)
NEUTS SEG NFR BLD AUTO: 49 % (ref 43–75)
NONHDLC SERPL-MCNC: 93 MG/DL
NRBC BLD AUTO-RTO: 0 /100 WBCS
PLATELET # BLD AUTO: 349 THOUSANDS/UL (ref 149–390)
PMV BLD AUTO: 10.8 FL (ref 8.9–12.7)
POTASSIUM SERPL-SCNC: 3.7 MMOL/L (ref 3.5–5.3)
PROLACTIN SERPL-MCNC: 7.4 NG/ML
PROT SERPL-MCNC: 8.2 G/DL (ref 6.4–8.2)
RBC # BLD AUTO: 4.09 MILLION/UL (ref 3.81–5.12)
SODIUM SERPL-SCNC: 140 MMOL/L (ref 136–145)
TRIGL SERPL-MCNC: 94 MG/DL
TSH SERPL DL<=0.05 MIU/L-ACNC: 1.62 UIU/ML (ref 0.36–3.74)
WBC # BLD AUTO: 8.11 THOUSAND/UL (ref 4.31–10.16)

## 2020-03-02 PROCEDURE — 84146 ASSAY OF PROLACTIN: CPT

## 2020-03-02 PROCEDURE — 84443 ASSAY THYROID STIM HORMONE: CPT

## 2020-03-02 PROCEDURE — 82306 VITAMIN D 25 HYDROXY: CPT

## 2020-03-02 PROCEDURE — 80053 COMPREHEN METABOLIC PANEL: CPT

## 2020-03-02 PROCEDURE — 36415 COLL VENOUS BLD VENIPUNCTURE: CPT

## 2020-03-02 PROCEDURE — 80061 LIPID PANEL: CPT

## 2020-03-02 PROCEDURE — 85025 COMPLETE CBC W/AUTO DIFF WBC: CPT

## 2020-04-27 ENCOUNTER — TELEPHONE (OUTPATIENT)
Dept: NEUROLOGY | Facility: CLINIC | Age: 65
End: 2020-04-27

## 2020-05-12 ENCOUNTER — TELEPHONE (OUTPATIENT)
Dept: NEUROLOGY | Facility: CLINIC | Age: 65
End: 2020-05-12

## 2020-05-13 ENCOUNTER — TELEMEDICINE (OUTPATIENT)
Dept: NEUROLOGY | Facility: CLINIC | Age: 65
End: 2020-05-13
Payer: COMMERCIAL

## 2020-05-13 DIAGNOSIS — R41.3 MEMORY LOSS: ICD-10-CM

## 2020-05-13 PROCEDURE — 99203 OFFICE O/P NEW LOW 30 MIN: CPT | Performed by: PSYCHIATRY & NEUROLOGY

## 2020-05-27 ENCOUNTER — HOSPITAL ENCOUNTER (OUTPATIENT)
Dept: CT IMAGING | Facility: HOSPITAL | Age: 65
Discharge: HOME | End: 2020-05-27
Attending: PSYCHIATRY & NEUROLOGY
Payer: COMMERCIAL

## 2020-05-27 DIAGNOSIS — R41.3 MEMORY LOSS: ICD-10-CM

## 2020-05-27 PROCEDURE — 70450 CT HEAD/BRAIN W/O DYE: CPT

## 2020-05-29 ENCOUNTER — TELEPHONE (OUTPATIENT)
Dept: NEUROLOGY | Facility: CLINIC | Age: 65
End: 2020-05-29

## 2020-06-01 ENCOUNTER — TRANSCRIBE ORDERS (OUTPATIENT)
Dept: LAB | Facility: CLINIC | Age: 65
End: 2020-06-01

## 2020-06-01 ENCOUNTER — APPOINTMENT (OUTPATIENT)
Dept: LAB | Facility: CLINIC | Age: 65
End: 2020-06-01
Payer: COMMERCIAL

## 2020-06-01 DIAGNOSIS — R41.3 MEMORY LOSS: ICD-10-CM

## 2020-06-01 LAB — VIT B12 SERPL-MCNC: 415 PG/ML (ref 100–900)

## 2020-06-01 PROCEDURE — 36415 COLL VENOUS BLD VENIPUNCTURE: CPT

## 2020-06-01 PROCEDURE — 82607 VITAMIN B-12: CPT

## 2020-06-01 PROCEDURE — 86592 SYPHILIS TEST NON-TREP QUAL: CPT

## 2020-06-02 LAB — RPR SER QL: NORMAL

## 2020-07-01 ENCOUNTER — APPOINTMENT (OUTPATIENT)
Dept: LAB | Facility: CLINIC | Age: 65
End: 2020-07-01
Payer: COMMERCIAL

## 2020-07-01 ENCOUNTER — TRANSCRIBE ORDERS (OUTPATIENT)
Dept: LAB | Facility: CLINIC | Age: 65
End: 2020-07-01

## 2020-07-01 ENCOUNTER — TELEPHONE (OUTPATIENT)
Dept: FAMILY MEDICINE CLINIC | Facility: CLINIC | Age: 65
End: 2020-07-01

## 2020-07-01 LAB
CHOLEST SERPL-MCNC: 151 MG/DL (ref 50–200)
HDLC SERPL-MCNC: 33 MG/DL
LDLC SERPL CALC-MCNC: 96 MG/DL (ref 0–100)
NONHDLC SERPL-MCNC: 118 MG/DL
TRIGL SERPL-MCNC: 110 MG/DL

## 2020-07-01 PROCEDURE — 80061 LIPID PANEL: CPT

## 2020-07-01 PROCEDURE — 36415 COLL VENOUS BLD VENIPUNCTURE: CPT

## 2020-07-13 DIAGNOSIS — E78.5 HYPERLIPIDEMIA, UNSPECIFIED HYPERLIPIDEMIA TYPE: ICD-10-CM

## 2020-07-13 DIAGNOSIS — M35.00 SJOGREN'S SYNDROME, WITH UNSPECIFIED ORGAN INVOLVEMENT (HCC): ICD-10-CM

## 2020-07-14 RX ORDER — ROSUVASTATIN CALCIUM 10 MG/1
10 TABLET, COATED ORAL DAILY
Qty: 30 TABLET | Refills: 5 | Status: SHIPPED | OUTPATIENT
Start: 2020-07-14 | End: 2021-06-03 | Stop reason: SDUPTHER

## 2020-08-19 ENCOUNTER — OFFICE VISIT (OUTPATIENT)
Dept: FAMILY MEDICINE CLINIC | Facility: CLINIC | Age: 65
End: 2020-08-19
Payer: COMMERCIAL

## 2020-08-19 VITALS
HEIGHT: 63 IN | WEIGHT: 155.6 LBS | HEART RATE: 86 BPM | RESPIRATION RATE: 16 BRPM | DIASTOLIC BLOOD PRESSURE: 86 MMHG | OXYGEN SATURATION: 98 % | TEMPERATURE: 97.7 F | BODY MASS INDEX: 27.57 KG/M2 | SYSTOLIC BLOOD PRESSURE: 130 MMHG

## 2020-08-19 DIAGNOSIS — I10 ESSENTIAL HYPERTENSION: ICD-10-CM

## 2020-08-19 DIAGNOSIS — K21.9 GASTROESOPHAGEAL REFLUX DISEASE, ESOPHAGITIS PRESENCE NOT SPECIFIED: ICD-10-CM

## 2020-08-19 DIAGNOSIS — M35.00 SJOGREN'S SYNDROME, WITH UNSPECIFIED ORGAN INVOLVEMENT (HCC): ICD-10-CM

## 2020-08-19 DIAGNOSIS — E78.5 HYPERLIPIDEMIA, UNSPECIFIED HYPERLIPIDEMIA TYPE: ICD-10-CM

## 2020-08-19 DIAGNOSIS — Z12.11 SCREENING FOR MALIGNANT NEOPLASM OF COLON: ICD-10-CM

## 2020-08-19 DIAGNOSIS — K76.0 FATTY LIVER DISEASE, NONALCOHOLIC: ICD-10-CM

## 2020-08-19 DIAGNOSIS — Z12.39 SCREENING FOR BREAST CANCER: Primary | ICD-10-CM

## 2020-08-19 PROBLEM — R92.8 ABNORMAL MAMMOGRAM: Status: RESOLVED | Noted: 2018-12-03 | Resolved: 2020-08-19

## 2020-08-19 PROCEDURE — 90632 HEPA VACCINE ADULT IM: CPT

## 2020-08-19 PROCEDURE — 3075F SYST BP GE 130 - 139MM HG: CPT | Performed by: FAMILY MEDICINE

## 2020-08-19 PROCEDURE — 3079F DIAST BP 80-89 MM HG: CPT | Performed by: FAMILY MEDICINE

## 2020-08-19 PROCEDURE — 3008F BODY MASS INDEX DOCD: CPT | Performed by: FAMILY MEDICINE

## 2020-08-19 PROCEDURE — 1036F TOBACCO NON-USER: CPT | Performed by: FAMILY MEDICINE

## 2020-08-19 PROCEDURE — 99214 OFFICE O/P EST MOD 30 MIN: CPT | Performed by: FAMILY MEDICINE

## 2020-08-19 PROCEDURE — 90471 IMMUNIZATION ADMIN: CPT

## 2020-08-19 NOTE — PROGRESS NOTES
Assessment/Plan:    Esophageal reflux  Uncontrolled  Advised to schedule appt with gastroenterology  Discussed importance of diet and lifestyle modifications to control GERD symptoms  Avoid things which worsen heartburn (ex-coffee,tomato based products, spicy foods,  tobacco,alcohol,obesity,tight fitting clothing, citrus fruits, garlic, onion, chocolate and mint)  Discussed the importance of eating small, frequent meals instead of large meals  Elevate head end of the bed and do not lay down 2-3 hours following a meal       Hypertension   Diet controlled  Continue off medications  Continue low-salt diet  Hyperlipidemia    Well controlled on Crestor 10 mg daily  Repeat lipid panel in 6 months    Sjogren's syndrome ([facility] [address] )   Overdue for her appointment with rheumatology  Patient will schedule appointment  Fatty liver disease, nonalcoholic    Continue low-fat diet  Given 1st dose of hep a today  Diagnoses and all orders for this visit:    Screening for breast cancer  -     Mammo screening bilateral w cad; Future    Screening for malignant neoplasm of colon  -     Ambulatory referral to Gastroenterology; Future    Gastroesophageal reflux disease, esophagitis presence not specified    Essential hypertension  -     Basic metabolic panel; Future    Hyperlipidemia, unspecified hyperlipidemia type  -     Lipid panel; Future    Fatty liver disease, nonalcoholic  -     HEPATITIS A VACCINE ADULT IM    Sjogren's syndrome, with unspecified organ involvement (HCC)          Subjective:  Chronic conditions checkup     Patient ID: Génesis Glass is a 62 y o  female with a history of biliary cirrhosis, fibromyalgia, depression, GERD, hyperlipidemia, hypertension, intestinal metaplasia of gastric mucosa, osteopenia, surgeon syndrome    HPI  Mammogram is due in October  Pap is UTD  Has a gynecologist    Colonoscopy is due  Has a rheumatologist for Sjogren syndrome  She is due for an appt           The following portions of the patient's history were reviewed and updated as appropriate: allergies, current medications, past family history, past medical history, past social history, past surgical history and problem list     Review of Systems      PHQ-9 Depression Screening    PHQ-9:    Frequency of the following problems over the past two weeks:                Objective:      /86 (BP Location: Left arm, Patient Position: Sitting, Cuff Size: Adult)   Pulse 86   Temp 97 7 °F (36 5 °C) (Tympanic)   Resp 16   Ht 5' 3" (1 6 m)   Wt 70 6 kg (155 lb 9 6 oz)   SpO2 98%   BMI 27 56 kg/m²          Physical Exam  Constitutional:       Appearance: She is well-developed  HENT:      Head: Normocephalic and atraumatic  Right Ear: External ear normal       Left Ear: External ear normal       Mouth/Throat:      Pharynx: No oropharyngeal exudate  Eyes:      General: No scleral icterus  Conjunctiva/sclera: Conjunctivae normal       Pupils: Pupils are equal, round, and reactive to light  Neck:      Musculoskeletal: Normal range of motion and neck supple  Cardiovascular:      Rate and Rhythm: Normal rate and regular rhythm  Heart sounds: No murmur  No friction rub  No gallop  Pulmonary:      Effort: Pulmonary effort is normal  No respiratory distress  Breath sounds: Normal breath sounds  No wheezing or rales  Abdominal:      General: Bowel sounds are normal  There is no distension  Palpations: Abdomen is soft  There is no mass  Tenderness: There is no abdominal tenderness  There is no rebound  Musculoskeletal: Normal range of motion  Skin:     General: Skin is warm and dry  Neurological:      Mental Status: She is alert and oriented to person, place, and time             Recent Results (from the past 1344 hour(s))   Lipid panel    Collection Time: 07/01/20 11:30 AM   Result Value Ref Range    Cholesterol 151 50 - 200 mg/dL    Triglycerides 110 <=150 mg/dL    HDL, Direct 33 (L) >=40 mg/dL    LDL Calculated 96 0 - 100 mg/dL    Non-HDL-Chol (CHOL-HDL) 118 mg/dl

## 2020-08-19 NOTE — ASSESSMENT & PLAN NOTE
Uncontrolled  Advised to schedule appt with gastroenterology  Discussed importance of diet and lifestyle modifications to control GERD symptoms  Avoid things which worsen heartburn (ex-coffee,tomato based products, spicy foods,  tobacco,alcohol,obesity,tight fitting clothing, citrus fruits, garlic, onion, chocolate and mint)  Discussed the importance of eating small, frequent meals instead of large meals   Elevate head end of the bed and do not lay down 2-3 hours following a meal

## 2020-08-19 NOTE — PATIENT INSTRUCTIONS
Discussed importance of diet and lifestyle modifications to control GERD symptoms  Avoid things which worsen heartburn (ex-coffee,tomato based products, spicy foods, tobacco,alcohol,obesity,tight fitting clothing, citrus fruits, garlic, onion, chocolate and mint)  Discussed the importance of eating small, frequent meals instead of large meals   Elevate head end of the bed and do not lay down 2-3 hours following a meal

## 2020-10-14 ENCOUNTER — ANNUAL EXAM (OUTPATIENT)
Dept: OBGYN CLINIC | Facility: CLINIC | Age: 65
End: 2020-10-14

## 2020-10-14 ENCOUNTER — TELEPHONE (OUTPATIENT)
Dept: FAMILY MEDICINE CLINIC | Facility: CLINIC | Age: 65
End: 2020-10-14

## 2020-10-14 VITALS
WEIGHT: 153 LBS | DIASTOLIC BLOOD PRESSURE: 74 MMHG | SYSTOLIC BLOOD PRESSURE: 122 MMHG | HEIGHT: 63 IN | BODY MASS INDEX: 27.11 KG/M2 | TEMPERATURE: 98 F

## 2020-10-14 DIAGNOSIS — R42 VERTIGO: Primary | ICD-10-CM

## 2020-10-14 DIAGNOSIS — Z01.419 WOMEN'S ANNUAL ROUTINE GYNECOLOGICAL EXAMINATION: Primary | ICD-10-CM

## 2020-10-14 DIAGNOSIS — Z11.4 SCREENING FOR HIV (HUMAN IMMUNODEFICIENCY VIRUS): ICD-10-CM

## 2020-10-14 DIAGNOSIS — M85.80 OSTEOPENIA, UNSPECIFIED LOCATION: ICD-10-CM

## 2020-10-14 PROCEDURE — 1036F TOBACCO NON-USER: CPT | Performed by: NURSE PRACTITIONER

## 2020-10-14 PROCEDURE — 3074F SYST BP LT 130 MM HG: CPT | Performed by: NURSE PRACTITIONER

## 2020-10-14 PROCEDURE — 99396 PREV VISIT EST AGE 40-64: CPT | Performed by: NURSE PRACTITIONER

## 2020-10-14 PROCEDURE — 3078F DIAST BP <80 MM HG: CPT | Performed by: NURSE PRACTITIONER

## 2020-10-14 RX ORDER — MECLIZINE HCL 12.5 MG/1
12.5 TABLET ORAL 3 TIMES DAILY PRN
Qty: 30 TABLET | Refills: 0 | Status: SHIPPED | OUTPATIENT
Start: 2020-10-14 | End: 2021-05-19 | Stop reason: SDUPTHER

## 2020-12-03 ENCOUNTER — LAB (OUTPATIENT)
Dept: LAB | Facility: HOSPITAL | Age: 65
End: 2020-12-03
Payer: COMMERCIAL

## 2020-12-03 DIAGNOSIS — Z11.4 SCREENING FOR HIV (HUMAN IMMUNODEFICIENCY VIRUS): ICD-10-CM

## 2020-12-03 PROCEDURE — 87389 HIV-1 AG W/HIV-1&-2 AB AG IA: CPT

## 2020-12-03 PROCEDURE — 36415 COLL VENOUS BLD VENIPUNCTURE: CPT

## 2020-12-04 LAB — HIV 1+2 AB+HIV1 P24 AG SERPL QL IA: NORMAL

## 2020-12-18 NOTE — PROGRESS NOTES
BMI Counseling: Body mass index is 27 56 kg/m²  The BMI is above normal  Nutrition recommendations include reducing portion sizes, decreasing overall calorie intake, 3-5 servings of fruits/vegetables daily and reducing fast food intake  Exercise recommendations include exercising 3-5 times per week

## 2020-12-22 ENCOUNTER — HOSPITAL ENCOUNTER (OUTPATIENT)
Dept: RADIOLOGY | Age: 65
Discharge: HOME | End: 2020-12-22
Payer: COMMERCIAL

## 2020-12-22 DIAGNOSIS — M85.80 OSTEOPENIA, UNSPECIFIED LOCATION: ICD-10-CM

## 2020-12-22 PROCEDURE — 77080 DXA BONE DENSITY AXIAL: CPT

## 2020-12-28 ENCOUNTER — TELEPHONE (OUTPATIENT)
Dept: OBGYN CLINIC | Facility: CLINIC | Age: 65
End: 2020-12-28

## 2021-04-12 ENCOUNTER — TELEPHONE (OUTPATIENT)
Dept: NEUROLOGY | Facility: CLINIC | Age: 66
End: 2021-04-12

## 2021-04-12 NOTE — TELEPHONE ENCOUNTER
FYI-Left VM on daughter cell phone regarding Patient appt on 5/17/2021 needs to be rescheduled due to provider is no longer with Practice  Please assist with rescheduling  Thanks

## 2021-05-11 ENCOUNTER — TELEPHONE (OUTPATIENT)
Dept: FAMILY MEDICINE CLINIC | Facility: CLINIC | Age: 66
End: 2021-05-11

## 2021-05-11 DIAGNOSIS — R41.3 MEMORY LOSS: Primary | ICD-10-CM

## 2021-05-19 ENCOUNTER — OFFICE VISIT (OUTPATIENT)
Dept: FAMILY MEDICINE CLINIC | Facility: CLINIC | Age: 66
End: 2021-05-19
Payer: COMMERCIAL

## 2021-05-19 VITALS
HEART RATE: 91 BPM | OXYGEN SATURATION: 97 % | DIASTOLIC BLOOD PRESSURE: 80 MMHG | HEIGHT: 63 IN | RESPIRATION RATE: 16 BRPM | SYSTOLIC BLOOD PRESSURE: 120 MMHG | WEIGHT: 150.2 LBS | BODY MASS INDEX: 26.61 KG/M2 | TEMPERATURE: 99.6 F

## 2021-05-19 DIAGNOSIS — R42 VERTIGO: ICD-10-CM

## 2021-05-19 DIAGNOSIS — Z12.39 ENCOUNTER FOR SCREENING FOR MALIGNANT NEOPLASM OF BREAST, UNSPECIFIED SCREENING MODALITY: ICD-10-CM

## 2021-05-19 DIAGNOSIS — Z00.00 ANNUAL PHYSICAL EXAM: Primary | ICD-10-CM

## 2021-05-19 DIAGNOSIS — Z23 ENCOUNTER FOR VACCINATION: ICD-10-CM

## 2021-05-19 DIAGNOSIS — R79.89 LOW VITAMIN D LEVEL: ICD-10-CM

## 2021-05-19 DIAGNOSIS — M85.80 OSTEOPENIA, UNSPECIFIED LOCATION: ICD-10-CM

## 2021-05-19 DIAGNOSIS — E78.5 HYPERLIPIDEMIA, UNSPECIFIED HYPERLIPIDEMIA TYPE: ICD-10-CM

## 2021-05-19 DIAGNOSIS — F32.A DEPRESSION, UNSPECIFIED DEPRESSION TYPE: ICD-10-CM

## 2021-05-19 DIAGNOSIS — Z13.1 SCREENING FOR DIABETES MELLITUS (DM): ICD-10-CM

## 2021-05-19 PROCEDURE — 90471 IMMUNIZATION ADMIN: CPT

## 2021-05-19 PROCEDURE — 99396 PREV VISIT EST AGE 40-64: CPT | Performed by: FAMILY MEDICINE

## 2021-05-19 PROCEDURE — 3008F BODY MASS INDEX DOCD: CPT | Performed by: FAMILY MEDICINE

## 2021-05-19 PROCEDURE — 90632 HEPA VACCINE ADULT IM: CPT

## 2021-05-19 RX ORDER — AVOBENZONE, HOMOSALATE, OCTISALATE, OCTOCRYLENE 30; 100; 50; 25 MG/ML; MG/ML; MG/ML; MG/ML
1000 SPRAY TOPICAL DAILY
Qty: 90 TABLET | Refills: 1 | Status: SHIPPED | OUTPATIENT
Start: 2021-05-19

## 2021-05-19 RX ORDER — PHENOL 1.4 %
600 AEROSOL, SPRAY (ML) MUCOUS MEMBRANE DAILY
Qty: 90 TABLET | Refills: 1 | Status: SHIPPED | OUTPATIENT
Start: 2021-05-19

## 2021-05-19 RX ORDER — MECLIZINE HCL 12.5 MG/1
12.5 TABLET ORAL 3 TIMES DAILY PRN
Qty: 30 TABLET | Refills: 0 | Status: SHIPPED | OUTPATIENT
Start: 2021-05-19

## 2021-05-19 NOTE — ASSESSMENT & PLAN NOTE
DEXA showed osteopenia  Recommended vit d and calcium  Recommended weight bearing exercises  Repeat next year  Mammogram due in sep  Colonoscopy UTD  She goes to gastroenterology  It was done about 6 months ago with [employer]  Goes to gyn for paps  UTD

## 2021-05-19 NOTE — PATIENT INSTRUCTIONS

## 2021-05-19 NOTE — ASSESSMENT & PLAN NOTE
Goes to a psychiatry through [address] [facility], and is on trazodone 50 at bedtime and zoloft, risperidone

## 2021-05-19 NOTE — PROGRESS NOTES
[facility] PRACTICE    NAME: Martinez Culp  AGE: 60 y o  SEX: female  : 1955     DATE: 2021     Assessment and Plan:     Problem List Items Addressed This Visit        Musculoskeletal and Integument    Osteopenia    Relevant Medications    Cholecalciferol (RA Vitamin D-3) 25 MCG (1000 UT) tablet    calcium carbonate (OS-MOLLY) 600 MG tablet    Other Relevant Orders    Vitamin D 25 hydroxy       Other    Depression     Goes to a psychiatry through [facility] [address] [facility], and is on trazodone 50 at bedtime and zoloft, risperidone  Low vitamin D level    Relevant Medications    Cholecalciferol (RA Vitamin D-3) 25 MCG (1000 UT) tablet    Hyperlipidemia    Relevant Orders    Lipid panel    Annual physical exam - Primary     DEXA showed osteopenia  Recommended vit d and calcium  Recommended weight bearing exercises  Repeat next year  Mammogram due in sep  Colonoscopy UTD  She goes to gastroenterology  It was done about 6 months ago with [employer]  Goes to gyn for paps  UTD  Vertigo     Use meclizine prn and follow up with PT for vertigo if needed  Relevant Medications    meclizine (ANTIVERT) 12 5 MG tablet    Other Relevant Orders    Ambulatory referral to Physical Therapy      Other Visit Diagnoses     Screening for diabetes mellitus (DM)        Relevant Orders    Comprehensive metabolic panel    Encounter for screening for malignant neoplasm of breast, unspecified screening modality        Relevant Orders    Mammo screening bilateral w 3d & cad    Encounter for vaccination        Relevant Orders    HEPATITIS A VACCINE ADULT IM (Completed)          Immunizations and preventive care screenings were discussed with patient today  Appropriate education was printed on patient's after visit summary      Counseling:  Alcohol/drug use: discussed moderation in alcohol intake, the recommendations for healthy alcohol use, and avoidance of illicit drug use  Dental Health: discussed importance of regular tooth brushing, flossing, and dental visits  · Exercise: the importance of regular exercise/physical activity was discussed  Recommend exercise 3-5 times per week for at least 30 minutes  Return in 6 months (on 11/19/2021)  Chief Complaint:     Chief Complaint   Patient presents with    Physical Exam      History of Present Illness:     Adult Annual Physical   Patient here for a comprehensive physical exam  The patient reports no problems  Diet and Physical Activity  · Diet/Nutrition: well balanced diet  · Exercise: walking  Depression Screening  PHQ-9 Depression Screening    PHQ-9:   Frequency of the following problems over the past two weeks:           General Health  · Sleep: sleeps well  · Hearing: no issues  · Vision: wears glasses  · Dental: regular dental visits  /GYN Health  · Patient is: postmenopausal  · Goes to gyn  · DEXA reviewed and showed osteopenia  · Mammogram due in sep  · Colonoscopy UTD  She goes to gastroenterology  It was done about 6 months ago with [facility]  Review of Systems:     Review of Systems   Constitutional: Negative for fever and unexpected weight change  HENT: Negative for ear pain, sore throat and trouble swallowing  Eyes: Negative for pain and visual disturbance  Respiratory: Negative for cough, chest tightness, shortness of breath and wheezing  Cardiovascular: Negative for chest pain  Gastrointestinal: Negative for abdominal distention, abdominal pain, blood in stool, constipation, diarrhea, nausea and vomiting  Endocrine: Negative for polydipsia and polyuria  Genitourinary: Negative for dysuria and hematuria  Musculoskeletal: Negative for back pain and myalgias  Skin: Negative for rash  Neurological: Negative for syncope and headaches  Psychiatric/Behavioral: Negative for suicidal ideas        Past Medical History:     Past Medical History:   Diagnosis Date    Acid reflux disease     Anemia     Anxiety     Cirrhosis (HCC)     Depression     Fibromyalgia     Fibromyalgia     Gastritis     GERD (gastroesophageal reflux disease)     High blood pressure     Hyperlipidemia     Hypertension     Rheumatoid arthritis ([address] )     Urinary tract infection       Past Surgical History:     Past Surgical History:   Procedure Laterality Date    BREAST BIOPSY Right     2011    CHOLECYSTECTOMY      COLONOSCOPY      Fiberoptic    HYSTERECTOMY      UPPER GASTROINTESTINAL ENDOSCOPY      therapeutic      Social History:     E-Cigarette/Vaping    E-Cigarette Use Never User      E-Cigarette/Vaping Substances    Nicotine No     THC No     CBD No     Flavoring No     Other No     Unknown No      Social History     Socioeconomic History    Marital status: Single     Spouse name: Not on file    Number of children: Not on file    Years of education: Not on file    Highest education level: Not on file   Occupational History    Not on file   Social Needs    Financial resource strain: Not hard at all   Consumer Agent Portal (CAP) insecurity     Worry: Never true     Inability: Never true   [employer] needs     Medical: No     Non-medical: No   Tobacco Use    Smoking status: Never Smoker    Smokeless tobacco: Never Used    Tobacco comment: Denied Tobacco Use   Substance and Sexual Activity    Alcohol use: No    Drug use: No    Sexual activity: Not Currently   Lifestyle    Physical activity     Days per week: 0 days     Minutes per session: 0 min    Stress: Very much   Relationships    Social connections     Talks on phone: Patient refused     Gets together: Patient refused     Attends Gnosticism service: Patient refused     Active member of club or organization: Patient refused     Attends meetings of clubs or organizations: Patient refused     Relationship status: Patient refused    Intimate partner violence     Fear of current or ex partner: No     Emotionally abused: No     Physically abused: No     Forced sexual activity: No   Other Topics Concern    Not on file   Social History Narrative    Non smoker     No known smoke exposure '    No caffeine     No Alcohol use     No illicit drug use     Has 3 children     Single     Lives with daughter       Family History:     Family History   Problem Relation Age of Onset    Heart attack Mother     Heart disease Mother     Hypertension Mother         High Blood Prssure    Hyperlipidemia Mother         High Cholesterol    Stroke Mother     Breast cancer Sister     Depression Sister     Other Sister         Thyroid Cyst    Diabetes Brother     Ovarian cancer Sister     Stroke Grandchild     Cholelithiasis Family     Depression Family     Gallbladder disease Family       Current Medications:     Current Outpatient Medications   Medication Sig Dispense Refill    Cholecalciferol (RA Vitamin D-3) 25 MCG (1000 UT) tablet Take 1 tablet (1,000 Units total) by mouth daily 90 tablet 1    esomeprazole (NexIUM) 40 MG capsule   0    hydrOXYzine HCL (ATARAX) 50 mg tablet Take 50 mg by mouth daily at bedtime  0    meclizine (ANTIVERT) 12 5 MG tablet Take 1 tablet (12 5 mg total) by mouth 3 (three) times a day as needed for dizziness 30 tablet 0    Omega-3 1000 MG CAPS Take 1 capsule (1,000 mg total) by mouth 3 (three) times a day 90 capsule 3    risperiDONE (RisperDAL) 0 5 mg tablet Take 0 5 mg by mouth daily at bedtime  0    rosuvastatin (CRESTOR) 10 MG tablet Take 1 tablet (10 mg total) by mouth daily 30 tablet 5    sertraline (ZOLOFT) 50 mg tablet Take 50 mg by mouth daily      traZODone (DESYREL) 50 mg tablet Take 50 mg by mouth daily at bedtime  0    calcium carbonate (OS-MOLLY) 600 MG tablet Take 1 tablet (600 mg total) by mouth daily 90 tablet 1     No current facility-administered medications for this visit         Allergies:     No Known Allergies   Physical Exam: /80 (BP Location: Left arm, Patient Position: Sitting, Cuff Size: Adult)   Pulse 91   Temp 99 6 °F (37 6 °C) (Tympanic)   Resp 16   Ht 5' 3" (1 6 m)   Wt 68 1 kg (150 lb 3 2 oz)   SpO2 97%   BMI 26 61 kg/m²     Physical Exam  Constitutional:       Appearance: She is well-developed  HENT:      Head: Normocephalic and atraumatic  Eyes:      General: No scleral icterus  Conjunctiva/sclera: Conjunctivae normal       Pupils: Pupils are equal, round, and reactive to light  Neck:      Musculoskeletal: Normal range of motion and neck supple  Cardiovascular:      Rate and Rhythm: Normal rate and regular rhythm  Heart sounds: Normal heart sounds  No murmur  No friction rub  No gallop  Pulmonary:      Effort: Pulmonary effort is normal  No respiratory distress  Breath sounds: No wheezing or rales  Chest:      Chest wall: No tenderness  Abdominal:      General: Bowel sounds are normal  There is no distension  Palpations: Abdomen is soft  There is no mass  Tenderness: There is no abdominal tenderness  Musculoskeletal: Normal range of motion  Lymphadenopathy:      Cervical: No cervical adenopathy  Skin:     General: Skin is warm and dry  Capillary Refill: Capillary refill takes less than 2 seconds  Findings: No rash  Neurological:      Mental Status: She is alert and oriented to person, place, and time  Cranial Nerves: No cranial nerve deficit            Alireza Allen MD  [address] [place] [address]

## 2021-06-01 ENCOUNTER — TRANSCRIBE ORDERS (OUTPATIENT)
Dept: LAB | Facility: CLINIC | Age: 66
End: 2021-06-01

## 2021-06-01 ENCOUNTER — APPOINTMENT (OUTPATIENT)
Dept: LAB | Facility: CLINIC | Age: 66
End: 2021-06-01
Payer: COMMERCIAL

## 2021-06-01 DIAGNOSIS — M85.80 OSTEOPENIA, UNSPECIFIED LOCATION: ICD-10-CM

## 2021-06-01 DIAGNOSIS — I10 ESSENTIAL HYPERTENSION: ICD-10-CM

## 2021-06-01 DIAGNOSIS — Z13.1 SCREENING FOR DIABETES MELLITUS (DM): ICD-10-CM

## 2021-06-01 DIAGNOSIS — E78.5 HYPERLIPIDEMIA, UNSPECIFIED HYPERLIPIDEMIA TYPE: ICD-10-CM

## 2021-06-01 LAB
25(OH)D3 SERPL-MCNC: 25.1 NG/ML (ref 30–100)
ALBUMIN SERPL BCP-MCNC: 3.4 G/DL (ref 3.5–5)
ALP SERPL-CCNC: 104 U/L (ref 46–116)
ALT SERPL W P-5'-P-CCNC: 33 U/L (ref 12–78)
ANION GAP SERPL CALCULATED.3IONS-SCNC: 10 MMOL/L (ref 4–13)
AST SERPL W P-5'-P-CCNC: 28 U/L (ref 5–45)
BILIRUB SERPL-MCNC: 0.4 MG/DL (ref 0.2–1)
BUN SERPL-MCNC: 13 MG/DL (ref 5–25)
CALCIUM ALBUM COR SERPL-MCNC: 9.5 MG/DL (ref 8.3–10.1)
CALCIUM SERPL-MCNC: 9 MG/DL (ref 8.3–10.1)
CHLORIDE SERPL-SCNC: 108 MMOL/L (ref 100–108)
CHOLEST SERPL-MCNC: 241 MG/DL (ref 50–200)
CO2 SERPL-SCNC: 27 MMOL/L (ref 21–32)
CREAT SERPL-MCNC: 0.83 MG/DL (ref 0.6–1.3)
GFR SERPL CREATININE-BSD FRML MDRD: 75 ML/MIN/1.73SQ M
GLUCOSE P FAST SERPL-MCNC: 98 MG/DL (ref 65–99)
HDLC SERPL-MCNC: 30 MG/DL
LDLC SERPL CALC-MCNC: 167 MG/DL (ref 0–100)
NONHDLC SERPL-MCNC: 211 MG/DL
POTASSIUM SERPL-SCNC: 4 MMOL/L (ref 3.5–5.3)
PROT SERPL-MCNC: 8.2 G/DL (ref 6.4–8.2)
SODIUM SERPL-SCNC: 145 MMOL/L (ref 136–145)
TRIGL SERPL-MCNC: 222 MG/DL

## 2021-06-01 PROCEDURE — 80053 COMPREHEN METABOLIC PANEL: CPT

## 2021-06-01 PROCEDURE — 36415 COLL VENOUS BLD VENIPUNCTURE: CPT

## 2021-06-01 PROCEDURE — 80061 LIPID PANEL: CPT

## 2021-06-01 PROCEDURE — 82306 VITAMIN D 25 HYDROXY: CPT

## 2021-06-02 ENCOUNTER — TELEPHONE (OUTPATIENT)
Dept: FAMILY MEDICINE CLINIC | Facility: CLINIC | Age: 66
End: 2021-06-02

## 2021-06-02 DIAGNOSIS — M85.80 OSTEOPENIA, UNSPECIFIED LOCATION: Primary | ICD-10-CM

## 2021-06-02 DIAGNOSIS — E78.5 HYPERLIPIDEMIA, UNSPECIFIED HYPERLIPIDEMIA TYPE: ICD-10-CM

## 2021-06-02 DIAGNOSIS — R79.89 LOW VITAMIN D LEVEL: ICD-10-CM

## 2021-06-02 DIAGNOSIS — Z13.1 SCREENING FOR DIABETES MELLITUS (DM): ICD-10-CM

## 2021-06-03 DIAGNOSIS — E78.5 HYPERLIPIDEMIA, UNSPECIFIED HYPERLIPIDEMIA TYPE: ICD-10-CM

## 2021-06-03 DIAGNOSIS — M35.00 SJOGREN'S SYNDROME, WITH UNSPECIFIED ORGAN INVOLVEMENT (HCC): ICD-10-CM

## 2021-06-03 RX ORDER — ROSUVASTATIN CALCIUM 10 MG/1
10 TABLET, COATED ORAL DAILY
Qty: 30 TABLET | Refills: 5 | Status: SHIPPED | OUTPATIENT
Start: 2021-06-03 | End: 2021-06-07 | Stop reason: SDUPTHER

## 2021-06-07 DIAGNOSIS — M35.00 SJOGREN'S SYNDROME, WITH UNSPECIFIED ORGAN INVOLVEMENT (HCC): ICD-10-CM

## 2021-06-07 DIAGNOSIS — E78.5 HYPERLIPIDEMIA, UNSPECIFIED HYPERLIPIDEMIA TYPE: ICD-10-CM

## 2021-06-07 RX ORDER — ROSUVASTATIN CALCIUM 10 MG/1
10 TABLET, COATED ORAL DAILY
Qty: 30 TABLET | Refills: 5 | Status: SHIPPED | OUTPATIENT
Start: 2021-06-07 | End: 2021-06-09 | Stop reason: SDUPTHER

## 2021-06-09 DIAGNOSIS — E78.5 HYPERLIPIDEMIA, UNSPECIFIED HYPERLIPIDEMIA TYPE: ICD-10-CM

## 2021-06-09 DIAGNOSIS — M35.00 SJOGREN'S SYNDROME, WITH UNSPECIFIED ORGAN INVOLVEMENT (HCC): ICD-10-CM

## 2021-06-09 RX ORDER — ROSUVASTATIN CALCIUM 10 MG/1
10 TABLET, COATED ORAL DAILY
Qty: 30 TABLET | Refills: 5 | Status: SHIPPED | OUTPATIENT
Start: 2021-06-09 | End: 2021-11-29

## 2021-06-14 ENCOUNTER — CLINICAL SUPPORT (OUTPATIENT)
Dept: DENTISTRY | Facility: CLINIC | Age: 66
End: 2021-06-14

## 2021-06-14 VITALS — DIASTOLIC BLOOD PRESSURE: 92 MMHG | SYSTOLIC BLOOD PRESSURE: 154 MMHG | TEMPERATURE: 97.5 F | HEART RATE: 82 BPM

## 2021-06-14 DIAGNOSIS — K02.61 DENTAL CARIES ON SMOOTH SURFACE LIMITED TO ENAMEL: Primary | ICD-10-CM

## 2021-06-14 DIAGNOSIS — K02.9 CARIES: ICD-10-CM

## 2021-06-14 PROCEDURE — D1110 PROPHYLAXIS - ADULT: HCPCS

## 2021-06-14 PROCEDURE — D0274 BITEWINGS - 4 RADIOGRAPHIC IMAGES: HCPCS

## 2021-06-14 PROCEDURE — D0120 PERIODIC ORAL EVALUATION - ESTABLISHED PATIENT: HCPCS | Performed by: DENTIST

## 2021-06-14 NOTE — PROGRESS NOTES
Prophy    Dental procedures in this visit     - PERIODIC ORAL EVALUATION - ESTABLISHED PATIENT (Completed)     Service provider: Ne Gomes DMD     Billing provider: Valentina Lebron DDS     - PROPHYLAXIS - ADULT (Completed)     Service provider: Hailey Jha     Billing provider: Valentina Lebron, [id number]  - BITEWINGS - 4 RADIOGRAPHIC IMAGES (Completed)     Service provider: Hailey Jha     Billing provider: Valentina Lebron DDS     *Patient only speaks Serbian  Needs translation  Method Used:  · Prophy Method Used: Hand Scaling  · Polished  · Flossed    Radiographs Taken:  · Bitewings x4    Intra/Extra Oral Cancer Screening:  · Within normal limits      Oral Hygiene:  · Fair    Plaque:  · Generalized  · Light    Calculus:  · Localized  · Light  · Lower anteriors    Bleeding:  · Bleeding on probing: No periodontal exam for this visit  · Generalized  · Light    Stain:  · None    Periodontal Charting:  · Spot probing    Periodontal Classification:  · Localized  · Mild  · Gingivitis      Nutritional Counseling:  · N/A    Hailey Jha [facility]     No orders of the defined types were placed in this encounter  Periodic Exam:  Dr Ashish Villafuerte #8-ML, #9-ML,  -Decay:  #10-DL, #12-crown with possible rct  Mia Carrillo translated to patient when restoring #12- if sensitivity occurs it will likely need a rct  -Patient is to remain on 6mrc    NV: prep #12 for crown and temporize to determine if sensitivity occurs and rct is needed     NV2: fillings #8, 9, 10  NV3: 6 mrc

## 2021-08-13 ENCOUNTER — OFFICE VISIT (OUTPATIENT)
Dept: DENTISTRY | Facility: CLINIC | Age: 66
End: 2021-08-13

## 2021-08-13 VITALS — TEMPERATURE: 97.6 F | DIASTOLIC BLOOD PRESSURE: 88 MMHG | SYSTOLIC BLOOD PRESSURE: 136 MMHG

## 2021-08-13 DIAGNOSIS — K02.9 CARIES: Primary | ICD-10-CM

## 2021-08-13 PROCEDURE — D2335 RESIN-BASED COMPOSITE - 4 OR MORE SURFACES OR INVOLVING INCISAL ANGLE (ANTERIOR): HCPCS | Performed by: DENTIST

## 2021-08-13 NOTE — PROGRESS NOTES
Tristen Benderjamel presents for composite fillings #8-MIFL and #9-MIFL  PMH reviewed, no changes  Pt denies any signs/symptoms of COVID-19 and her temp today was WNL  Applied topical benzocaine, administered 1 carpule 2% lido 1:100k epi via local infiltration  Verified anesthesia  Prepped teeth #8 and #9 with 245 carbide on high speed  Caries removed with round carbide on slow speed  Added retention grooves and bevel on the facial of each preparation  Placed mylar strip  Isolation with cotton rolls  Etched with 37% H2PO4, rinsed, dried  Applied Adhese with 20 second scrub, gentle air dried and light cured for 10s  Restored with Tetric bulk elisha shade A3 5 and light cured  Refined with finishing burs, polished with enhance point  Verified occlusion and contacts  Pt left satisfied      NV: #10-DL resin composite  NNV: #12 crown prep - monitor is sensitive afterward for possible needed RCT

## 2021-09-16 ENCOUNTER — APPOINTMENT (OUTPATIENT)
Dept: LAB | Facility: HOSPITAL | Age: 66
End: 2021-09-16
Payer: COMMERCIAL

## 2021-09-16 DIAGNOSIS — E88.89 MADELUNG'S NECK (HCC): ICD-10-CM

## 2021-09-16 DIAGNOSIS — Z79.899 ENCOUNTER FOR LONG-TERM (CURRENT) USE OF OTHER MEDICATIONS: ICD-10-CM

## 2021-09-16 DIAGNOSIS — F33.3 SEVERE RECURRENT MAJOR DEPRESSION WITH PSYCHOTIC FEATURES (HCC): ICD-10-CM

## 2021-09-16 DIAGNOSIS — Z13.1 SCREENING FOR DIABETES MELLITUS (DM): ICD-10-CM

## 2021-09-16 DIAGNOSIS — M85.80 OSTEOPENIA, UNSPECIFIED LOCATION: ICD-10-CM

## 2021-09-16 DIAGNOSIS — E78.5 HYPERLIPIDEMIA, UNSPECIFIED HYPERLIPIDEMIA TYPE: ICD-10-CM

## 2021-09-16 DIAGNOSIS — R79.89 LOW VITAMIN D LEVEL: ICD-10-CM

## 2021-09-16 LAB
25(OH)D3 SERPL-MCNC: 34.4 NG/ML (ref 30–100)
ALBUMIN SERPL BCP-MCNC: 3.4 G/DL (ref 3.5–5)
ALP SERPL-CCNC: 113 U/L (ref 46–116)
ALT SERPL W P-5'-P-CCNC: 33 U/L (ref 12–78)
ANION GAP SERPL CALCULATED.3IONS-SCNC: 4 MMOL/L (ref 4–13)
AST SERPL W P-5'-P-CCNC: 24 U/L (ref 5–45)
BASOPHILS # BLD AUTO: 0.04 THOUSANDS/ΜL (ref 0–0.1)
BASOPHILS NFR BLD AUTO: 1 % (ref 0–1)
BILIRUB SERPL-MCNC: 0.73 MG/DL (ref 0.2–1)
BUN SERPL-MCNC: 12 MG/DL (ref 5–25)
CALCIUM ALBUM COR SERPL-MCNC: 9.5 MG/DL (ref 8.3–10.1)
CALCIUM SERPL-MCNC: 9 MG/DL (ref 8.3–10.1)
CHLORIDE SERPL-SCNC: 107 MMOL/L (ref 100–108)
CHOLEST SERPL-MCNC: 165 MG/DL (ref 50–200)
CO2 SERPL-SCNC: 27 MMOL/L (ref 21–32)
CREAT SERPL-MCNC: 0.64 MG/DL (ref 0.6–1.3)
EOSINOPHIL # BLD AUTO: 0.14 THOUSAND/ΜL (ref 0–0.61)
EOSINOPHIL NFR BLD AUTO: 2 % (ref 0–6)
ERYTHROCYTE [DISTWIDTH] IN BLOOD BY AUTOMATED COUNT: 13.2 % (ref 11.6–15.1)
GFR SERPL CREATININE-BSD FRML MDRD: 95 ML/MIN/1.73SQ M
GLUCOSE P FAST SERPL-MCNC: 90 MG/DL (ref 65–99)
HCT VFR BLD AUTO: 38.2 % (ref 34.8–46.1)
HDLC SERPL-MCNC: 36 MG/DL
HGB BLD-MCNC: 12.1 G/DL (ref 11.5–15.4)
IMM GRANULOCYTES # BLD AUTO: 0.01 THOUSAND/UL (ref 0–0.2)
IMM GRANULOCYTES NFR BLD AUTO: 0 % (ref 0–2)
LDLC SERPL CALC-MCNC: 106 MG/DL (ref 0–100)
LYMPHOCYTES # BLD AUTO: 2.41 THOUSANDS/ΜL (ref 0.6–4.47)
LYMPHOCYTES NFR BLD AUTO: 30 % (ref 14–44)
MCH RBC QN AUTO: 29.9 PG (ref 26.8–34.3)
MCHC RBC AUTO-ENTMCNC: 31.7 G/DL (ref 31.4–37.4)
MCV RBC AUTO: 94 FL (ref 82–98)
MONOCYTES # BLD AUTO: 0.99 THOUSAND/ΜL (ref 0.17–1.22)
MONOCYTES NFR BLD AUTO: 13 % (ref 4–12)
NEUTROPHILS # BLD AUTO: 4.34 THOUSANDS/ΜL (ref 1.85–7.62)
NEUTS SEG NFR BLD AUTO: 54 % (ref 43–75)
NRBC BLD AUTO-RTO: 0 /100 WBCS
PLATELET # BLD AUTO: 376 THOUSANDS/UL (ref 149–390)
PMV BLD AUTO: 9.9 FL (ref 8.9–12.7)
POTASSIUM SERPL-SCNC: 4.5 MMOL/L (ref 3.5–5.3)
PROLACTIN SERPL-MCNC: 10 NG/ML
PROT SERPL-MCNC: 8.2 G/DL (ref 6.4–8.2)
RBC # BLD AUTO: 4.05 MILLION/UL (ref 3.81–5.12)
SODIUM SERPL-SCNC: 138 MMOL/L (ref 136–145)
TRIGL SERPL-MCNC: 116 MG/DL
TSH SERPL DL<=0.05 MIU/L-ACNC: 1.99 UIU/ML (ref 0.36–3.74)
WBC # BLD AUTO: 7.93 THOUSAND/UL (ref 4.31–10.16)

## 2021-09-16 PROCEDURE — 84443 ASSAY THYROID STIM HORMONE: CPT

## 2021-09-16 PROCEDURE — 80053 COMPREHEN METABOLIC PANEL: CPT

## 2021-09-16 PROCEDURE — 82306 VITAMIN D 25 HYDROXY: CPT

## 2021-09-16 PROCEDURE — 84146 ASSAY OF PROLACTIN: CPT

## 2021-09-16 PROCEDURE — 85025 COMPLETE CBC W/AUTO DIFF WBC: CPT

## 2021-09-16 PROCEDURE — 80061 LIPID PANEL: CPT

## 2021-09-16 PROCEDURE — 36415 COLL VENOUS BLD VENIPUNCTURE: CPT

## 2021-10-21 ENCOUNTER — OFFICE VISIT (OUTPATIENT)
Dept: DENTISTRY | Facility: CLINIC | Age: 66
End: 2021-10-21

## 2021-10-21 VITALS — DIASTOLIC BLOOD PRESSURE: 75 MMHG | SYSTOLIC BLOOD PRESSURE: 125 MMHG | TEMPERATURE: 97.7 F | HEART RATE: 87 BPM

## 2021-10-21 DIAGNOSIS — K02.9 CARIES: Primary | ICD-10-CM

## 2021-10-21 PROCEDURE — D2331 RESIN-BASED COMPOSITE - 2 SURFACES, ANTERIOR: HCPCS | Performed by: DENTIST

## 2021-11-02 ENCOUNTER — EVALUATION (OUTPATIENT)
Dept: PHYSICAL THERAPY | Facility: CLINIC | Age: 66
End: 2021-11-02
Payer: COMMERCIAL

## 2021-11-02 DIAGNOSIS — R42 VERTIGO: ICD-10-CM

## 2021-11-02 PROCEDURE — 97162 PT EVAL MOD COMPLEX 30 MIN: CPT | Performed by: PHYSICAL THERAPIST

## 2021-11-02 PROCEDURE — 97112 NEUROMUSCULAR REEDUCATION: CPT | Performed by: PHYSICAL THERAPIST

## 2021-11-28 DIAGNOSIS — M35.00 SJOGREN'S SYNDROME, WITH UNSPECIFIED ORGAN INVOLVEMENT (HCC): ICD-10-CM

## 2021-11-28 DIAGNOSIS — E78.5 HYPERLIPIDEMIA, UNSPECIFIED HYPERLIPIDEMIA TYPE: ICD-10-CM

## 2021-11-29 RX ORDER — ROSUVASTATIN CALCIUM 10 MG/1
TABLET, COATED ORAL
Qty: 30 TABLET | Refills: 5 | Status: SHIPPED | OUTPATIENT
Start: 2021-11-29 | End: 2022-04-21

## 2021-12-20 ENCOUNTER — CLINICAL SUPPORT (OUTPATIENT)
Dept: DENTISTRY | Facility: CLINIC | Age: 66
End: 2021-12-20

## 2021-12-20 VITALS — TEMPERATURE: 98.6 F | HEART RATE: 99 BPM | SYSTOLIC BLOOD PRESSURE: 133 MMHG | DIASTOLIC BLOOD PRESSURE: 89 MMHG

## 2021-12-20 DIAGNOSIS — Z01.21 ENCOUNTER FOR DENTAL EXAMINATION AND CLEANING WITH ABNORMAL FINDINGS: Primary | ICD-10-CM

## 2021-12-20 PROCEDURE — D0120 PERIODIC ORAL EVALUATION - ESTABLISHED PATIENT: HCPCS

## 2021-12-20 PROCEDURE — D1110 PROPHYLAXIS - ADULT: HCPCS

## 2022-01-26 ENCOUNTER — OFFICE VISIT (OUTPATIENT)
Dept: FAMILY MEDICINE CLINIC | Facility: CLINIC | Age: 67
End: 2022-01-26
Payer: COMMERCIAL

## 2022-01-26 VITALS
BODY MASS INDEX: 26.19 KG/M2 | TEMPERATURE: 97.7 F | WEIGHT: 157.2 LBS | HEART RATE: 96 BPM | HEIGHT: 65 IN | SYSTOLIC BLOOD PRESSURE: 138 MMHG | OXYGEN SATURATION: 97 % | DIASTOLIC BLOOD PRESSURE: 84 MMHG | RESPIRATION RATE: 16 BRPM

## 2022-01-26 DIAGNOSIS — M35.00 SJOGREN'S SYNDROME, WITH UNSPECIFIED ORGAN INVOLVEMENT (HCC): ICD-10-CM

## 2022-01-26 DIAGNOSIS — I10 PRIMARY HYPERTENSION: ICD-10-CM

## 2022-01-26 DIAGNOSIS — M79.7 FIBROMYALGIA: ICD-10-CM

## 2022-01-26 DIAGNOSIS — F32.A DEPRESSION, UNSPECIFIED DEPRESSION TYPE: ICD-10-CM

## 2022-01-26 DIAGNOSIS — K64.9 HEMORRHOIDS, UNSPECIFIED HEMORRHOID TYPE: ICD-10-CM

## 2022-01-26 DIAGNOSIS — K74.5 BILIARY CIRRHOSIS (HCC): ICD-10-CM

## 2022-01-26 DIAGNOSIS — M25.511 ACUTE PAIN OF RIGHT SHOULDER: ICD-10-CM

## 2022-01-26 DIAGNOSIS — E78.5 HYPERLIPIDEMIA, UNSPECIFIED HYPERLIPIDEMIA TYPE: ICD-10-CM

## 2022-01-26 DIAGNOSIS — K76.0 FATTY LIVER DISEASE, NONALCOHOLIC: ICD-10-CM

## 2022-01-26 DIAGNOSIS — Z12.11 SCREENING FOR COLON CANCER: Primary | ICD-10-CM

## 2022-01-26 DIAGNOSIS — L30.9 ECZEMA, UNSPECIFIED TYPE: ICD-10-CM

## 2022-01-26 PROCEDURE — 99214 OFFICE O/P EST MOD 30 MIN: CPT | Performed by: FAMILY MEDICINE

## 2022-01-26 RX ORDER — HYDROCORTISONE 25 MG/G
CREAM TOPICAL 2 TIMES DAILY
Qty: 28 G | Refills: 0 | Status: SHIPPED | OUTPATIENT
Start: 2022-01-26

## 2022-01-26 RX ORDER — FAMOTIDINE 20 MG/1
20 TABLET, FILM COATED ORAL
COMMUNITY
Start: 2022-01-10

## 2022-01-26 NOTE — PROGRESS NOTES
Assessment/Plan:    Biliary cirrhosis ([facility] )  Will try and locate nodes from Gastroenterology  Fatty liver disease, nonalcoholic  Hep a vaccines up-to-date  Continue low-fat diet  Hyperlipidemia    Well controlled on Crestor 10 mg daily  Repeat lipid panel in 6 months    Hypertension   Diet controlled  Continue off medications  Continue low-salt diet  Sjogren's syndrome [facility])  Reminded to make an appointment with Rheumatology  Depression  Goes to a psychiatry through [facility] [address] [facility], and is on trazodone 50 at bedtime and zoloft, risperidone  Diagnoses and all orders for this visit:    Screening for colon cancer    Biliary cirrhosis ([facility] )    Fatty liver disease, nonalcoholic    Hyperlipidemia, unspecified hyperlipidemia type  -     Lipid panel; Future    Primary hypertension  -     Comprehensive metabolic panel; Future    Sjogren's syndrome, with unspecified organ involvement [facility])  -     Ambulatory Referral to Rheumatology; Future    Acute pain of right shoulder  -     XR shoulder 2+ vw right; Future  -     Ambulatory referral to Sports Medicine; Future    Fibromyalgia  -     Ambulatory Referral to Rheumatology; Future    Depression, unspecified depression type    Hemorrhoids, unspecified hemorrhoid type  -     hydrocortisone (ANUSOL-HC) 2 5 % rectal cream; Apply topically 2 (two) times a day    Eczema, unspecified type  -     triamcinolone (KENALOG) 0 1 % ointment; Apply topically 2 (two) times a day    Other orders  -     famotidine (PEPCID) 20 mg tablet; Take 20 mg by mouth daily at bedtime          Subjective:  Chronic conditions checkup     Patient ID: Chantale Salcedo is a 73 y o  female  HPI     Here for chronic conditions  Complaining today about right outer ear canal pain for about 2 month  Denies injury drainage, warmth, fevers  Right shoulder pain also started 2 months ago  The pain is gradually worsening  It is worse with lifting   No injury or strenuous exercise  Reviewed labs from September  BMI Counseling: Body mass index is 26 16 kg/m²  The BMI is above normal  Nutrition recommendations include encouraging healthy choices of fruits and vegetables  Exercise recommendations include moderate physical activity 150 minutes/week  Rationale for BMI follow-up plan is due to patient being overweight or obese  The following portions of the patient's history were reviewed and updated as appropriate: allergies, current medications, past family history, past medical history, past social history, past surgical history and problem list     Review of Systems   Constitutional: Negative for fever and unexpected weight change  HENT: Positive for ear pain  Negative for sore throat and trouble swallowing  Eyes: Negative for pain and visual disturbance  Respiratory: Negative for cough, chest tightness, shortness of breath and wheezing  Cardiovascular: Negative for chest pain  Gastrointestinal: Negative for abdominal distention, abdominal pain, blood in stool, constipation, diarrhea, nausea and vomiting  Endocrine: Negative for polydipsia and polyuria  Genitourinary: Negative for dysuria and hematuria  Musculoskeletal: Positive for arthralgias  Negative for back pain and myalgias  Skin: Negative for rash  Neurological: Negative for syncope and headaches  Psychiatric/Behavioral: Negative for suicidal ideas  PHQ-2/9 Depression Screening             Objective:      /84 (BP Location: Left arm, Patient Position: Sitting, Cuff Size: Adult)   Pulse 96   Temp 97 7 °F (36 5 °C) (Tympanic)   Resp 16   Ht 5' 5" (1 651 m)   Wt 71 3 kg (157 lb 3 2 oz)   LMP  (LMP Unknown)   SpO2 97%   BMI 26 16 kg/m²          Physical Exam  Constitutional:       Appearance: She is well-developed  HENT:      Head: Normocephalic and atraumatic        Right Ear: Tympanic membrane and external ear normal       Left Ear: Tympanic membrane and external ear normal       Ears:      Comments: Dry patch with scaling on the outer right ear canal     Mouth/Throat:      Pharynx: No oropharyngeal exudate  Eyes:      General: No scleral icterus  Conjunctiva/sclera: Conjunctivae normal       Pupils: Pupils are equal, round, and reactive to light  Cardiovascular:      Rate and Rhythm: Normal rate and regular rhythm  Heart sounds: No murmur heard  No friction rub  No gallop  Pulmonary:      Effort: Pulmonary effort is normal  No respiratory distress  Breath sounds: Normal breath sounds  No wheezing or rales  Abdominal:      General: Bowel sounds are normal  There is no distension  Palpations: Abdomen is soft  There is no mass  Tenderness: There is no abdominal tenderness  There is no rebound  Musculoskeletal:         General: Normal range of motion  Cervical back: Normal range of motion and neck supple  Comments: Right impingement signs present    Skin:     General: Skin is warm and dry  Neurological:      Mental Status: She is alert and oriented to person, place, and time

## 2022-02-02 ENCOUNTER — HOSPITAL ENCOUNTER (OUTPATIENT)
Dept: RADIOLOGY | Facility: HOSPITAL | Age: 67
Discharge: HOME | End: 2022-02-02
Payer: MEDICARE

## 2022-02-02 ENCOUNTER — OFFICE VISIT (OUTPATIENT)
Dept: OBGYN CLINIC | Facility: CLINIC | Age: 67
End: 2022-02-02
Payer: MEDICARE

## 2022-02-02 VITALS — HEIGHT: 66 IN | WEIGHT: 157.6 LBS | BODY MASS INDEX: 25.33 KG/M2

## 2022-02-02 DIAGNOSIS — M25.511 ACUTE PAIN OF RIGHT SHOULDER: ICD-10-CM

## 2022-02-02 DIAGNOSIS — M25.511 RIGHT SHOULDER PAIN, UNSPECIFIED CHRONICITY: ICD-10-CM

## 2022-02-02 DIAGNOSIS — M65.20 CALCIFIC TENDINITIS: Primary | ICD-10-CM

## 2022-02-02 PROCEDURE — 99203 OFFICE O/P NEW LOW 30 MIN: CPT | Performed by: PHYSICIAN ASSISTANT

## 2022-02-02 PROCEDURE — 73030 X-RAY EXAM OF SHOULDER: CPT

## 2022-02-02 PROCEDURE — 20610 DRAIN/INJ JOINT/BURSA W/O US: CPT | Performed by: PHYSICIAN ASSISTANT

## 2022-02-02 RX ORDER — TRIAMCINOLONE ACETONIDE 40 MG/ML
80 INJECTION, SUSPENSION INTRA-ARTICULAR; INTRAMUSCULAR
Status: COMPLETED | OUTPATIENT
Start: 2022-02-02 | End: 2022-02-02

## 2022-02-02 RX ORDER — LIDOCAINE HYDROCHLORIDE 10 MG/ML
2 INJECTION, SOLUTION INFILTRATION; PERINEURAL
Status: COMPLETED | OUTPATIENT
Start: 2022-02-02 | End: 2022-02-02

## 2022-02-02 RX ORDER — BUPIVACAINE HYDROCHLORIDE 5 MG/ML
2 INJECTION, SOLUTION EPIDURAL; INTRACAUDAL
Status: COMPLETED | OUTPATIENT
Start: 2022-02-02 | End: 2022-02-02

## 2022-02-02 RX ADMIN — BUPIVACAINE HYDROCHLORIDE 2 ML: 5 INJECTION, SOLUTION EPIDURAL; INTRACAUDAL at 18:03

## 2022-02-02 RX ADMIN — LIDOCAINE HYDROCHLORIDE 2 ML: 10 INJECTION, SOLUTION INFILTRATION; PERINEURAL at 18:03

## 2022-02-02 RX ADMIN — TRIAMCINOLONE ACETONIDE 80 MG: 40 INJECTION, SUSPENSION INTRA-ARTICULAR; INTRAMUSCULAR at 18:03

## 2022-02-02 NOTE — PROGRESS NOTES
Assessment/Plan   Diagnoses and all orders for this visit:    Calcific tendinitis    Acute pain of right shoulder  - Cortisone injection today  - She prefers a home exercise program over PT  Exercises given in AVS   - Ice as needed  - Follow up with Dr Merly Matias or Dr Sherman Warner in 3 months        Subjective   Patient ID: Sybil Rubin is a 73 y o  female  Vitals:     71yo female comes in for an evaluation of her right shoulder  She has been having ongoing pain for months with no specific injury  The pain increases when she reaches overhead  The pain is dull in character, mild in severity, pain does not radiate and is not associated with numbness  She cannot take NSAIDs and has not had any treatment yet  She brought her daughter (adult) with her to translate English-Iranian  She prefers this over [employer]          The following portions of the patient's history were reviewed and updated as appropriate: allergies, current medications, past family history, past medical history, past social history, past surgical history and problem list     Review of Systems  Ortho Exam  Past Medical History:   Diagnosis Date    Acid reflux disease     Anemia     Anxiety     Cirrhosis ([facility] )     Depression     Fibromyalgia     Fibromyalgia     Gastritis     GERD (gastroesophageal reflux disease)     High blood pressure     Hyperlipidemia     Hypertension     [facility] )     Urinary tract infection      Past Surgical History:   Procedure Laterality Date    BREAST BIOPSY Right     2011    CHOLECYSTECTOMY      COLONOSCOPY      Fiberoptic    HYSTERECTOMY      UPPER GASTROINTESTINAL ENDOSCOPY      therapeutic     Family History   Problem Relation Age of Onset    Heart attack Mother     Heart disease Mother     Hypertension Mother         High Blood Prssure    Hyperlipidemia Mother         High Cholesterol    Stroke Mother     Breast cancer Sister     Depression Sister    Ardeth Needs Other Sister         Thyroid Cyst    Diabetes Brother     Ovarian cancer Sister     Stroke Grandchild     Cholelithiasis Family     Depression Family     Gallbladder disease Family      Social History     Occupational History    Not on file   Tobacco Use    Smoking status: Never Smoker    Smokeless tobacco: Never Used    Tobacco comment: Denied Tobacco Use   Vaping Use    Vaping Use: Never used   Substance and Sexual Activity    Alcohol use: No    Drug use: No    Sexual activity: Not Currently       Review of Systems   Constitutional: Negative  HENT: Negative  Eyes: Negative  Respiratory: Negative  Cardiovascular: Negative  Gastrointestinal: Negative  Endocrine: Negative  Genitourinary: Negative  Musculoskeletal: As below      Allergic/Immunologic: Negative  Neurological: Negative  Hematological: Negative  Psychiatric/Behavioral: Negative  Objective   Physical Exam        I have personally reviewed pertinent films in PACS and my interpretation is no acute displaced fracture  There is a large calcium deposit in the subacromial space       · Constitutional: Awake, Alert, Oriented  · Eyes: EOMI  · Psych: Mood and affect appropriate  · Heart: regular rate   · Lungs: No audible wheezing  · Abdomen: No guarding  · Lymph: no lymphedema       right Shoulder:  - Appearance   No swelling, discoloration, deformity, or ecchymosis  - Palpation   No tenderness to palpation of the clavicle, AC joint, biceps tendon, scapula, or proximal humerus  - ROM   Flexion: 150, ER: 90 and IR: L3  - Motor   Internal and external rotation 5/5 with shoulder at side, flexion 5-/5  - Special tests   Empty Can Test negative, Drop Arm Test negative and Hawkin's Impingement Test positive  - NVI distally    Large joint arthrocentesis: R subacromial bursa  Universal Protocol:  Consent: Verbal consent obtained    Risks and benefits: risks, benefits and alternatives were discussed  Consent given by: patient  Time out: Immediately prior to procedure a "time out" was called to verify the correct patient, procedure, equipment, support staff and site/side marked as required  Timeout called at: 2/2/2022 5:59 PM   Patient understanding: patient states understanding of the procedure being performed  Site marked: the operative site was marked  Radiology Images displayed and confirmed   If images not available, report reviewed: imaging studies available  Patient identity confirmed: verbally with patient    Supporting Documentation  Indications: pain   Procedure Details  Location: shoulder - R subacromial bursa  Needle size: 22 G  Ultrasound guidance: no  Approach: posterior  Medications administered: 2 mL lidocaine 1 %; 2 mL bupivacaine (PF) 0 5 %; 80 mg triamcinolone acetonide 40 mg/mL    Patient tolerance: patient tolerated the procedure well with no immediate complications  Dressing:  Sterile dressing applied

## 2022-02-02 NOTE — PATIENT INSTRUCTIONS
Shoulder Exercises  AMBULATORY CARE:   What you need to know about shoulder exercises:  Exercises help improve shoulder movement and strength, and decrease pain  Your physical therapist or healthcare provider will tell you when to start doing the exercises  He or she will tell you how often to do them  You will need to start slowly to prevent more injury  You will move through several levels over time as you get stronger and more flexible  Safety guidelines:   · Always warm up before you do the exercises  Walk or ride a stationary bike for 5 to 10 minutes to help you warm up  · Do not put your arm over your head until directed  You will need to wait until your injury has healed  The movement of some exercises could continue until your arm is over your head  You will need to stop the movement where directed  · Stop if you feel pain  You may feel some tight or stiff areas when you start  This should get better as you continue the exercises  You should not feel pain  Pain means you are not ready to do the exercise yet  Stop and call your physical therapist or healthcare provider right away  · Always work both rotator cuffs  Even if your injury is only on 1 side, it is important to do each exercise on both sides  This helps prevent injury and maintain balance in your shoulders and back  · Posture is important  Your physical therapist or healthcare provider will show you the proper posture for each exercise  You will be shown how to pull your shoulders back and down to engage the correct muscles  Remember not to let your shoulders shrug during an exercise unless it is part of the movement  How to do stretching exercises: You will not feel every exercise in your shoulder area  You may feel some of the stretches in your back, side, or upper arm muscles  You need to work muscles in and around your rotator cuffs and down your arms  This helps stabilize your shoulders   Your physical therapist or healthcare provider will tell you how long to hold each stretch  He or she will also tell you how many times to repeat each stretch during an exercise session  You may be told to do only certain exercises, or to do them in a specific order  The following are general directions to help you remember the exercises you are taught:  · Pendulum swings:  Lean forward and rest your arm on a table  Do not round your back or lock your knees during the exercise  Let your other arm hang freely by your side  Gently swing your free arm forward and backward, side to side, and in circles  · Crossover arm stretch:  Relax your shoulders  Hold your upper arm with the opposite hand  Pull your arm across your chest until you feel a stretch  Hold the stretch  Return to the starting position  · Sleeper stretch:  Lie on your side on a firm, flat surface  Bend the elbow of your top arm 90°  Use your other hand to slowly push your arm down  Stop when you feel a stretch at the back of your shoulder  Hold the stretch  Slowly return to the starting position  · Shoulder movement, up and down: This exercise may also be called shoulder extension  Sit in a chair that has a back but no armrests  Raise your arm like you are reaching for the wall in front of you  Continue to raise the arm until it is over your head, or as high as directed  Bring your arm back down to your side  Bring it back as far as possible behind your body  Return your arm to the starting position  · Shoulder movement, side to side: These movements may be called flexion, internal rotation, and external rotation  Sit in a chair that has a back but no armrests  Raise your arm to the side and then up over your head as far as directed  Return your arm to your side  Bring your arm across the front of your body and reach for the opposite shoulder  Return your arm to the starting position           · Shoulder rolls:  Stand and raise both shoulders toward your ears  Lower them to the starting position  Relax your shoulders  Pull your shoulders back  Then relax them again  Roll your shoulders in a smooth [place]  Then roll your shoulders in a smooth [place] in the other direction  · Wall reach exercise: This may also be called a flexion stretch  Stand facing a wall  Slowly walk your fingers up the wall until you feel a stretch  Hold the stretch  Return to the starting position  ·            · Up the back stretch:  Stand and put both arms behind your back  Put one hand under the other  Move the bottom hand and slowly push the upper hand up toward your head  You should feel a stretch in the front of your arm and shoulder  Be careful not to push too hard  Hold the stretch  Then return to the starting position  How to do strengthening exercises:  Strengthening exercises may include handheld weights or resistance bands  Your physical therapist or healthcare provider will tell you how much weight or resistance to use  The general guide is to use light weights or low resistance and to do a high number of repetitions  You may be told to do only certain exercises, or to do them in a specific order  The following are general directions to help you remember the exercises you are taught:  · Scapular squeeze:  Stand with your arms at your sides  Squeeze your shoulder blades together and hold this position  Then relax the muscles  Keep your shoulder back during the entire exercise                    ·

## 2022-02-17 ENCOUNTER — APPOINTMENT (OUTPATIENT)
Dept: LAB | Facility: CLINIC | Age: 67
End: 2022-02-17
Payer: MEDICARE

## 2022-02-17 DIAGNOSIS — K76.89 LIVER CYST: ICD-10-CM

## 2022-02-17 DIAGNOSIS — R10.9 ABDOMINAL PAIN, UNSPECIFIED ABDOMINAL LOCATION: ICD-10-CM

## 2022-02-17 LAB
ALBUMIN SERPL BCP-MCNC: 3.3 G/DL (ref 3.5–5)
ALP SERPL-CCNC: 107 U/L (ref 46–116)
ALT SERPL W P-5'-P-CCNC: 27 U/L (ref 12–78)
ANION GAP SERPL CALCULATED.3IONS-SCNC: 9 MMOL/L (ref 4–13)
AST SERPL W P-5'-P-CCNC: 15 U/L (ref 5–45)
BASOPHILS # BLD AUTO: 0.05 THOUSANDS/ΜL (ref 0–0.1)
BASOPHILS NFR BLD AUTO: 1 % (ref 0–1)
BILIRUB SERPL-MCNC: 0.32 MG/DL (ref 0.2–1)
BUN SERPL-MCNC: 16 MG/DL (ref 5–25)
CALCIUM ALBUM COR SERPL-MCNC: 9.6 MG/DL (ref 8.3–10.1)
CALCIUM SERPL-MCNC: 9 MG/DL (ref 8.3–10.1)
CHLORIDE SERPL-SCNC: 102 MMOL/L (ref 100–108)
CO2 SERPL-SCNC: 26 MMOL/L (ref 21–32)
CREAT SERPL-MCNC: 0.78 MG/DL (ref 0.6–1.3)
EOSINOPHIL # BLD AUTO: 0.16 THOUSAND/ΜL (ref 0–0.61)
EOSINOPHIL NFR BLD AUTO: 2 % (ref 0–6)
ERYTHROCYTE [DISTWIDTH] IN BLOOD BY AUTOMATED COUNT: 13.7 % (ref 11.6–15.1)
GFR SERPL CREATININE-BSD FRML MDRD: 80 ML/MIN/1.73SQ M
GLUCOSE SERPL-MCNC: 108 MG/DL (ref 65–140)
HCT VFR BLD AUTO: 39 % (ref 34.8–46.1)
HGB BLD-MCNC: 12.3 G/DL (ref 11.5–15.4)
IMM GRANULOCYTES # BLD AUTO: 0.04 THOUSAND/UL (ref 0–0.2)
IMM GRANULOCYTES NFR BLD AUTO: 0 % (ref 0–2)
LIPASE SERPL-CCNC: 130 U/L (ref 73–393)
LYMPHOCYTES # BLD AUTO: 3.26 THOUSANDS/ΜL (ref 0.6–4.47)
LYMPHOCYTES NFR BLD AUTO: 31 % (ref 14–44)
MCH RBC QN AUTO: 29.8 PG (ref 26.8–34.3)
MCHC RBC AUTO-ENTMCNC: 31.5 G/DL (ref 31.4–37.4)
MCV RBC AUTO: 94 FL (ref 82–98)
MONOCYTES # BLD AUTO: 1.24 THOUSAND/ΜL (ref 0.17–1.22)
MONOCYTES NFR BLD AUTO: 12 % (ref 4–12)
NEUTROPHILS # BLD AUTO: 5.76 THOUSANDS/ΜL (ref 1.85–7.62)
NEUTS SEG NFR BLD AUTO: 54 % (ref 43–75)
NRBC BLD AUTO-RTO: 0 /100 WBCS
PLATELET # BLD AUTO: 386 THOUSANDS/UL (ref 149–390)
PMV BLD AUTO: 9.9 FL (ref 8.9–12.7)
POTASSIUM SERPL-SCNC: 3.9 MMOL/L (ref 3.5–5.3)
PROT SERPL-MCNC: 8.3 G/DL (ref 6.4–8.2)
RBC # BLD AUTO: 4.13 MILLION/UL (ref 3.81–5.12)
SODIUM SERPL-SCNC: 137 MMOL/L (ref 136–145)
WBC # BLD AUTO: 10.51 THOUSAND/UL (ref 4.31–10.16)

## 2022-02-17 PROCEDURE — 80053 COMPREHEN METABOLIC PANEL: CPT

## 2022-02-17 PROCEDURE — 86682 HELMINTH ANTIBODY: CPT

## 2022-02-17 PROCEDURE — 85025 COMPLETE CBC W/AUTO DIFF WBC: CPT

## 2022-02-17 PROCEDURE — 83690 ASSAY OF LIPASE: CPT

## 2022-02-17 PROCEDURE — 36415 COLL VENOUS BLD VENIPUNCTURE: CPT

## 2022-02-22 ENCOUNTER — HOSPITAL ENCOUNTER (OUTPATIENT)
Dept: CT IMAGING | Facility: HOSPITAL | Age: 67
Discharge: HOME | End: 2022-02-22
Payer: MEDICARE

## 2022-02-22 DIAGNOSIS — R10.9 ABDOMINAL PAIN, UNSPECIFIED ABDOMINAL LOCATION: ICD-10-CM

## 2022-02-22 LAB — ECHINOCOCCUS AB SER QL IA: NEGATIVE

## 2022-02-22 PROCEDURE — 74177 CT ABD & PELVIS W/CONTRAST: CPT

## 2022-02-22 RX ADMIN — IOHEXOL 100 ML: 350 INJECTION, SOLUTION INTRAVENOUS at 18:28

## 2022-05-02 ENCOUNTER — OFFICE VISIT (OUTPATIENT)
Dept: OBGYN CLINIC | Facility: OTHER | Age: 67
End: 2022-05-02
Payer: MEDICARE

## 2022-05-02 VITALS
SYSTOLIC BLOOD PRESSURE: 134 MMHG | HEART RATE: 89 BPM | HEIGHT: 66 IN | WEIGHT: 156.2 LBS | BODY MASS INDEX: 25.1 KG/M2 | DIASTOLIC BLOOD PRESSURE: 87 MMHG

## 2022-05-02 DIAGNOSIS — M25.511 RIGHT SHOULDER PAIN, UNSPECIFIED CHRONICITY: ICD-10-CM

## 2022-05-02 DIAGNOSIS — M75.31 CALCIFIC TENDONITIS OF RIGHT SHOULDER: Primary | ICD-10-CM

## 2022-05-02 PROCEDURE — 99214 OFFICE O/P EST MOD 30 MIN: CPT | Performed by: ORTHOPAEDIC SURGERY

## 2022-05-02 RX ORDER — LIDOCAINE 50 MG/G
OINTMENT TOPICAL AS NEEDED
Qty: 35.44 G | Refills: 0 | Status: SHIPPED | OUTPATIENT
Start: 2022-05-02

## 2022-05-02 NOTE — PROGRESS NOTES
Chief Complaint:  Right shoulder pain    HPI:    Azeb Davis is a 73year old Female with history fibromyalgia who presents today for new evaluation and treatment of right shoulder pain  Athlete: No    Injury related: No    If not injury related:  6 months    Description of symptoms:  Patient here for evaluation of right shoulder pain  Reports ongoing shoulder pain for several months  Does not report any specific injury  She was seen by Manju Campo which she underwent an x-ray of her right shoulder and also a subacromial corticosteroid injection on 02/02/2022  Today she still reports some pain, she feels improved by 40 %  Still has pain with over head movements  She localizes pain to the lateral shoulder region  Denies any radiation of pain down the arm  Denies any numbness and tingling down to her hands  Denies any new injury  Summary of treatment to-date:  Subacromial corticosteroid injection    I have personally reviewed pertinent films in PACS and my interpretation is X-ray of right shoulder 02/02/2022:  No acute osseous abnormality  Calcific tendinitis all subacromial space  Moderate AC joint arthritis       Patient Active Problem List   Diagnosis    Salazar esophagus    Biliary cirrhosis (HCC)    Depression    Low vitamin D level    Diverticulosis    Dysphagia    Tricuspid regurgitation    Sjogren's syndrome ([facility] [address] [facility]    Ovarian cyst    Osteopenia    Lumbar spondylosis    Intestinal metaplasia of gastric mucosa    Hypertension    Hyperlipidemia    Hepatic cyst    Gastroparesis    Gastritis    Fibromyalgia    Esophageal reflux    Memory loss    Fatty liver disease, nonalcoholic    Annual physical exam    Vertigo        Current Outpatient Medications on File Prior to Visit   Medication Sig Dispense Refill    famotidine (PEPCID) 20 mg tablet Take 20 mg by mouth daily at bedtime      hydrocortisone (ANUSOL-HC) 2 5 % rectal cream Apply topically 2 (two) times a day 28 g 0  hydrOXYzine HCL (ATARAX) 50 mg tablet Take 50 mg by mouth daily at bedtime  0    risperiDONE (RisperDAL) 0 5 mg tablet Take 0 5 mg by mouth daily at bedtime  0    rosuvastatin (CRESTOR) 10 MG tablet take 1 tablet by mouth once daily 90 tablet 1    sertraline (ZOLOFT) 50 mg tablet Take 50 mg by mouth daily      traZODone (DESYREL) 50 mg tablet Take 50 mg by mouth daily at bedtime  0    triamcinolone (KENALOG) 0 1 % ointment Apply topically 2 (two) times a day 30 g 0    calcium carbonate (OS-MOLLY) 600 MG tablet Take 1 tablet (600 mg total) by mouth daily (Patient not taking: Reported on 1/26/2022 ) 90 tablet 1    Cholecalciferol (RA Vitamin D-3) 25 MCG (1000 UT) tablet Take 1 tablet (1,000 Units total) by mouth daily (Patient not taking: Reported on 1/26/2022 ) 90 tablet 1    esomeprazole (NexIUM) 40 MG capsule  (Patient not taking: Reported on 1/26/2022 )  0    meclizine (ANTIVERT) 12 5 MG tablet Take 1 tablet (12 5 mg total) by mouth 3 (three) times a day as needed for dizziness (Patient not taking: Reported on 1/26/2022 ) 30 tablet 0    Omega-3 1000 MG CAPS Take 1 capsule (1,000 mg total) by mouth 3 (three) times a day (Patient not taking: Reported on 1/26/2022 ) 90 capsule 3     No current facility-administered medications on file prior to visit          No Known Allergies     Tobacco Use: Low Risk     Smoking Tobacco Use: Never Smoker    Smokeless Tobacco Use: Never Used        Social Determinants of Health     Tobacco Use: Low Risk     Smoking Tobacco Use: Never Smoker    Smokeless Tobacco Use: Never Used   Alcohol Use: Not on file   Financial Resource Strain: Not on file   Food Insecurity: Not on file   Transportation Needs: Not on file   Physical Activity: Not on file   Stress: Not on file   Social Connections: Not on file   Intimate Partner Violence: Not on file   Depression: Not on file   Housing Stability: Not on file               Review of Systems   Constitutional: Negative for chills and fever    HENT: Negative for ear pain and sore throat  Eyes: Negative for pain and visual disturbance  Respiratory: Negative for cough and shortness of breath  Cardiovascular: Negative for chest pain and palpitations  Gastrointestinal: Negative for abdominal pain and vomiting  Genitourinary: Negative for dysuria and hematuria  Musculoskeletal: Negative for arthralgias and back pain  Skin: Negative for color change and rash  Neurological: Negative for seizures and syncope  All other systems reviewed and are negative  Body mass index is 25 21 kg/m²  Physical Exam  Vitals and nursing note reviewed  Constitutional:       General: She is not in acute distress  Appearance: She is well-developed  HENT:      Head: Normocephalic and atraumatic  Eyes:      Conjunctiva/sclera: Conjunctivae normal    Cardiovascular:      Rate and Rhythm: Normal rate and regular rhythm  Heart sounds: No murmur heard  Pulmonary:      Effort: Pulmonary effort is normal  No respiratory distress  Breath sounds: Normal breath sounds  Abdominal:      Palpations: Abdomen is soft  Tenderness: There is no abdominal tenderness  Musculoskeletal:      Cervical back: Neck supple  Skin:     General: Skin is warm and dry  Neurological:      Mental Status: She is alert  Ortho Exam:    Body part: right shoulder    Inspection: No deformity or swelling noted    Palpation: subacromial region    Range of motion: flexon 140 °, Painful arch of 20 to 120 ° abduction, external rotation 80°, internal rotation to lower lumbar region  Special Tests:  Mild discomfort with Saeed and Neer's  Negative speed's  Negative [place]'s  Negative apprehension  Distal Neurovascular Status: Intact, Yes    Procedures       Assessment:     Diagnosis ICD-10-CM Associated Orders   1  Calcific tendonitis of right shoulder  M75 31 lidocaine (XYLOCAINE) 5 % ointment   2   Right shoulder pain, unspecified chronicity  M25 511 lidocaine (XYLOCAINE) 5 % ointment        Plan:    Discussed clinical exam and findings with Ava Toro and her accompanying daughter  She does have evidence of rotator cuff syndrome with radiologic findings of calcific tendinitis of her right shoulder  She has good range of motion and strength on exam, I have advised her to continue with home exercises which were also given to her today  I will give her lidocaine ointment to use p r n  for pain relief  She can also use Tylenol as needed  I will see her back in 5 to 6 weeks, and if she still has pain, we will repeat x-rays of her shoulder and further imaging with an MRI to rule out rotator cuff tear before considering any further intervention  Follow-Up:    5-6 weeks        Portions of the record may have been created with voice recognition software  Occasional wrong word or "sound alike" substitutions may have occurred due to the inherent limitations of voice recognition software  Please review the chart carefully and recognize, using context, where substitutions/typographical errors may have occurred

## 2022-05-02 NOTE — PATIENT INSTRUCTIONS
Rotator Cuff Injury Exercises   AMBULATORY CARE:   What you need to know about rotator cuff injury exercises:  Exercises help improve shoulder movement and strength, and decrease pain  Your physical therapist or healthcare provider will tell you when to start doing the exercises  He or she will tell you how often to do them  You will need to start slowly to prevent more injury  You will move through several levels over time as you get stronger and more flexible  Safety guidelines:   · Always warm up before you do the exercises  Walk or ride a stationary bike for 5 to 10 minutes to help you warm up  · Do not put your arm over your head until directed  You will need to wait until your injury has healed  The movement of some exercises could continue until your arm is over your head  You will need to stop the movement where directed  · Stop if you feel pain  You may feel some tight or stiff areas when you start  This should get better as you continue the exercises  You should not feel pain  Pain means you are not ready to do the exercise yet  Stop and call your physical therapist or healthcare provider right away  · Always work both rotator cuffs  Even if your injury is only on 1 side, it is important to do each exercise on both sides  This helps prevent injury and maintain balance in your shoulders and back  · Posture is important  Your physical therapist or healthcare provider will show you the proper posture for each exercise  You will be shown how to pull your shoulders back and down to engage the correct muscles  Remember not to let your shoulders shrug during an exercise unless it is part of the movement  How to do stretching exercises: You will not feel every exercise in your shoulder area  You may feel some of the stretches in your back, side, or upper arm muscles  You need to work muscles in and around your rotator cuffs and down your arms  This helps stabilize your shoulders   Your physical therapist or healthcare provider will tell you how long to hold each stretch  He or she will also tell you how many times to repeat each stretch during an exercise session  You may be told to do only certain exercises, or to do them in a specific order  The following are general directions to help you remember the exercises you are taught:  · Pendulum swings:  Lean forward and rest your arm on a table  Do not round your back or lock your knees during the exercise  Let your other arm hang freely by your side  Gently swing your free arm forward and backward, side to side, and in circles  · Crossover arm stretch:  Relax your shoulders  Hold your upper arm with the opposite hand  Pull your arm across your chest until you feel a stretch  Hold the stretch  Return to the starting position  · Sleeper stretch:  Lie on your side on a firm, flat surface  Bend the elbow of your top arm 90°  Use your other hand to slowly push your arm down  Stop when you feel a stretch at the back of your shoulder  Hold the stretch  Slowly return to the starting position  · Shoulder movement, up and down: This exercise may also be called shoulder extension  Sit in a chair that has a back but no armrests  Raise your arm like you are reaching for the wall in front of you  Continue to raise the arm until it is over your head, or as high as directed  Bring your arm back down to your side  Bring it back as far as possible behind your body  Return your arm to the starting position  · Shoulder movement, side to side: These movements may be called flexion, internal rotation, and external rotation  Sit in a chair that has a back but no armrests  Raise your arm to the side and then up over your head as far as directed  Return your arm to your side  Bring your arm across the front of your body and reach for the opposite shoulder  Return your arm to the starting position           · Shoulder rolls:  Stand and raise both shoulders toward your ears  Lower them to the starting position  Relax your shoulders  Pull your shoulders back  Then relax them again  Roll your shoulders in a smooth [place]  Then roll your shoulders in a smooth [place] in the other direction  · Wall reach exercise: This may also be called a flexion stretch  Stand facing a wall  Slowly walk your fingers up the wall until you feel a stretch  Hold the stretch  Return to the starting position  · Arm reach exercise:  Lie on your back with your legs straight  Reach your arms like you are trying to touch the ceiling  Reach as high as you can so you feel a stretch in the back of your arms  Hold the stretch  Then lower your arms to your sides  · Elbow bends:  Stand with your arms down to your sides  Keep your palm facing forward  Bend your elbow and try to touch your shoulder with your fingertips  Return your arm to the starting position  · Up the back stretch:  Stand and put both arms behind your back  Put one hand under the other  Move the bottom hand and slowly push the upper hand up toward your head  You should feel a stretch in the front of your arm and shoulder  Be careful not to push too hard  Hold the stretch  Then return to the starting position  · Triceps stretch:  Stand and drop your forearm down your back so your hand is pointing to the ground behind you  Your elbow should be pointing at the ceiling  Take your other hand and place it on your elbow  Gently and slowly push on the elbow until you feel a stretch in the back of your arm  Hold the stretch  Let go of your elbow and relax your arm  You may be shown how to do this stretch with a towel  The towel can be pulled gently with a hand behind your back at waist level  How to do strengthening exercises:  Strengthening exercises may include handheld weights or resistance bands   Your physical therapist or healthcare provider will tell you how much weight or resistance to use  The general guide is to use light weights or low resistance and to do a high number of repetitions  You may be told to do only certain exercises, or to do them in a specific order  The following are general directions to help you remember the exercises you are taught:  · Scapular squeeze:  Stand with your arms at your sides  Squeeze your shoulder blades together and hold this position  Then relax the muscles  Keep your shoulder back during the entire exercise  · Wall pushups: This exercise is similar to a pushup done on the ground  The goal is to use your back and shoulder muscles to bring your upper body toward and away from the wall  Stand facing a wall  Put your hands on the wall  Bend your elbows to bring your upper body toward the wall  Straighten your arms to return to the starting position  Keep your feet close enough to the wall that you do not take a step when you bend your elbows  · Standing row with exercise band:  Wrap the exercise band around a heavy, stable object at waist level  Make loop in the end of the band to create a handle, if needed  Hold the handle or loop and pull the band straight back toward your hip  Keep your shoulder down  Squeeze your shoulder blade  Hold this position  Then slowly return to the starting position  · External rotation with arm abducted 90 degrees:  Wrap the exercise band around a heavy, stable object at waist level  Make loop in the end of the band to create a handle, if needed  Stand and hold the handle or loop  Bend your elbow and raise your arm to shoulder height  Keep your arm in this position  Raise your hand like you are pointing at the ceiling  Slowly return to the starting position  You may also be shown how to do this exercise lying down and with a weight  · Shoulder abduction with weight:  Stand and hold a weight in your hand with your palm facing your body   Slowly raise your arm to the side with your thumb pointing up  Then raise your arm as far as you can without pain  Hold this position  Then return to the starting position  · Shoulder abduction with exercise band:  Wrap the exercise band around a heavy, stable object near your foot  Grab the band  Keep your arm straight  Slowly raise your arm to the side with your thumb pointing up  Then, slowly pull the band as far as you can without pain  Slowly return to the starting position  · Shoulder adduction with weight:  Lie on your back on a firm surface  Hold a weight in your hand at your shoulder  Slowly raise your arm toward the ceiling and straighten your elbow  Hold this position  Then slowly return to the starting position  · Shoulder adduction with exercise band:  Wrap the exercise band around a heavy, stable object  Stand and face away from where the band is anchored  Hold each end of the band in both hands with your elbows bent  Your elbows should not be behind your body  Keep your arms parallel to the floor and slowly straighten your elbows  Hold this position  Slowly return to the starting position  Call your doctor or physical therapist if:   · You have sharp or worsening pain during exercise or at rest     · You have questions or concerns about your rotator cuff injury exercises  © Copyright [employer] 2022 Information is for End User's use only and may not be sold, redistributed or otherwise used for commercial purposes  All illustrations and images included in CareNotes® are the copyrighted property of [employer]  or Sherrill Vogel  The above information is an  only  It is not intended as medical advice for individual conditions or treatments  Talk to your doctor, nurse or pharmacist before following any medical regimen to see if it is safe and effective for you

## 2022-06-13 ENCOUNTER — TELEPHONE (OUTPATIENT)
Dept: OTHER | Facility: OTHER | Age: 67
End: 2022-06-13

## 2022-06-27 ENCOUNTER — OFFICE VISIT (OUTPATIENT)
Dept: DENTISTRY | Facility: CLINIC | Age: 67
End: 2022-06-27

## 2022-06-27 VITALS — HEART RATE: 78 BPM | SYSTOLIC BLOOD PRESSURE: 119 MMHG | DIASTOLIC BLOOD PRESSURE: 77 MMHG | TEMPERATURE: 97.7 F

## 2022-06-27 DIAGNOSIS — Z01.21 ENCOUNTER FOR DENTAL EXAMINATION AND CLEANING WITH ABNORMAL FINDINGS: Primary | ICD-10-CM

## 2022-06-27 PROBLEM — K05.30 PERIODONTITIS: Status: ACTIVE | Noted: 2022-06-27

## 2022-06-27 PROCEDURE — D1110 PROPHYLAXIS - ADULT: HCPCS

## 2022-06-27 PROCEDURE — D0120 PERIODIC ORAL EVALUATION - ESTABLISHED PATIENT: HCPCS

## 2022-06-27 PROCEDURE — D0274 BITEWINGS - 4 RADIOGRAPHIC IMAGES: HCPCS

## 2022-06-27 NOTE — PROGRESS NOTES
Nathaniel    Dental procedures in this visit     - PERIODIC ORAL EVALUATION - ESTABLISHED PATIENT (Completed)     Service provider: Thai Mcke[address] [facility]     Billing provider: Jessica Tee DDS     - PROPHYLAXIS - ADULT (Completed)     Service provider: Thai Mcke[address] [facility]     Billing provider: Jessica Tee, [address]  - BITEWINGS - 4 RADIOGRAPHIC IMAGES (Completed)     Service provider: Thai Mcke[address] [facility]     Billing provider: Jessica Tee DDS   Rev med/dent hist with Pt/ from [facility]  ASA-II  Pts CC= " Nothing at this time "  Used [place] Pad  for [facility] [place]  Method Used:  · Prophy Method Used: Hand Scaling  · Polished  · Flossed    Radiographs Taken:  · Bitewings x4    Intra/Extra Oral Cancer Screening:  · Within normal limits      Oral Hygiene:  · Good    Plaque:  · Localized  · Light    Calculus:  · Localized  · Light  · Lower anteriors    Bleeding:  · Bleeding on probing: No periodontal exam for this visit  · Localized  · Light    Stain:  · Localized  · Moderate    Periodontal Charting: SG-2B  · Spot probing    Periodontal Classification:  · Localized  · Moderate  · Gingivitis      Nutritional Counseling:  · N/A      [place]    No orders of the defined types were placed in this encounter  Periodic Exam:By Dr Hernandez  -Watch #3-M  -#12-crown prep / PRED is back  -Asked pt if she was interested in replacing her missing posterior teeth  Patient is not interested at this time      NV:#12-Crown  NV2:6 mths prophy with periodic exam

## 2022-09-22 ENCOUNTER — APPOINTMENT (OUTPATIENT)
Dept: LAB | Facility: CLINIC | Age: 67
End: 2022-09-22
Payer: MEDICARE

## 2022-09-22 DIAGNOSIS — I10 PRIMARY HYPERTENSION: ICD-10-CM

## 2022-09-22 DIAGNOSIS — E78.5 HYPERLIPIDEMIA, UNSPECIFIED HYPERLIPIDEMIA TYPE: ICD-10-CM

## 2022-09-22 DIAGNOSIS — E88.89 MADELUNG'S NECK (HCC): ICD-10-CM

## 2022-09-22 DIAGNOSIS — Z79.899 ENCOUNTER FOR LONG-TERM (CURRENT) USE OF OTHER MEDICATIONS: ICD-10-CM

## 2022-09-22 DIAGNOSIS — F33.3 SEVERE RECURRENT MAJOR DEPRESSION WITH PSYCHOTIC FEATURES (HCC): ICD-10-CM

## 2022-09-22 LAB
25(OH)D3 SERPL-MCNC: 24.6 NG/ML (ref 30–100)
ALBUMIN SERPL BCP-MCNC: 3.2 G/DL (ref 3.5–5)
ALP SERPL-CCNC: 129 U/L (ref 46–116)
ALT SERPL W P-5'-P-CCNC: 27 U/L (ref 12–78)
ANION GAP SERPL CALCULATED.3IONS-SCNC: 4 MMOL/L (ref 4–13)
AST SERPL W P-5'-P-CCNC: 20 U/L (ref 5–45)
BASOPHILS # BLD AUTO: 0.05 THOUSANDS/ΜL (ref 0–0.1)
BASOPHILS NFR BLD AUTO: 1 % (ref 0–1)
BILIRUB SERPL-MCNC: 0.34 MG/DL (ref 0.2–1)
BUN SERPL-MCNC: 16 MG/DL (ref 5–25)
CALCIUM ALBUM COR SERPL-MCNC: 9.8 MG/DL (ref 8.3–10.1)
CALCIUM SERPL-MCNC: 9.2 MG/DL (ref 8.3–10.1)
CHLORIDE SERPL-SCNC: 107 MMOL/L (ref 96–108)
CHOLEST SERPL-MCNC: 221 MG/DL
CO2 SERPL-SCNC: 28 MMOL/L (ref 21–32)
CREAT SERPL-MCNC: 0.74 MG/DL (ref 0.6–1.3)
EOSINOPHIL # BLD AUTO: 0.14 THOUSAND/ΜL (ref 0–0.61)
EOSINOPHIL NFR BLD AUTO: 2 % (ref 0–6)
ERYTHROCYTE [DISTWIDTH] IN BLOOD BY AUTOMATED COUNT: 13.4 % (ref 11.6–15.1)
EST. AVERAGE GLUCOSE BLD GHB EST-MCNC: 114 MG/DL
GFR SERPL CREATININE-BSD FRML MDRD: 85 ML/MIN/1.73SQ M
GLUCOSE P FAST SERPL-MCNC: 90 MG/DL (ref 65–99)
HBA1C MFR BLD: 5.6 %
HCT VFR BLD AUTO: 39.2 % (ref 34.8–46.1)
HDLC SERPL-MCNC: 35 MG/DL
HGB BLD-MCNC: 12.2 G/DL (ref 11.5–15.4)
IMM GRANULOCYTES # BLD AUTO: 0.01 THOUSAND/UL (ref 0–0.2)
IMM GRANULOCYTES NFR BLD AUTO: 0 % (ref 0–2)
LDLC SERPL CALC-MCNC: 150 MG/DL (ref 0–100)
LYMPHOCYTES # BLD AUTO: 2.87 THOUSANDS/ΜL (ref 0.6–4.47)
LYMPHOCYTES NFR BLD AUTO: 38 % (ref 14–44)
MCH RBC QN AUTO: 29.5 PG (ref 26.8–34.3)
MCHC RBC AUTO-ENTMCNC: 31.1 G/DL (ref 31.4–37.4)
MCV RBC AUTO: 95 FL (ref 82–98)
MONOCYTES # BLD AUTO: 0.93 THOUSAND/ΜL (ref 0.17–1.22)
MONOCYTES NFR BLD AUTO: 12 % (ref 4–12)
NEUTROPHILS # BLD AUTO: 3.5 THOUSANDS/ΜL (ref 1.85–7.62)
NEUTS SEG NFR BLD AUTO: 47 % (ref 43–75)
NONHDLC SERPL-MCNC: 186 MG/DL
NRBC BLD AUTO-RTO: 0 /100 WBCS
PLATELET # BLD AUTO: 321 THOUSANDS/UL (ref 149–390)
PMV BLD AUTO: 10.3 FL (ref 8.9–12.7)
POTASSIUM SERPL-SCNC: 4.3 MMOL/L (ref 3.5–5.3)
PROLACTIN SERPL-MCNC: 12.7 NG/ML
PROT SERPL-MCNC: 8.2 G/DL (ref 6.4–8.4)
RBC # BLD AUTO: 4.14 MILLION/UL (ref 3.81–5.12)
SODIUM SERPL-SCNC: 139 MMOL/L (ref 135–147)
TRIGL SERPL-MCNC: 182 MG/DL
TSH SERPL DL<=0.05 MIU/L-ACNC: 2.14 UIU/ML (ref 0.45–4.5)
WBC # BLD AUTO: 7.5 THOUSAND/UL (ref 4.31–10.16)

## 2022-09-22 PROCEDURE — 85025 COMPLETE CBC W/AUTO DIFF WBC: CPT

## 2022-09-22 PROCEDURE — 82306 VITAMIN D 25 HYDROXY: CPT

## 2022-09-22 PROCEDURE — 83036 HEMOGLOBIN GLYCOSYLATED A1C: CPT

## 2022-09-22 PROCEDURE — 80061 LIPID PANEL: CPT

## 2022-09-22 PROCEDURE — 84146 ASSAY OF PROLACTIN: CPT

## 2022-09-22 PROCEDURE — 36415 COLL VENOUS BLD VENIPUNCTURE: CPT

## 2022-09-22 PROCEDURE — 80053 COMPREHEN METABOLIC PANEL: CPT

## 2022-09-22 PROCEDURE — 84443 ASSAY THYROID STIM HORMONE: CPT

## 2022-10-10 ENCOUNTER — ANNUAL EXAM (OUTPATIENT)
Dept: OBGYN CLINIC | Facility: CLINIC | Age: 67
End: 2022-10-10
Payer: MEDICARE

## 2022-10-10 VITALS
SYSTOLIC BLOOD PRESSURE: 132 MMHG | BODY MASS INDEX: 22.66 KG/M2 | HEIGHT: 66 IN | WEIGHT: 141 LBS | DIASTOLIC BLOOD PRESSURE: 82 MMHG

## 2022-10-10 DIAGNOSIS — N95.2 POSTMENOPAUSAL ATROPHIC VAGINITIS: ICD-10-CM

## 2022-10-10 DIAGNOSIS — Z01.419 WOMEN'S ANNUAL ROUTINE GYNECOLOGICAL EXAMINATION: Primary | ICD-10-CM

## 2022-10-10 PROCEDURE — G0101 CA SCREEN;PELVIC/BREAST EXAM: HCPCS | Performed by: NURSE PRACTITIONER

## 2022-10-10 RX ORDER — BETAMETHASONE DIPROPIONATE 0.5 MG/G
OINTMENT TOPICAL
Qty: 30 G | Refills: 0 | Status: SHIPPED | OUTPATIENT
Start: 2022-10-10

## 2022-10-10 NOTE — PROGRESS NOTES
Subjective    HPI:     Carolyn Buerger is a 73 y o  female  She has not been sexually active for 20 years  She complains of external burning and itching with urination  She has been ongoing for about 1 week  She denies GI and Gyn complaints  She feels safe at home  She states her depression/anxiety is stable  Medical, surgical and family history reviewed  Her dental care is up-to-date  She tries to eat a healthy diet and stays active  Gynecologic History    No LMP recorded (lmp unknown)  Patient has had a hysterectomy  Last Pap: 10/25/18  Results were: normal  Last mammogram: 10/21/2020  Results were: normal  Colonoscopy: 2020 -external hemorrhoids, 2 polypectomy, diverticulosis noted  DXA scan:20 - Osteopenia repeat 18-24 months    Obstetric History    OB History    Para Term  AB Living   3 3       3   SAB IAB Ectopic Multiple Live Births           3      # Outcome Date GA Lbr Fredi/2nd Weight Sex Delivery Anes PTL Lv   3 Para            2 Para            1 Para                The following portions of the patient's history were reviewed and updated as appropriate: allergies, current medications, past family history, past medical history, past social history, past surgical history and problem list     Review of Systems    Pertinent items are noted in HPI  Objective    Physical Exam  Constitutional:       Appearance: Normal appearance  She is well-developed  Genitourinary:      Vulva, bladder and urethral meatus normal       No lesions in the vagina  Right Labia: skin changes  Right Labia: No rash, tenderness, lesions or Bartholin's cyst      Left Labia: skin changes  Left Labia: No tenderness, lesions, Bartholin's cyst or rash  No labial fusion noted  Vulva exam comments: There is evidence of atrophic vaginitis due to estrogen deficiency in the external genitalia  The skin within the labia folds is thin, shiny and mildly erythematous  Mary Plaza       No inguinal adenopathy present in the right or left side  Vaginal cuff intact  No vaginal discharge, erythema, tenderness, bleeding or granulation tissue  No vaginal prolapse present  No vaginal atrophy present  Right Adnexa: absent  Left Adnexa: absent  Cervix is absent  Uterus is not enlarged, tender, irregular or prolapsed  No uterine mass detected  Pelvic exam was performed with patient in the lithotomy position  Breasts: Breasts are symmetrical       Right: No inverted nipple, mass, nipple discharge, skin change, tenderness, axillary adenopathy or supraclavicular adenopathy  Left: No inverted nipple, mass, nipple discharge, skin change, tenderness, axillary adenopathy or supraclavicular adenopathy  HENT:      Head: Normocephalic and atraumatic  Neck:      Thyroid: No thyromegaly  Cardiovascular:      Rate and Rhythm: Normal rate and regular rhythm  Heart sounds: Normal heart sounds, S1 normal and S2 normal    Pulmonary:      Effort: Pulmonary effort is normal       Breath sounds: Normal breath sounds  Abdominal:      General: Bowel sounds are normal  There is no distension  Palpations: Abdomen is soft  There is no mass  Tenderness: There is no abdominal tenderness  There is no guarding  Hernia: There is no hernia in the left inguinal area or right inguinal area  Musculoskeletal:      Cervical back: Neck supple  Lymphadenopathy:      Cervical: No cervical adenopathy  Upper Body:      Right upper body: No supraclavicular or axillary adenopathy  Left upper body: No supraclavicular or axillary adenopathy  Lower Body: No right inguinal adenopathy  No left inguinal adenopathy  Neurological:      Mental Status: She is alert  Skin:     General: Skin is warm and dry  Findings: No rash     Psychiatric:         Attention and Perception: Attention and perception normal          Mood and Affect: Mood and affect normal          Speech: Speech normal          Behavior: Behavior is cooperative  Thought Content: Thought content normal          Cognition and Memory: Cognition and memory normal          Judgment: Judgment normal    Vitals and nursing note reviewed  Assessment and Plan    Bassam Davenport was seen today for gynecologic exam     Diagnoses and all orders for this visit:    Women's annual routine gynecological examination    Postmenopausal atrophic vaginitis  -     betamethasone, augmented, (DIPROLENE) 0 05 % ointment; Apply externally BID for 7 to 10 days and then taper to twice weekly as needed  Patient informed of a Stable GYN exam  A pap smear was not performed  I have discussed the importance of exercise and healthy diet as well as adequate intake of calcium and vitamin D  The current ASCCP guidelines were reviewed  The low risk patient will receive pap smear screening every 3 years until the age of 34 and then every 3 to 5 years with HPV co-testing from the ages of 33-67  I emphasized the importance of an annual pelvic and breast exam  A yearly mammogram is scheduled is in November through [facility]  Topical steroid ointment prescribed she was instructed to apply a small amount externally around the are that is itching and burning  Initially she will apply it BID for 7 to 10 days and then taper down to no more than twice weekly as needed  Results will be released to [facility], if abnormal will call to review and discuss treatment plan  All questions have been answered to her satisfaction  Follow up in: 2 years or sooner if needed

## 2022-10-20 DIAGNOSIS — E78.5 HYPERLIPIDEMIA, UNSPECIFIED HYPERLIPIDEMIA TYPE: ICD-10-CM

## 2022-10-20 DIAGNOSIS — M35.00 SJOGREN'S SYNDROME, WITH UNSPECIFIED ORGAN INVOLVEMENT (HCC): ICD-10-CM

## 2022-10-20 RX ORDER — ROSUVASTATIN CALCIUM 10 MG/1
TABLET, COATED ORAL
Qty: 90 TABLET | Refills: 1 | Status: SHIPPED | OUTPATIENT
Start: 2022-10-20

## 2022-11-28 DIAGNOSIS — Z12.39 SCREENING FOR BREAST CANCER: ICD-10-CM

## 2023-01-31 ENCOUNTER — TELEPHONE (OUTPATIENT)
Dept: FAMILY MEDICINE CLINIC | Facility: CLINIC | Age: 68
End: 2023-01-31

## 2023-01-31 DIAGNOSIS — K29.70 GASTRITIS, PRESENCE OF BLEEDING UNSPECIFIED, UNSPECIFIED CHRONICITY, UNSPECIFIED GASTRITIS TYPE: ICD-10-CM

## 2023-01-31 DIAGNOSIS — K21.9 GASTROESOPHAGEAL REFLUX DISEASE, UNSPECIFIED WHETHER ESOPHAGITIS PRESENT: ICD-10-CM

## 2023-01-31 DIAGNOSIS — K31.84 GASTROPARESIS: Primary | ICD-10-CM

## 2023-01-31 NOTE — TELEPHONE ENCOUNTER
Patient is transferring from [facility] to [facility] FOR SURGERY and is asking for an ambulatory referral to Gastroenterology with diagnosis of K31 [id number], K29 70, K21 9,

## 2023-03-20 ENCOUNTER — OFFICE VISIT (OUTPATIENT)
Dept: GASTROENTEROLOGY | Facility: CLINIC | Age: 68
End: 2023-03-20

## 2023-03-20 VITALS
TEMPERATURE: 96.3 F | HEART RATE: 88 BPM | DIASTOLIC BLOOD PRESSURE: 84 MMHG | BODY MASS INDEX: 24.66 KG/M2 | HEIGHT: 65 IN | SYSTOLIC BLOOD PRESSURE: 150 MMHG | WEIGHT: 148 LBS

## 2023-03-20 DIAGNOSIS — K29.70 GASTRITIS, PRESENCE OF BLEEDING UNSPECIFIED, UNSPECIFIED CHRONICITY, UNSPECIFIED GASTRITIS TYPE: ICD-10-CM

## 2023-03-20 DIAGNOSIS — K59.09 OTHER CONSTIPATION: ICD-10-CM

## 2023-03-20 DIAGNOSIS — Z12.11 SCREENING FOR COLON CANCER: ICD-10-CM

## 2023-03-20 DIAGNOSIS — K31.84 GASTROPARESIS: Primary | ICD-10-CM

## 2023-03-20 DIAGNOSIS — K74.5 BILIARY CIRRHOSIS (HCC): ICD-10-CM

## 2023-03-20 DIAGNOSIS — K21.9 GASTROESOPHAGEAL REFLUX DISEASE, UNSPECIFIED WHETHER ESOPHAGITIS PRESENT: ICD-10-CM

## 2023-03-20 RX ORDER — DICYCLOMINE HYDROCHLORIDE 10 MG/1
10 CAPSULE ORAL
Qty: 120 CAPSULE | Refills: 0 | Status: SHIPPED | OUTPATIENT
Start: 2023-03-20

## 2023-03-20 NOTE — PATIENT INSTRUCTIONS
Scheduled date of EGD(as of today):04/26/2023  Physician performing EGD:Jossue  Location of EGD:[place]  Instructions reviewed with patient by:Magalis  Clearances:  N/A

## 2023-03-20 NOTE — PROGRESS NOTES
[facility] - Outpatient Consultation  Keith Has 78 y o  female MRN: [id number]  Encounter: [id number]          ASSESSMENT AND PLAN:      1  Gastroparesis  2  Gastritis, presence of bleeding unspecified, unspecified chronicity, unspecified gastritis type  Patient is complaining about persistent abdominal pain ongoing for years  Abdominal pain is generalized, she also has a complaint of abdominal bloating and persistent fullness, denies severe nausea or vomiting, denies weight loss  She has previous history of gastroparesis  Most recent gastric emptying is from 2019 but result could not be retrieved  She was started on Bentyl as needed  We will repeat gastric emptying study  We will perform EGD  Follow-up after EGD and gastric emptying    3  Gastroesophageal reflux disease, unspecified whether esophagitis present  Chronic GERD ongoing for years   she is complaining about globus sensation every day  She is currently on PPI, continue  Perform EGD to assess for esophagitis      4  Biliary cirrhosis ([facility] )  Patient used to follow-up with [facility] and has a diagnosis of biliary cirrhosis listed  No imaging is showing cirrhosis  Images from recent CT of the abdomen from 2022 were reviewed, the liver does not appear to have cirrhotic morphology, a large liver cyst can be visualized in the right lobe  No liver biopsy could be found  Most recent LFTs are normal  Patient discloses she has fatty liver but no cirrhosis  We will check LFTs    5  Screening for colon cancer  6    Constipation  - Patient is 78 y o , she has been screened for colon cancer in the past, most recent colonoscopy was in 2020 at [facility], she was found to have 2 polyps, she was recommended to repeat in 5 to 10 years, she is due in 2025, denies any family history of GI malignancy or IBD, no alarm symptoms at this point, currently some constipation, having bowel movements about every 2 days, previous notes from  GI were noticed, she was on Linzess at some point, as per the patient she was using this as needed, she was recommended to start MiraLAX and Dulcolax as needed  ______________________________________________________________________    HPI:  Patient seen and examined, she is a 79-year-old female patient with past medical history significant for gastroparesis, she is currently complaining about persistent abdominal pain, the pain is described as generalized, the pain is always present but is usually exacerbated by eating she also is complaining about persistent sensation of fullness, she is also complaining about globus sensation and heartburn, denies nausea or vomiting, denies weight loss, otherwise she denies any recent events, currently is tolerating PO route,  is having bowel movements about every 2 days, no recent weight loss      REVIEW OF SYSTEMS:    CONSTITUTIONAL: Denies any fever, chills, or weight loss  HEENT: No earache or visual disturbances  CARDIOVASCULAR: No chest pain or palpitations  RESPIRATORY: Denies shortness of breath or dyspnea on exertion  GASTROINTESTINAL: As noted in the History of Present Illness  GENITOURINARY: No problems with urination  NEUROLOGIC: No dizziness or headaches  MUSCULOSKELETAL: No muscle or joint pain  SKIN: No skin rashes or itching  ENDOCRINE: No intolerance to heat or cold        Historical Information   Past Medical History:   Diagnosis Date   • Acid reflux disease    • Anemia    • Anxiety    • Cirrhosis ([place] [address] )    • Depression    • Fibromyalgia    • Fibromyalgia    • Gastritis    • GERD (gastroesophageal reflux disease)    • High blood pressure    • Hyperlipidemia    • Hypertension    • Rheumatoid arthritis ([facility] )    • Urinary tract infection      Past Surgical History:   Procedure Laterality Date   • BREAST BIOPSY Right     2011   • CHOLECYSTECTOMY     • COLONOSCOPY      Fiberoptic   • HYSTERECTOMY     • UPPER GASTROINTESTINAL ENDOSCOPY      therapeutic Social History   Social History     Substance and Sexual Activity   Alcohol Use No     Social History     Substance and Sexual Activity   Drug Use No     Social History     Tobacco Use   Smoking Status Never   Smokeless Tobacco Never   Tobacco Comments    Denied Tobacco Use     Family History   Problem Relation Age of Onset   • Heart attack Mother    • Heart disease Mother    • Hypertension Mother         High Blood Prssure   • Hyperlipidemia Mother         High Cholesterol   • Stroke Mother    • Breast cancer Sister    • Depression Sister    • Other Sister         Thyroid Cyst   • Diabetes Brother    • Ovarian cancer Sister    • Stroke Grandchild    • Cholelithiasis Family    • Depression Family    • Gallbladder disease Family        Meds/Allergies       Current Outpatient Medications:   •  betamethasone, augmented, (DIPROLENE) 0 05 % ointment  •  calcium carbonate (OS-MOLLY) 600 MG tablet  •  Cholecalciferol (RA Vitamin D-3) 25 MCG (1000 UT) tablet  •  dicyclomine (BENTYL) 10 mg capsule  •  esomeprazole (NexIUM) 40 MG capsule  •  famotidine (PEPCID) 20 mg tablet  •  hydrocortisone (ANUSOL-HC) 2 5 % rectal cream  •  lidocaine (XYLOCAINE) 5 % ointment  •  hydrOXYzine HCL (ATARAX) 50 mg tablet  •  risperiDONE (RisperDAL) 0 5 mg tablet  •  rosuvastatin (CRESTOR) 10 MG tablet  •  sertraline (ZOLOFT) 50 mg tablet  •  traZODone (DESYREL) 50 mg tablet    No Known Allergies        Objective     Blood pressure 150/84, pulse 88, temperature (!) 96 3 °F (35 7 °C), temperature source Tympanic, height 5' 5" (1 651 m), weight 67 1 kg (148 lb)  Body mass index is 24 63 kg/m²  PHYSICAL EXAM:      General Appearance:   Alert, cooperative, no distress   HEENT:   Normocephalic, atraumatic, anicteric  Neck:  Supple, symmetrical, trachea midline   Lungs:   Clear to auscultation bilaterally; no rales, rhonchi or wheezing; respirations unlabored    Heart[de-identified]   Regular rate and rhythm     Abdomen:   Soft, mild generalized tenderness, non-distended; normal bowel sounds; no masses, no organomegaly    Genitalia:   Deferred    Rectal:   Deferred    Extremities:  No cyanosis or edema                  Lymph nodes: No palpable cervical lymphadenopathy   Skin:  No jaundice, rashes, or lesions              Lab Results:   No visits with results within 1 Day(s) from this visit     Latest known visit with results is:   Appointment on 09/22/2022   Component Date Value   • Cholesterol 09/22/2022 221 (H)    • Triglycerides 09/22/2022 182 (H)    • HDL, Direct 09/22/2022 35 (L)    • LDL Calculated 09/22/2022 150 (H)    • Non-HDL-Chol (CHOL-HDL) 09/22/2022 186    • Sodium 09/22/2022 139    • Potassium 09/22/2022 4 3    • Chloride 09/22/2022 107    • CO2 09/22/2022 28    • ANION GAP 09/22/2022 4    • BUN 09/22/2022 16    • Creatinine 09/22/2022 0 74    • Glucose, Fasting 09/22/2022 90    • Calcium 09/22/2022 9 2    • Corrected Calcium 09/22/2022 9 8    • AST 09/22/2022 20    • ALT 09/22/2022 27    • Alkaline Phosphatase 09/22/2022 129 (H)    • Total Protein 09/22/2022 8 2    • Albumin 09/22/2022 3 2 (L)    • Total Bilirubin 09/22/2022 0 34    • eGFR 09/22/2022 85    • WBC 09/22/2022 7 50    • RBC 09/22/2022 4 14    • Hemoglobin 09/22/2022 12 2    • Hematocrit 09/22/2022 39 2    • MCV 09/22/2022 95    • MCH 09/22/2022 29 5    • MCHC 09/22/2022 31 1 (L)    • RDW 09/22/2022 13 4    • MPV 09/22/2022 10 3    • Platelets 15/54/3301 321    • nRBC 09/22/2022 0    • Neutrophils Relative 09/22/2022 47    • Immat GRANS % 09/22/2022 0    • Lymphocytes Relative 09/22/2022 38    • Monocytes Relative 09/22/2022 12    • Eosinophils Relative 09/22/2022 2    • Basophils Relative 09/22/2022 1    • Neutrophils Absolute 09/22/2022 3 50    • Immature Grans Absolute 09/22/2022 0 01    • Lymphocytes Absolute 09/22/2022 2 87    • Monocytes Absolute 09/22/2022 0 93    • Eosinophils Absolute 09/22/2022 0 14    • Basophils Absolute 09/22/2022 0 05    • TSH 3RD GENERATON 09/22/2022 2 140    • Hemoglobin A1C 09/22/2022 5 6    • EAG 09/22/2022 114    • Vit D, 25-Hydroxy 09/22/2022 24 6 (L)    • Prolactin 09/22/2022 12 7          Radiology Results:   No results found

## 2023-04-18 DIAGNOSIS — M35.00 SJOGREN'S SYNDROME, WITH UNSPECIFIED ORGAN INVOLVEMENT (HCC): ICD-10-CM

## 2023-04-18 DIAGNOSIS — E78.5 HYPERLIPIDEMIA, UNSPECIFIED HYPERLIPIDEMIA TYPE: ICD-10-CM

## 2023-04-18 NOTE — TELEPHONE ENCOUNTER
Message left from pt's pharmacy if reference to the quantity of her rosuvastatin refill:     Hi, this is Tash calling from [facility]  I'm calling on patient Milvia Stevens  Her [birth year] birthday [birth year]  I had called over a week ago about her rosuvastatin 10 milligram tablets for insurance Company has asked us to get a prescription for 100 tablets at a time  So I'm asking for a script for 100 tablets and she takes 1A day  This is [employer]  We're at [facility]  My phone number is [phone]  Thank you

## 2023-04-20 ENCOUNTER — APPOINTMENT (OUTPATIENT)
Dept: LAB | Facility: CLINIC | Age: 68
End: 2023-04-20

## 2023-04-20 DIAGNOSIS — K74.5 BILIARY CIRRHOSIS (HCC): ICD-10-CM

## 2023-04-20 LAB
ALBUMIN SERPL BCP-MCNC: 3.2 G/DL (ref 3.5–5)
ALP SERPL-CCNC: 110 U/L (ref 46–116)
ALT SERPL W P-5'-P-CCNC: 24 U/L (ref 12–78)
ANION GAP SERPL CALCULATED.3IONS-SCNC: 5 MMOL/L (ref 4–13)
AST SERPL W P-5'-P-CCNC: 17 U/L (ref 5–45)
BASOPHILS # BLD AUTO: 0.06 THOUSANDS/ΜL (ref 0–0.1)
BASOPHILS NFR BLD AUTO: 1 % (ref 0–1)
BILIRUB SERPL-MCNC: 0.51 MG/DL (ref 0.2–1)
BUN SERPL-MCNC: 13 MG/DL (ref 5–25)
CALCIUM ALBUM COR SERPL-MCNC: 9.8 MG/DL (ref 8.3–10.1)
CALCIUM SERPL-MCNC: 9.2 MG/DL (ref 8.3–10.1)
CHLORIDE SERPL-SCNC: 105 MMOL/L (ref 96–108)
CO2 SERPL-SCNC: 27 MMOL/L (ref 21–32)
CREAT SERPL-MCNC: 0.9 MG/DL (ref 0.6–1.3)
EOSINOPHIL # BLD AUTO: 0.15 THOUSAND/ΜL (ref 0–0.61)
EOSINOPHIL NFR BLD AUTO: 2 % (ref 0–6)
ERYTHROCYTE [DISTWIDTH] IN BLOOD BY AUTOMATED COUNT: 12.9 % (ref 11.6–15.1)
GFR SERPL CREATININE-BSD FRML MDRD: 66 ML/MIN/1.73SQ M
GLUCOSE P FAST SERPL-MCNC: 87 MG/DL (ref 65–99)
HCT VFR BLD AUTO: 37.1 % (ref 34.8–46.1)
HGB BLD-MCNC: 11.8 G/DL (ref 11.5–15.4)
IMM GRANULOCYTES # BLD AUTO: 0.01 THOUSAND/UL (ref 0–0.2)
IMM GRANULOCYTES NFR BLD AUTO: 0 % (ref 0–2)
LYMPHOCYTES # BLD AUTO: 3.36 THOUSANDS/ΜL (ref 0.6–4.47)
LYMPHOCYTES NFR BLD AUTO: 38 % (ref 14–44)
MCH RBC QN AUTO: 29.8 PG (ref 26.8–34.3)
MCHC RBC AUTO-ENTMCNC: 31.8 G/DL (ref 31.4–37.4)
MCV RBC AUTO: 94 FL (ref 82–98)
MONOCYTES # BLD AUTO: 1.03 THOUSAND/ΜL (ref 0.17–1.22)
MONOCYTES NFR BLD AUTO: 12 % (ref 4–12)
NEUTROPHILS # BLD AUTO: 4.13 THOUSANDS/ΜL (ref 1.85–7.62)
NEUTS SEG NFR BLD AUTO: 47 % (ref 43–75)
NRBC BLD AUTO-RTO: 0 /100 WBCS
PLATELET # BLD AUTO: 349 THOUSANDS/UL (ref 149–390)
PMV BLD AUTO: 9.7 FL (ref 8.9–12.7)
POTASSIUM SERPL-SCNC: 3.8 MMOL/L (ref 3.5–5.3)
PROT SERPL-MCNC: 8.1 G/DL (ref 6.4–8.4)
RBC # BLD AUTO: 3.96 MILLION/UL (ref 3.81–5.12)
SODIUM SERPL-SCNC: 137 MMOL/L (ref 135–147)
WBC # BLD AUTO: 8.74 THOUSAND/UL (ref 4.31–10.16)

## 2023-04-21 RX ORDER — ROSUVASTATIN CALCIUM 10 MG/1
10 TABLET, COATED ORAL DAILY
Qty: 100 TABLET | Refills: 1 | Status: SHIPPED | OUTPATIENT
Start: 2023-04-21

## 2023-04-25 RX ORDER — SODIUM CHLORIDE 9 MG/ML
125 INJECTION, SOLUTION INTRAVENOUS CONTINUOUS
Status: CANCELLED | OUTPATIENT
Start: 2023-04-25

## 2023-04-26 ENCOUNTER — HOSPITAL ENCOUNTER (OUTPATIENT)
Dept: GASTROENTEROLOGY | Facility: MEDICAL CENTER | Age: 68
Setting detail: OUTPATIENT SURGERY
Discharge: HOME | End: 2023-04-26

## 2023-04-26 ENCOUNTER — ANESTHESIA EVENT (OUTPATIENT)
Dept: GASTROENTEROLOGY | Facility: MEDICAL CENTER | Age: 68
End: 2023-04-26

## 2023-04-26 ENCOUNTER — ANESTHESIA (OUTPATIENT)
Dept: GASTROENTEROLOGY | Facility: MEDICAL CENTER | Age: 68
End: 2023-04-26

## 2023-04-26 VITALS
HEART RATE: 82 BPM | RESPIRATION RATE: 18 BRPM | OXYGEN SATURATION: 98 % | TEMPERATURE: 97.9 F | WEIGHT: 148 LBS | BODY MASS INDEX: 24.66 KG/M2 | SYSTOLIC BLOOD PRESSURE: 118 MMHG | DIASTOLIC BLOOD PRESSURE: 62 MMHG | HEIGHT: 65 IN

## 2023-04-26 DIAGNOSIS — K21.9 GASTROESOPHAGEAL REFLUX DISEASE, UNSPECIFIED WHETHER ESOPHAGITIS PRESENT: ICD-10-CM

## 2023-04-26 DIAGNOSIS — K31.84 GASTROPARESIS: ICD-10-CM

## 2023-04-26 DIAGNOSIS — K29.70 GASTRITIS, PRESENCE OF BLEEDING UNSPECIFIED, UNSPECIFIED CHRONICITY, UNSPECIFIED GASTRITIS TYPE: ICD-10-CM

## 2023-04-26 RX ORDER — LIDOCAINE HYDROCHLORIDE 20 MG/ML
INJECTION, SOLUTION EPIDURAL; INFILTRATION; INTRACAUDAL; PERINEURAL AS NEEDED
Status: DISCONTINUED | OUTPATIENT
Start: 2023-04-26 | End: 2023-04-26

## 2023-04-26 RX ORDER — PROPOFOL 10 MG/ML
INJECTION, EMULSION INTRAVENOUS AS NEEDED
Status: DISCONTINUED | OUTPATIENT
Start: 2023-04-26 | End: 2023-04-26

## 2023-04-26 RX ORDER — SODIUM CHLORIDE 9 MG/ML
125 INJECTION, SOLUTION INTRAVENOUS CONTINUOUS
Status: DISCONTINUED | OUTPATIENT
Start: 2023-04-26 | End: 2023-04-30 | Stop reason: HOSPADM

## 2023-04-26 RX ADMIN — PROPOFOL 30 MG: 10 INJECTION, EMULSION INTRAVENOUS at 10:21

## 2023-04-26 RX ADMIN — SODIUM CHLORIDE 125 ML/HR: 0.9 INJECTION, SOLUTION INTRAVENOUS at 09:58

## 2023-04-26 RX ADMIN — PROPOFOL 150 MG: 10 INJECTION, EMULSION INTRAVENOUS at 10:17

## 2023-04-26 RX ADMIN — LIDOCAINE HYDROCHLORIDE 100 MG: 20 INJECTION, SOLUTION EPIDURAL; INFILTRATION; INTRACAUDAL; PERINEURAL at 10:17

## 2023-04-26 NOTE — ANESTHESIA PREPROCEDURE EVALUATION
Procedure:  EGD    Relevant Problems   CARDIO   (+) Hyperlipidemia   (+) Hypertension      GI/HEPATIC           (+) Esophageal reflux   (+) Fatty liver disease, nonalcoholic   (+) Hepatic cyst                                    Physical Exam    Airway    Mallampati score: II  TM Distance: >3 FB  Neck ROM: full     Dental       Cardiovascular  Rhythm: regular, Rate: normal,     Pulmonary  Breath sounds clear to auscultation,     Other Findings        Anesthesia Plan  ASA Score- 2     Anesthesia Type- IV sedation with anesthesia with ASA Monitors  Additional Monitors:   Airway Plan:           Plan Factors-    Chart reviewed  Existing labs reviewed  Induction- intravenous  Postoperative Plan-     Informed Consent- Anesthetic plan and risks discussed with patient

## 2023-04-26 NOTE — H&P
"History and Physical -  [facility]  Katya Rodgers 78 y o  female MRN: [id number]                  HPI: Katya Rodgers is a 78y o  year old female who presents for EGD for the assessment of gastroparesis      REVIEW OF SYSTEMS: Per the HPI, and otherwise unremarkable  Historical Information   Past Medical History:   Diagnosis Date    Acid reflux disease     Anemia     Anxiety     Cirrhosis ([facility] )     Depression     Fibromyalgia     Fibromyalgia     Gastritis     GERD (gastroesophageal reflux disease)     High blood pressure     Hyperlipidemia     Hypertension     Rheumatoid arthritis ([facility] )     Urinary tract infection      Past Surgical History:   Procedure Laterality Date    BREAST BIOPSY Right     2011    CHOLECYSTECTOMY      COLONOSCOPY      Fiberoptic    HYSTERECTOMY      UPPER GASTROINTESTINAL ENDOSCOPY      therapeutic     Social History   Social History     Substance and Sexual Activity   Alcohol Use No     Social History     Substance and Sexual Activity   Drug Use No     Social History     Tobacco Use   Smoking Status Never   Smokeless Tobacco Never   Tobacco Comments    Denied Tobacco Use     Family History   Problem Relation Age of Onset    Heart attack Mother     Heart disease Mother     Hypertension Mother         High Blood Prssure    Hyperlipidemia Mother         High Cholesterol    Stroke Mother     Breast cancer Sister     Depression Sister     Other Sister         Thyroid Cyst    Diabetes Brother     Ovarian cancer Sister     Stroke Grandchild     Cholelithiasis Family     Depression Family     Gallbladder disease Family        Meds/Allergies     (Not in a hospital admission)      No Known Allergies    Objective     Blood pressure 144/82, pulse 71, temperature 97 9 °F (36 6 °C), resp  rate 20, height 5' 5\" (1 651 m), weight 67 1 kg (148 lb), SpO2 97 %        PHYSICAL EXAM    Gen: NAD  CV: RRR  CHEST: Clear  ABD: soft, NT/ND  EXT: no " edema      ASSESSMENT/PLAN:  This is a 78y o  year old female here for EGD

## 2023-04-26 NOTE — ANESTHESIA POSTPROCEDURE EVALUATION
"Post-Op Assessment Note    CV Status:  Stable    Pain management: adequate     Mental Status:  Alert and awake   Hydration Status:  Euvolemic   PONV Controlled:  Controlled   Airway Patency:  Patent      Post Op Vitals Reviewed: Yes      Staff: Anesthesiologist         No notable events documented      BP      Temp      Pulse     Resp      SpO2      /62   Pulse 82   Temp 97 9 °F (36 6 °C)   Resp 18   Ht 5' 5\" (1 651 m)   Wt 67 1 kg (148 lb)   LMP  (LMP Unknown)   SpO2 98%   BMI 24 63 kg/m²     "

## 2023-05-01 ENCOUNTER — HOSPITAL ENCOUNTER (OUTPATIENT)
Dept: RADIOLOGY | Facility: HOSPITAL | Age: 68
Discharge: HOME | End: 2023-05-01
Attending: INTERNAL MEDICINE

## 2023-05-01 DIAGNOSIS — K31.84 GASTROPARESIS: ICD-10-CM

## 2023-05-02 ENCOUNTER — RA CDI HCC (OUTPATIENT)
Dept: OTHER | Facility: HOSPITAL | Age: 68
End: 2023-05-02

## 2023-05-02 NOTE — PROGRESS NOTES
[facility]  coding opportunities       Chart reviewed, no opportunity found:   [address]        Patients Insurance     Medicare Insurance: [employer] Advantage

## 2023-05-08 ENCOUNTER — OFFICE VISIT (OUTPATIENT)
Dept: FAMILY MEDICINE CLINIC | Facility: CLINIC | Age: 68
End: 2023-05-08

## 2023-05-08 VITALS
HEIGHT: 65 IN | TEMPERATURE: 97.5 F | OXYGEN SATURATION: 96 % | SYSTOLIC BLOOD PRESSURE: 138 MMHG | DIASTOLIC BLOOD PRESSURE: 82 MMHG | RESPIRATION RATE: 16 BRPM | HEART RATE: 82 BPM | WEIGHT: 148.4 LBS | BODY MASS INDEX: 24.72 KG/M2

## 2023-05-08 DIAGNOSIS — T75.3XXS MOTION SICKNESS, SEQUELA: ICD-10-CM

## 2023-05-08 DIAGNOSIS — M85.80 OSTEOPENIA, UNSPECIFIED LOCATION: ICD-10-CM

## 2023-05-08 DIAGNOSIS — M54.9 MID BACK PAIN ON RIGHT SIDE: ICD-10-CM

## 2023-05-08 DIAGNOSIS — R79.89 LOW VITAMIN D LEVEL: ICD-10-CM

## 2023-05-08 DIAGNOSIS — Z13.820 SCREENING FOR OSTEOPOROSIS: ICD-10-CM

## 2023-05-08 DIAGNOSIS — Z78.0 POSTMENOPAUSAL: ICD-10-CM

## 2023-05-08 DIAGNOSIS — I10 PRIMARY HYPERTENSION: ICD-10-CM

## 2023-05-08 DIAGNOSIS — Z00.00 ENCOUNTER FOR ANNUAL WELLNESS EXAM IN MEDICARE PATIENT: Primary | ICD-10-CM

## 2023-05-08 DIAGNOSIS — M35.00 SJOGREN'S SYNDROME, WITH UNSPECIFIED ORGAN INVOLVEMENT (HCC): ICD-10-CM

## 2023-05-08 DIAGNOSIS — E78.2 MODERATE MIXED HYPERLIPIDEMIA NOT REQUIRING STATIN THERAPY: ICD-10-CM

## 2023-05-08 DIAGNOSIS — F33.9 RECURRENT MAJOR DEPRESSIVE DISORDER, REMISSION STATUS UNSPECIFIED (HCC): ICD-10-CM

## 2023-05-08 DIAGNOSIS — K74.5 BILIARY CIRRHOSIS (HCC): ICD-10-CM

## 2023-05-08 PROBLEM — R42 VERTIGO: Status: RESOLVED | Noted: 2021-05-19 | Resolved: 2023-05-08

## 2023-05-08 RX ORDER — SCOLOPAMINE TRANSDERMAL SYSTEM 1 MG/1
1 PATCH, EXTENDED RELEASE TRANSDERMAL
Qty: 24 PATCH | Refills: 0 | Status: SHIPPED | OUTPATIENT
Start: 2023-05-08

## 2023-05-08 NOTE — PATIENT INSTRUCTIONS
Try salon pas patches  Medicare Preventive Visit Patient Instructions  Thank you for completing your Welcome to Medicare Visit or Medicare Annual Wellness Visit today  Your next wellness visit will be due in one year (5/8/2024)  The screening/preventive services that you may require over the next 5-10 years are detailed below  Some tests may not apply to you based off risk factors and/or age  Screening tests ordered at today's visit but not completed yet may show as past due  Also, please note that scanned in results may not display below  Preventive Screenings:  Service Recommendations Previous Testing/Comments   Colorectal Cancer Screening  * Colonoscopy    * Fecal Occult Blood Test (FOBT)/Fecal Immunochemical Test (FIT)  * Fecal DNA/Cologuard Test  * Flexible Sigmoidoscopy Age: 39-70 years old   Colonoscopy: every 10 years (may be performed more frequently if at higher risk)  OR  FOBT/FIT: every 1 year  OR  Cologuard: every 3 years  OR  Sigmoidoscopy: every 5 years  Screening may be recommended earlier than age 39 if at higher risk for colorectal cancer  Also, an individualized decision between you and your healthcare provider will decide whether screening between the ages of 74-80 would be appropriate  Colonoscopy: 11/09/2020  FOBT/FIT: Not on file  Cologuard: Not on file  Sigmoidoscopy: Not on file    Screening Current     Breast Cancer Screening Age: 36 years old  Frequency: every 1-2 years  Not required if history of left and right mastectomy Mammogram: 11/18/2022    Screening Current   Cervical Cancer Screening Between the ages of 21-29, pap smear recommended once every 3 years  Between the ages of 33-67, can perform pap smear with HPV co-testing every 5 years     Recommendations may differ for women with a history of total hysterectomy, cervical cancer, or abnormal pap smears in past  Pap Smear: 10/10/2022    Screening Not Indicated   Hepatitis C Screening Once for adults born between 1945 and 1965  More frequently in patients at high risk for Hepatitis C Hep C Antibody: 02/20/2013    Screening Current   Diabetes Screening 1-2 times per year if you're at risk for diabetes or have pre-diabetes Fasting glucose: 87 mg/dL (4/20/2023)  A1C: 5 6 % (9/22/2022)  Screening Current   Cholesterol Screening Once every 5 years if you don't have a lipid disorder  May order more often based on risk factors  Lipid panel: 09/22/2022    Screening Not Indicated  History Lipid Disorder     Other Preventive Screenings Covered by Medicare:  Abdominal Aortic Aneurysm (AAA) Screening: covered once if your at risk  You're considered to be at risk if you have a family history of AAA  Lung Cancer Screening: covers low dose CT scan once per year if you meet all of the following conditions: (1) Age 50-69; (2) No signs or symptoms of lung cancer; (3) Current smoker or have quit smoking within the last 15 years; (4) You have a tobacco smoking history of at least 20 pack years (packs per day multiplied by number of years you smoked); (5) You get a written order from a healthcare provider  Glaucoma Screening: covered annually if you're considered high risk: (1) You have diabetes OR (2) Family history of glaucoma OR (3)  aged 48 and older OR (3)  American aged 72 and older  Osteoporosis Screening: covered every 2 years if you meet one of the following conditions: (1) You're estrogen deficient and at risk for osteoporosis based off medical history and other findings; (2) Have a vertebral abnormality; (3) On glucocorticoid therapy for more than 3 months; (4) Have primary hyperparathyroidism; (5) On osteoporosis medications and need to assess response to drug therapy  Last bone density test (DXA Scan): 12/22/2020  HIV Screening: covered annually if you're between the age of 12-76  Also covered annually if you are younger than 13 and older than 72 with risk factors for HIV infection   For pregnant patients, it is covered up to 3 times per pregnancy  Immunizations:  Immunization Recommendations   Influenza Vaccine Annual influenza vaccination during flu season is recommended for all persons aged >= 6 months who do not have contraindications   Pneumococcal Vaccine   * Pneumococcal conjugate vaccine = PCV13 (Prevnar 13), PCV15 (Vaxneuvance), PCV20 (Prevnar 20)  * Pneumococcal polysaccharide vaccine = PPSV23 (Pneumovax) Adults 25-60 years old: 1-3 doses may be recommended based on certain risk factors  Adults 72 years old: 1-2 doses may be recommended based off what pneumonia vaccine you previously received   Hepatitis B Vaccine 3 dose series if at intermediate or high risk (ex: diabetes, end stage renal disease, liver disease)   Tetanus (Td) Vaccine - COST NOT COVERED BY MEDICARE PART B Following completion of primary series, a booster dose should be given every 10 years to maintain immunity against tetanus  Td may also be given as tetanus wound prophylaxis  Tdap Vaccine - COST NOT COVERED BY MEDICARE PART B Recommended at least once for all adults  For pregnant patients, recommended with each pregnancy  Shingles Vaccine (Shingrix) - COST NOT COVERED BY MEDICARE PART B  2 shot series recommended in those aged 48 and above     Health Maintenance Due:      Topic Date Due    Breast Cancer Screening: Mammogram  11/18/2023    Colorectal Cancer Screening  11/09/2025    Hepatitis C Screening  Completed     Immunizations Due:      Topic Date Due    Hepatitis B Vaccine (1 of 3 - Risk 3-dose series) Never done    Pneumococcal Vaccine: 65+ Years (2 - PCV) 10/14/2020    COVID-19 Vaccine (4 - Booster for Anderson Peter series) 01/12/2022     Advance Directives   What are advance directives? Advance directives are legal documents that state your wishes and plans for medical care  These plans are made ahead of time in case you lose your ability to make decisions for yourself   Advance directives can apply to any medical decision, such as the treatments you want, and if you want to donate organs  What are the types of advance directives? There are many types of advance directives, and each state has rules about how to use them  You may choose a combination of any of the following:  Living will: This is a written record of the treatment you want  You can also choose which treatments you do not want, which to limit, and which to stop at a certain time  This includes surgery, medicine, IV fluid, and tube feedings  Durable power of  for healthcare [facility]): This is a written record that states who you want to make healthcare choices for you when you are unable to make them for yourself  This person, called a proxy, is usually a family member or a friend  You may choose more than 1 proxy  Do not resuscitate (DNR) order:  A DNR order is used in case your heart stops beating or you stop breathing  It is a request not to have certain forms of treatment, such as CPR  A DNR order may be included in other types of advance directives  Medical directive: This covers the care that you want if you are in a coma, near death, or unable to make decisions for yourself  You can list the treatments you want for each condition  Treatment may include pain medicine, surgery, blood transfusions, dialysis, IV or tube feedings, and a ventilator (breathing machine)  Values history: This document has questions about your views, beliefs, and how you feel and think about life  This information can help others choose the care that you would choose  Why are advance directives important? An advance directive helps you control your care  Although spoken wishes may be used, it is better to have your wishes written down  Spoken wishes can be misunderstood, or not followed  Treatments may be given even if you do not want them  An advance directive may make it easier for your family to make difficult choices about your care         © Copyright [employer] 2018 Information is for End User's use only and may not be sold, redistributed or otherwise used for commercial purposes   All illustrations and images included in CareNotes® are the copyrighted property of A D A M , Inc  or [facility] Suleiman Vogel

## 2023-05-08 NOTE — PROGRESS NOTES
Assessment and Plan:     Problem List Items Addressed This Visit        Digestive    Biliary cirrhosis ([facility] )     Goes to [facility]             Cardiovascular and Mediastinum    Hypertension      Diet controlled  Continue off medications  Continue low-salt diet  Relevant Orders    Basic metabolic panel       Musculoskeletal and Integument    Osteopenia    Relevant Orders    DXA bone density spine hip and pelvis    Vitamin D 25 hydroxy       Other    Depression     Goes to a psychiatry through [address] [facility], and is on trazodone 50 at bedtime and zoloft, risperidone  Low vitamin D level    Relevant Orders    Vitamin D 25 hydroxy    Vitamin D 25 hydroxy    Sjogren's syndrome ([facility] )    Hyperlipidemia       Well controlled on Crestor 10 mg daily  Repeat lipid panel in 6 months         Relevant Orders    Lipid panel    Lipid panel    Encounter for annual wellness exam in Medicare patient - Primary     DEXA showed osteopenia 12/20  Recommended vit d and calcium  Recommended weight bearing exercises  Repeat  Mammogram due in November  Colonoscopy UTD 2020 with 5y recall  She goes to gastroenterology  Goes to gyn for paps  UTD  Other Visit Diagnoses     Screening for osteoporosis        Relevant Orders    DXA bone density spine hip and pelvis    Postmenopausal        Relevant Orders    DXA bone density spine hip and pelvis    Mid back pain on right side        Relevant Orders    XR spine thoracic 3 vw    Motion sickness, sequela        Relevant Medications    scopolamine (TRANSDERM-SCOP) 1 mg/3 days TD 72 hr patch           Preventive health issues were discussed with patient, and age appropriate screening tests were ordered as noted in patient's After Visit Summary  Personalized health advice and appropriate referrals for health education or preventive services given if needed, as noted in patient's After Visit Summary       History of Present Illness:     Patient presents for a Medicare Wellness Visit    HPI     Here with her daughter who is providing translation  She is concerned about right sided mid back pain for the last 2 months  Denies falls  The pain is worse when sleeping  She does not want to go to PT  She takes tylenol prn  Labs reviewed  CMP with stable kidney function, liver enzymes and fasting sugars, normal cbc  Lipids have not been done since sept and were elevated  Vit d mildly low in September  Patient Care Team:  Faisal Blanco MD as PCP - General (Family Medicine)  Jorge Gardner MD as PCP - [address] [place] [address] (RTE)  Jorge Gardner MD as PCP - PCP-[employer] (RTE)  Parish Pena,      Review of Systems:     Review of Systems   Constitutional: Negative for fever and unexpected weight change  HENT: Negative for ear pain, sore throat and trouble swallowing  Eyes: Negative for pain and visual disturbance  Respiratory: Negative for cough, chest tightness, shortness of breath and wheezing  Cardiovascular: Negative for chest pain  Gastrointestinal: Negative for abdominal distention, abdominal pain, blood in stool, constipation, diarrhea, nausea and vomiting  Endocrine: Negative for polydipsia and polyuria  Genitourinary: Negative for dysuria and hematuria  Musculoskeletal: Negative for back pain and myalgias  Skin: Negative for rash  Neurological: Negative for syncope and headaches  Psychiatric/Behavioral: Negative for suicidal ideas          Problem List:     Patient Active Problem List   Diagnosis   • Salazar esophagus   • Biliary cirrhosis (HCC)   • Depression   • Low vitamin D level   • Diverticulosis   • Dysphagia   • Tricuspid regurgitation   • Sjogren's syndrome (HCC)   • Ovarian cyst   • Osteopenia   • Lumbar spondylosis   • Intestinal metaplasia of gastric mucosa   • Hypertension   • Hyperlipidemia   • Hepatic cyst   • Gastroparesis   • Esophageal reflux   • Memory loss   • Fatty liver disease, nonalcoholic   • Encounter for annual wellness exam in Medicare patient   • Periodontitis      Past Medical and Surgical History:     Past Medical History:   Diagnosis Date   • Acid reflux disease    • Anemia    • Anxiety    • Cirrhosis ([place] [address] )    • Depression    • Fibromyalgia    • Fibromyalgia    • Gastritis    • GERD (gastroesophageal reflux disease)    • High blood pressure    • Hyperlipidemia    • Hypertension    • Rheumatoid arthritis ([facility] )    • Urinary tract infection      Past Surgical History:   Procedure Laterality Date   • BREAST BIOPSY Right     2011   • CHOLECYSTECTOMY     • COLONOSCOPY      Fiberoptic   • HYSTERECTOMY     • UPPER GASTROINTESTINAL ENDOSCOPY      therapeutic      Family History:     Family History   Problem Relation Age of Onset   • Heart attack Mother    • Heart disease Mother    • Hypertension Mother         High Blood Prssure   • Hyperlipidemia Mother         High Cholesterol   • Stroke Mother    • Breast cancer Sister    • Depression Sister    • Other Sister         Thyroid Cyst   • Diabetes Brother    • Ovarian cancer Sister    • Stroke Grandchild    • Cholelithiasis Family    • Depression Family    • Gallbladder disease Family       Social History:     Social History     Socioeconomic History   • Marital status: Single     Spouse name: None   • Number of children: None   • Years of education: None   • Highest education level: None   Occupational History   • None   Tobacco Use   • Smoking status: Never   • Smokeless tobacco: Never   • Tobacco comments:     Denied Tobacco Use   Vaping Use   • Vaping Use: Never used   Substance and Sexual Activity   • Alcohol use: No   • Drug use: No   • Sexual activity: Not Currently   Other Topics Concern   • None   Social History Narrative    Non smoker     No known smoke exposure '    No caffeine     No Alcohol use     No illicit drug use     Has 3 children     Single     Lives with daughter      Social Determinants of Health     Financial Resource Strain: Not on file   Food Insecurity: Not on file   Transportation Needs: Not on file   Physical Activity: Not on file   Stress: Not on file   Social Connections: Not on file   Intimate Partner Violence: Not on file   Housing Stability: Not on file      Medications and Allergies:     Current Outpatient Medications   Medication Sig Dispense Refill   • calcium carbonate (OS-MOLLY) 600 MG tablet Take 1 tablet (600 mg total) by mouth daily 90 tablet 1   • Cholecalciferol (RA Vitamin D-3) 25 MCG (1000 UT) tablet Take 1 tablet (1,000 Units total) by mouth daily 90 tablet 1   • dicyclomine (BENTYL) 10 mg capsule take 1 capsule by mouth four times a day before meals and at bedtime 120 capsule 0   • esomeprazole (NexIUM) 40 MG capsule   0   • rosuvastatin (CRESTOR) 10 MG tablet Take 1 tablet (10 mg total) by mouth daily 100 tablet 1   • scopolamine (TRANSDERM-SCOP) 1 mg/3 days TD 72 hr patch Place 1 patch on the skin over 72 hours every third day 24 patch 0   • betamethasone, augmented, (DIPROLENE) 0 05 % ointment Apply externally BID for 7 to 10 days and then taper to twice weekly as needed  (Patient not taking: Reported on 5/8/2023) 30 g 0   • famotidine (PEPCID) 20 mg tablet Take 20 mg by mouth daily at bedtime (Patient not taking: Reported on 4/26/2023)     • hydrocortisone (ANUSOL-HC) 2 5 % rectal cream Apply topically 2 (two) times a day (Patient not taking: Reported on 5/8/2023) 28 g 0   • lidocaine (XYLOCAINE) 5 % ointment Apply topically as needed for mild pain (Patient not taking: Reported on 4/26/2023) 35 44 g 0     No current facility-administered medications for this visit       No Known Allergies   Immunizations:     Immunization History   Administered Date(s) Administered   • COVID-19 PFIZER VACCINE 0 3 ML IM 03/10/2021, 04/01/2021, 11/17/2021   • Hep A, adult 08/19/2020, 05/19/2021   • INFLUENZA 12/14/2006, 02/20/2013, 12/04/2013, 07/30/2014, 08/08/2015, 08/16/2016, 08/22/2017, 09/14/2018, 09/09/2020, 09/24/2021   • Influenza, injectable, quadrivalent, preservative free 0 5 mL 09/14/2018, 10/03/2019   • Influenza, seasonal, injectable, preservative free 09/14/2018   • Pneumococcal Polysaccharide PPV23 10/14/2019   • Tdap 03/23/2015   • Zoster Vaccine Recombinant 09/15/2020, 12/28/2020      Health Maintenance:         Topic Date Due   • Breast Cancer Screening: Mammogram  11/18/2023   • Colorectal Cancer Screening  11/09/2025   • Hepatitis C Screening  Completed         Topic Date Due   • Hepatitis B Vaccine (1 of 3 - Risk 3-dose series) Never done   • Pneumococcal Vaccine: 65+ Years (2 - PCV) 10/14/2020   • COVID-19 Vaccine (4 - Booster for Pfizer series) 01/12/2022      Medicare Screening Tests and Risk Assessments:     Tavon Moreau is here for her Initial Wellness visit  Last Medicare Wellness visit information reviewed, patient interviewed, no change since last AWV  Health Risk Assessment:   Patient rates overall health as poor  Patient feels that their physical health rating is same  Patient is satisfied with their life  Eyesight was rated as slightly worse  Hearing was rated as same  Patient feels that their emotional and mental health rating is same  Patients states they are always angry  Patient states they are always unusually tired/fatigued  Pain experienced in the last 7 days has been some  Patient's pain rating has been 9/10  Patient states that she has experienced no weight loss or gain in last 6 months  Patient reports stomach pain     Fall Risk Screening: In the past year, patient has experienced: no history of falling in past year      Urinary Incontinence Screening:   Patient has not leaked urine accidently in the last six months  Home Safety:  Patient does not have trouble with stairs inside or outside of their home  Patient has working smoke alarms and has working carbon monoxide detector  Home safety hazards include: none  Nutrition:   Current diet is Regular       Medications: Patient is not currently taking any over-the-counter supplements  Patient is able to manage medications  Activities of Daily Living (ADLs)/Instrumental Activities of Daily Living (IADLs):   Walk and transfer into and out of bed and chair?: Yes  Dress and groom yourself?: Yes    Bathe or shower yourself?: Yes    Feed yourself? Yes  Do your laundry/housekeeping?: Yes  Manage your money, pay your bills and track your expenses?: Yes  Make your own meals?: Yes    Do your own shopping?: Yes    Previous Hospitalizations:   Any hospitalizations or ED visits within the last 12 months?: No      Advance Care Planning:   Living will: No    Advanced directive: No    Five wishes given: Yes      Cognitive Screening:   Provider or family/friend/caregiver concerned regarding cognition?: No    PREVENTIVE SCREENINGS      Cardiovascular Screening:    General: Screening Not Indicated and History Lipid Disorder      Diabetes Screening:     General: Screening Current      Colorectal Cancer Screening:     General: Screening Current      Breast Cancer Screening:     General: Screening Current      Cervical Cancer Screening:    General: Screening Not Indicated      Osteoporosis Screening:    General: Risks and Benefits Discussed    Due for: DXA Axial      Abdominal Aortic Aneurysm (AAA) Screening:        General: Screening Not Indicated      Lung Cancer Screening:     General: Screening Not Indicated      Hepatitis C Screening:    General: Screening Current    Screening, Brief Intervention, and Referral to Treatment (SBIRT)    Screening  Typical number of drinks in a day: 0  Typical number of drinks in a week: 0  Interpretation: Low risk drinking behavior      Single Item Drug Screening:  How often have you used an illegal drug (including marijuana) or a prescription medication for non-medical reasons in the past year? never    Single Item Drug Screen Score: 0  Interpretation: Negative screen for possible drug use disorder    Brief "Intervention  Alcohol & drug use screenings were reviewed  No concerns regarding substance use disorder identified  Other Counseling Topics:   Calcium and vitamin D intake and regular weightbearing exercise  No results found  Physical Exam:     /82 (BP Location: Left arm, Patient Position: Sitting, Cuff Size: Adult)   Pulse 82   Temp 97 5 °F (36 4 °C) (Temporal)   Resp 16   Ht 5' 5\" (1 651 m)   Wt 67 3 kg (148 lb 6 4 oz)   LMP  (LMP Unknown)   SpO2 96%   BMI 24 70 kg/m²     Physical Exam  Constitutional:       Appearance: She is well-developed  HENT:      Head: Normocephalic and atraumatic  Eyes:      General: No scleral icterus  Conjunctiva/sclera: Conjunctivae normal       Pupils: Pupils are equal, round, and reactive to light  Cardiovascular:      Rate and Rhythm: Normal rate and regular rhythm  Heart sounds: Normal heart sounds  No murmur heard  No friction rub  No gallop  Pulmonary:      Effort: Pulmonary effort is normal  No respiratory distress  Breath sounds: No wheezing or rales  Chest:      Chest wall: No tenderness  Abdominal:      General: Bowel sounds are normal  There is no distension  Palpations: Abdomen is soft  There is no mass  Tenderness: There is no abdominal tenderness  Musculoskeletal:         General: Normal range of motion  Cervical back: Normal range of motion  Skin:     General: Skin is warm and dry  Findings: No rash  Neurological:      Mental Status: She is alert and oriented to person, place, and time  Cranial Nerves: No cranial nerve deficit            Arun Lugo MD  "

## 2023-05-08 NOTE — ASSESSMENT & PLAN NOTE
DEXA showed osteopenia 12/20  Recommended vit d and calcium  Recommended weight bearing exercises  Repeat  Mammogram due in November  Colonoscopy UTD 2020 with 5y recall  She goes to gastroenterology  Goes to gyn for paps  UTD

## 2023-05-11 ENCOUNTER — TELEPHONE (OUTPATIENT)
Dept: GASTROENTEROLOGY | Facility: CLINIC | Age: 68
End: 2023-05-11

## 2023-05-11 NOTE — TELEPHONE ENCOUNTER
Spoke to daughter [employer] and offered OB for 6/12/23 9:00 am  Unfortunately, not available to bring patient in, works that day  Scheduled for 7/10/23 8:00 am instead  ----- Message from Fawad Roass MD sent at 5/10/2023  2:07 PM EDT -----  Please schedule an appointment at my office for follow-up of gastroparesis in  4-6 weeks, is okay to overbook, the patient's daughter prefers a Monday morning   Thanks

## 2023-05-15 ENCOUNTER — APPOINTMENT (OUTPATIENT)
Dept: LAB | Facility: CLINIC | Age: 68
End: 2023-05-15

## 2023-05-15 ENCOUNTER — HOSPITAL ENCOUNTER (OUTPATIENT)
Dept: RADIOLOGY | Facility: HOSPITAL | Age: 68
Discharge: HOME | End: 2023-05-15

## 2023-05-15 DIAGNOSIS — E78.2 MODERATE MIXED HYPERLIPIDEMIA NOT REQUIRING STATIN THERAPY: ICD-10-CM

## 2023-05-15 DIAGNOSIS — M54.9 MID BACK PAIN ON RIGHT SIDE: ICD-10-CM

## 2023-05-15 DIAGNOSIS — R79.89 LOW VITAMIN D LEVEL: ICD-10-CM

## 2023-05-15 DIAGNOSIS — K31.84 GASTROPARESIS: ICD-10-CM

## 2023-05-15 DIAGNOSIS — M85.80 OSTEOPENIA, UNSPECIFIED LOCATION: ICD-10-CM

## 2023-05-15 LAB
25(OH)D3 SERPL-MCNC: 46 NG/ML (ref 30–100)
CHOLEST SERPL-MCNC: 262 MG/DL
HDLC SERPL-MCNC: 37 MG/DL
LDLC SERPL CALC-MCNC: 202 MG/DL (ref 0–100)
NONHDLC SERPL-MCNC: 225 MG/DL
TRIGL SERPL-MCNC: 115 MG/DL

## 2023-05-15 RX ORDER — DICYCLOMINE HYDROCHLORIDE 10 MG/1
CAPSULE ORAL
Qty: 120 CAPSULE | Refills: 0 | Status: SHIPPED | OUTPATIENT
Start: 2023-05-15

## 2023-06-13 DIAGNOSIS — K31.84 GASTROPARESIS: ICD-10-CM

## 2023-06-13 RX ORDER — DICYCLOMINE HYDROCHLORIDE 10 MG/1
CAPSULE ORAL
Qty: 120 CAPSULE | Refills: 0 | Status: SHIPPED | OUTPATIENT
Start: 2023-06-13

## 2023-07-06 ENCOUNTER — TELEPHONE (OUTPATIENT)
Dept: GASTROENTEROLOGY | Facility: CLINIC | Age: 68
End: 2023-07-06

## 2023-07-07 PROBLEM — Z00.00 ENCOUNTER FOR ANNUAL WELLNESS EXAM IN MEDICARE PATIENT: Status: RESOLVED | Noted: 2021-05-19 | Resolved: 2023-07-07

## 2023-07-10 ENCOUNTER — OFFICE VISIT (OUTPATIENT)
Dept: GASTROENTEROLOGY | Facility: CLINIC | Age: 68
End: 2023-07-10
Payer: COMMERCIAL

## 2023-07-10 VITALS
DIASTOLIC BLOOD PRESSURE: 88 MMHG | WEIGHT: 147.6 LBS | TEMPERATURE: 98.8 F | BODY MASS INDEX: 24.59 KG/M2 | SYSTOLIC BLOOD PRESSURE: 128 MMHG | HEIGHT: 65 IN

## 2023-07-10 DIAGNOSIS — K21.9 GASTROESOPHAGEAL REFLUX DISEASE, UNSPECIFIED WHETHER ESOPHAGITIS PRESENT: ICD-10-CM

## 2023-07-10 DIAGNOSIS — K31.84 GASTROPARESIS: Primary | ICD-10-CM

## 2023-07-10 PROCEDURE — 99214 OFFICE O/P EST MOD 30 MIN: CPT | Performed by: INTERNAL MEDICINE

## 2023-07-10 NOTE — LETTER
July 10, 2023     Bradley Servin MD  [address]    Patient: Yanira Baker   YOB: 1955   Date of Visit: 7/10/2023       Dear Dr. Marichuy Jacobs:    Thank you for referring Yanira Baker to me for evaluation. Below are my notes for this consultation. If you have questions, please do not hesitate to call me. I look forward to following your patient along with you. Sincerely,        Román Patton MD        CC: No Recipients    Román Patton MD  7/10/2023 11:11 AM  Incomplete  [facility] - Outpatient Follow-up Note  Yanira Baker 78 y.o. female MRN: [id number]  Encounter: [id number]          ASSESSMENT AND PLAN:      1. Gastroparesis  2.  GERD  Patient presenting currently with symptoms of gastroparesis and GERD ongoing for years  Her main symptoms are abdominal pain, bloating and early satiety  Etiology of gastroparesis is probably idiopathic, she is not diabetic or is on opiates  She is currently on a modified diet and her weight has been stable  GCSI 21  Gastric emptying study has been performed twice, in 2017 and last month, results were personally reviewed and interpreted, and showed half emptying time was 203 minutes in 2017, 171 minutes last month, gastric emptying at 4 hours was 60% in 2017 and 72% last month, delayed gastric emptying on both studies  Underwent recent EGD by me, possible Salazar's esophagus was seen but biopsies were normal, duodenal nodule was seen and showed gastric metaplasia on pathology  Recent blood work from April showed normal hemoglobin and platelets, normal BMP and LFTs  We had an extensive discussion about options for treatment including medication, endoscopic treatment including Botox injection, per oral endoscopic myotomy, patient was also made aware that surgery and gastric pacemaker are possible option for treatment  We discussed that Botox effects are usually short lived and can make further intervention such as G-POEM more difficult  The patient is indecisive about which treatment to pursue, patient was referred to see Dr. Marichuy Jacobs to see if she is a candidate for domperidone, most recent EKG showed normal QTc, although this was performed about 5 years ago, new EKG was ordered  If she is not a candidate for medication we will see back in the office to discuss G-POEM  Plan was discussed with Dr. Marichuy Jacobs via secure messaging  ______________________________________________________________________    SUBJECTIVE:  Patient seen and examined, she is a 15-year-old female patient with history of gastroparesis, underwent recent EGD, she persists complaining about abdominal pain and heartburn, otherwise denies any recent events, currently is tolerating PO route, denies nausea or vomiting, is passing flatus and having bowel movements, no recent weight loss      REVIEW OF SYSTEMS IS OTHERWISE NEGATIVE.       Historical Information   Past Medical History:   Diagnosis Date   • Acid reflux disease    • Anemia    • Anxiety    • Cirrhosis ([address])    • Depression    • Fibromyalgia    • Fibromyalgia    • Gastritis    • GERD (gastroesophageal reflux disease)    • High blood pressure    • Hyperlipidemia    • Hypertension    • Rheumatoid arthritis ([address])    • Urinary tract infection      Past Surgical History:   Procedure Laterality Date   • BREAST BIOPSY Right     2011   • CHOLECYSTECTOMY     • COLONOSCOPY      Fiberoptic   • HYSTERECTOMY     • UPPER GASTROINTESTINAL ENDOSCOPY      therapeutic     Social History   Social History     Substance and Sexual Activity   Alcohol Use No     Social History     Substance and Sexual Activity   Drug Use No     Social History     Tobacco Use   Smoking Status Never   Smokeless Tobacco Never   Tobacco Comments    Denied Tobacco Use     Family History   Problem Relation Age of Onset   • Heart attack Mother    • Heart disease Mother    • Hypertension Mother High Blood Prssure   • Hyperlipidemia Mother         High Cholesterol   • Stroke Mother    • Breast cancer Sister    • Depression Sister    • Other Sister         Thyroid Cyst   • Diabetes Brother    • Ovarian cancer Sister    • Stroke Grandchild    • Cholelithiasis Family    • Depression Family    • Gallbladder disease Family        Meds/Allergies       Current Outpatient Medications:   •  calcium carbonate (OS-MOLLY) 600 MG tablet  •  Cholecalciferol (RA Vitamin D-3) 25 MCG (1000 UT) tablet  •  dicyclomine (BENTYL) 10 mg capsule  •  esomeprazole (NexIUM) 40 MG capsule  •  rosuvastatin (CRESTOR) 10 MG tablet  •  scopolamine (TRANSDERM-SCOP) 1 mg/3 days TD 72 hr patch  •  betamethasone, augmented, (DIPROLENE) 0.05 % ointment  •  famotidine (PEPCID) 20 mg tablet  •  hydrocortisone (ANUSOL-HC) 2.5 % rectal cream  •  lidocaine (XYLOCAINE) 5 % ointment    No Known Allergies        Objective     Blood pressure 128/88, temperature 98.8 °F (37.1 °C), temperature source Tympanic, height 5' 5" (1.651 m), weight 67 kg (147 lb 9.6 oz). Body mass index is 24.56 kg/m². PHYSICAL EXAM:      General Appearance:   Alert, cooperative, no distress   HEENT:   Normocephalic, atraumatic, anicteric. Neck:  Supple, symmetrical, trachea midline   Lungs:   Clear to auscultation bilaterally; no rales, rhonchi or wheezing; respirations unlabored    Heart[de-identified]   Regular rate and rhythm; no murmur, rub, or gallop. Abdomen:   Soft, non-tender, non-distended; normal bowel sounds; no masses, no organomegaly    Genitalia:   Deferred    Rectal:   Deferred    Extremities:  No edema    Lymph nodes:  No palpable cervical lymphadenopathy    Skin:  No jaundice, rashes, or lesions          Lab Results:   No visits with results within 1 Day(s) from this visit.    Latest known visit with results is:   Appointment on 05/15/2023   Component Date Value   • Cholesterol 05/15/2023 262 (H)    • Triglycerides 05/15/2023 115    • HDL, Direct 05/15/2023 37 (L)    • LDL Calculated 05/15/2023 202 (H)    • Non-HDL-Chol (CHOL-HDL) 05/15/2023 225    • Vit D, 25-Hydroxy 05/15/2023 46.0          Radiology Results:   No results found.

## 2023-07-10 NOTE — PROGRESS NOTES
[facility] - Outpatient Follow-up Note  Mohsen Garland 78 y.o. female MRN: [id number]  Encounter: [id number]          ASSESSMENT AND PLAN:      1. Gastroparesis  2.  GERD  Patient presenting currently with symptoms of gastroparesis and GERD ongoing for years  Her main symptoms are abdominal pain, bloating and early satiety  Etiology of gastroparesis is probably idiopathic, she is not diabetic or is on opiates  She is currently on a modified diet and her weight has been stable  GCSI 21  Gastric emptying study has been performed twice, in 2017 and last month, results were personally reviewed and interpreted, and showed half emptying time was 203 minutes in 2017, 171 minutes last month, gastric emptying at 4 hours was 60% in 2017 and 72% last month, delayed gastric emptying on both studies  Underwent recent EGD by me, possible Salazar's esophagus was seen but biopsies were normal, duodenal nodule was seen and showed gastric metaplasia on pathology  Recent blood work from April showed normal hemoglobin and platelets, normal BMP and LFTs  We had an extensive discussion about options for treatment including medication, endoscopic treatment including Botox injection, per oral endoscopic myotomy, patient was also made aware that surgery and gastric pacemaker are possible option for treatment  We discussed that Botox effects are usually short lived and can make further intervention such as G-POEM more difficult  The patient is indecisive about which treatment to pursue, patient was referred to see Dr. Chiki Hall to see if she is a candidate for domperidone, most recent EKG showed normal QTc, although this was performed about 5 years ago, new EKG was ordered  If she is not a candidate for medication we will see back in the office to discuss G-POEM  Plan was discussed with Dr. Chiki Hall via secure messaging  ______________________________________________________________________    SUBJECTIVE:  Patient seen and examined, she is a 72-year-old female patient with history of gastroparesis, underwent recent EGD, she persists complaining about abdominal pain and heartburn, otherwise denies any recent events, currently is tolerating PO route, denies nausea or vomiting, is passing flatus and having bowel movements, no recent weight loss      REVIEW OF SYSTEMS IS OTHERWISE NEGATIVE.       Historical Information   Past Medical History:   Diagnosis Date   • Acid reflux disease    • Anemia    • Anxiety    • Cirrhosis ([address])    • Depression    • Fibromyalgia    • Fibromyalgia    • Gastritis    • GERD (gastroesophageal reflux disease)    • High blood pressure    • Hyperlipidemia    • Hypertension    • Rheumatoid arthritis ([address])    • Urinary tract infection      Past Surgical History:   Procedure Laterality Date   • BREAST BIOPSY Right     2011   • CHOLECYSTECTOMY     • COLONOSCOPY      Fiberoptic   • HYSTERECTOMY     • UPPER GASTROINTESTINAL ENDOSCOPY      therapeutic     Social History   Social History     Substance and Sexual Activity   Alcohol Use No     Social History     Substance and Sexual Activity   Drug Use No     Social History     Tobacco Use   Smoking Status Never   Smokeless Tobacco Never   Tobacco Comments    Denied Tobacco Use     Family History   Problem Relation Age of Onset   • Heart attack Mother    • Heart disease Mother    • Hypertension Mother         High Blood Prssure   • Hyperlipidemia Mother         High Cholesterol   • Stroke Mother    • Breast cancer Sister    • Depression Sister    • Other Sister         Thyroid Cyst   • Diabetes Brother    • Ovarian cancer Sister    • Stroke Grandchild    • Cholelithiasis Family    • Depression Family    • Gallbladder disease Family        Meds/Allergies       Current Outpatient Medications:   •  calcium carbonate (OS-MOLLY) 600 MG tablet  •  Cholecalciferol (RA Vitamin D-3) 25 MCG (1000 UT) tablet  •  dicyclomine (BENTYL) 10 mg capsule  •  esomeprazole (NexIUM) 40 MG capsule  •  rosuvastatin (CRESTOR) 10 MG tablet  •  scopolamine (TRANSDERM-SCOP) 1 mg/3 days TD 72 hr patch  •  betamethasone, augmented, (DIPROLENE) 0.05 % ointment  •  famotidine (PEPCID) 20 mg tablet  •  hydrocortisone (ANUSOL-HC) 2.5 % rectal cream  •  lidocaine (XYLOCAINE) 5 % ointment    No Known Allergies        Objective     Blood pressure 128/88, temperature 98.8 °F (37.1 °C), temperature source Tympanic, height 5' 5" (1.651 m), weight 67 kg (147 lb 9.6 oz). Body mass index is 24.56 kg/m². PHYSICAL EXAM:      General Appearance:   Alert, cooperative, no distress   HEENT:   Normocephalic, atraumatic, anicteric. Neck:  Supple, symmetrical, trachea midline   Lungs:   Clear to auscultation bilaterally; no rales, rhonchi or wheezing; respirations unlabored    Heart[de-identified]   Regular rate and rhythm; no murmur, rub, or gallop. Abdomen:   Soft, non-tender, non-distended; normal bowel sounds; no masses, no organomegaly    Genitalia:   Deferred    Rectal:   Deferred    Extremities:  No edema    Lymph nodes:  No palpable cervical lymphadenopathy    Skin:  No jaundice, rashes, or lesions          Lab Results:   No visits with results within 1 Day(s) from this visit. Latest known visit with results is:   Appointment on 05/15/2023   Component Date Value   • Cholesterol 05/15/2023 262 (H)    • Triglycerides 05/15/2023 115    • HDL, Direct 05/15/2023 37 (L)    • LDL Calculated 05/15/2023 202 (H)    • Non-HDL-Chol (CHOL-HDL) 05/15/2023 225    • Vit D, 25-Hydroxy 05/15/2023 46.0          Radiology Results:   No results found.

## 2023-07-18 DIAGNOSIS — K31.84 GASTROPARESIS: ICD-10-CM

## 2023-07-18 RX ORDER — DICYCLOMINE HYDROCHLORIDE 10 MG/1
CAPSULE ORAL
Qty: 120 CAPSULE | Refills: 0 | Status: SHIPPED | OUTPATIENT
Start: 2023-07-18

## 2023-08-11 ENCOUNTER — OFFICE VISIT (OUTPATIENT)
Dept: LAB | Facility: CLINIC | Age: 68
End: 2023-08-11
Payer: COMMERCIAL

## 2023-08-11 DIAGNOSIS — K31.84 GASTROPARESIS: ICD-10-CM

## 2023-08-11 LAB
ATRIAL RATE: 81 BPM
P AXIS: 47 DEGREES
PR INTERVAL: 140 MS
QRS AXIS: -19 DEGREES
QRSD INTERVAL: 76 MS
QT INTERVAL: 378 MS
QTC INTERVAL: 439 MS
T WAVE AXIS: 43 DEGREES
VENTRICULAR RATE: 81 BPM

## 2023-08-11 PROCEDURE — 93005 ELECTROCARDIOGRAM TRACING: CPT

## 2023-08-11 PROCEDURE — 93010 ELECTROCARDIOGRAM REPORT: CPT | Performed by: INTERNAL MEDICINE

## 2023-08-18 DIAGNOSIS — K31.84 GASTROPARESIS: ICD-10-CM

## 2023-08-18 RX ORDER — DICYCLOMINE HYDROCHLORIDE 10 MG/1
CAPSULE ORAL
Qty: 120 CAPSULE | Refills: 3 | Status: SHIPPED | OUTPATIENT
Start: 2023-08-18

## 2023-09-05 ENCOUNTER — OFFICE VISIT (OUTPATIENT)
Dept: GASTROENTEROLOGY | Facility: CLINIC | Age: 68
End: 2023-09-05
Payer: COMMERCIAL

## 2023-09-05 VITALS
BODY MASS INDEX: 24.66 KG/M2 | DIASTOLIC BLOOD PRESSURE: 76 MMHG | TEMPERATURE: 98.5 F | SYSTOLIC BLOOD PRESSURE: 122 MMHG | WEIGHT: 148 LBS | HEIGHT: 65 IN

## 2023-09-05 DIAGNOSIS — K74.5 BILIARY CIRRHOSIS (HCC): ICD-10-CM

## 2023-09-05 DIAGNOSIS — K31.84 GASTROPARESIS: Primary | ICD-10-CM

## 2023-09-05 DIAGNOSIS — K21.9 GASTROESOPHAGEAL REFLUX DISEASE WITHOUT ESOPHAGITIS: ICD-10-CM

## 2023-09-05 DIAGNOSIS — K31.A0 INTESTINAL METAPLASIA OF GASTRIC MUCOSA: ICD-10-CM

## 2023-09-05 DIAGNOSIS — K76.0 FATTY LIVER DISEASE, NONALCOHOLIC: ICD-10-CM

## 2023-09-05 DIAGNOSIS — K59.01 SLOW TRANSIT CONSTIPATION: ICD-10-CM

## 2023-09-05 PROCEDURE — 99214 OFFICE O/P EST MOD 30 MIN: CPT | Performed by: INTERNAL MEDICINE

## 2023-09-05 NOTE — PROGRESS NOTES
[facility] - Outpatient Follow-up Note  Brandy Wilkins 78 y.o. female MRN: [id number]  Encounter: [id number]          ASSESSMENT AND PLAN:    1. Gastroparesis  She was diagnosed in 2017. Repeat gastric emptying study recently showed 72% emptying at the end of 4 hours. Her main symptoms are abdominal pain, bloating and early satiety. She has no nausea or vomiting. She discussed other treatment options such as G POEM with Dr. Ru Barragan recently. She could not make a decision as to what she wants. She was here today to discuss medical treatment option with domperidone. I reviewed her labs and EKG which are within normal limits. Her QTc is normal.  We discussed risk and benefits of domperidone. Benefits include improvement in abdominal pain, and early satiety. I also explained to her that domperidone is not FDA approved but can be obtained through IND. I discussed potential side effects including arrhythmia, drug interactions, liver and renal toxicities. She verbalized understanding risk and benefits but at the end of the visit she is not sure what she wants to do next. She is leaning toward proceeding with G POEM. She will go home and let us know once she makes a decision as to what to do. We discussed gastroparesis diet. I gave her handout in [place]. 2. Biliary cirrhosis ([address])  -Presumed diagnosis of primary biliary cholangitis several years ago. At some point she might have been on ursodiol. At this time she is not taking any medication. Her LFTs are completely normal.  No recent ultrasound. We will get repeat LFTs, right upper quadrant sonogram and elastography. I will also get serology for autoimmune hepatitis and PBC for further evaluation. 3. Fatty liver disease, nonalcoholic  We discussed about lifestyle change including regular exercise and weight loss  - US right upper quadrant; Future  - US elastography; Future  - Antimitochondrial antibody;  Future  - NOAH Screen w/ Reflex to Titer/Pattern; Future  - IgG, IgA, IgM; Future  - Anti-smooth muscle antibody, IgG; Future  - Comprehensive metabolic panel; Future    4. Gastroesophageal reflux disease without esophagitis  Reflux precautions. Continue Prilosec    5. Intestinal metaplasia of gastric mucosa  She needs repeat EGD for surveillance in 3 to 5 years    6. Slow transit constipation  MiraLAX as needed for constipation    ______________________________________________________________________    SUBJECTIVE: She is here to discuss about treatment of gastroparesis. Mainly discussed about options of starting domperidone. Her main symptoms are abdominal pain, bloating and early satiety  No nausea or vomiting  Mild constipation   Etiology of gastroparesis is probably idiopathic, she is not diabetic or is on opiates  She is currently on a modified diet and her weight has been stable  GCSI 21  Gastric emptying study has been performed twice, in 2017 and last month, results were personally reviewed and interpreted, and showed half emptying time was 203 minutes in 2017, 171 minutes last month, gastric emptying at 4 hours was 60% in 2017 and 72% last month, delayed gastric emptying on both studies      She was previously a patient that [address] [place] [address] -she was being treated for presumed [facility]    Prior to 2016 she used to see Dr. Corey Wolff and Jason Seth.       Historical Information   Past Medical History:   Diagnosis Date   • Acid reflux disease    • Anemia    • Anxiety    • Cirrhosis ([address])    • Depression    • Fibromyalgia    • Fibromyalgia    • Gastritis    • GERD (gastroesophageal reflux disease)    • High blood pressure    • Hyperlipidemia    • Hypertension    • Rheumatoid arthritis ([address])    • Urinary tract infection      Past Surgical History:   Procedure Laterality Date   • BREAST BIOPSY Right     2011   • CHOLECYSTECTOMY     • COLONOSCOPY      Fiberoptic   • HYSTERECTOMY     • UPPER GASTROINTESTINAL ENDOSCOPY      therapeutic     Social History   Social History     Substance and Sexual Activity   Alcohol Use No     Social History     Substance and Sexual Activity   Drug Use No     Social History     Tobacco Use   Smoking Status Never   Smokeless Tobacco Never   Tobacco Comments    Denied Tobacco Use     Family History   Problem Relation Age of Onset   • Heart attack Mother    • Heart disease Mother    • Hypertension Mother         High Blood Prssure   • Hyperlipidemia Mother         High Cholesterol   • Stroke Mother    • Breast cancer Sister    • Depression Sister    • Other Sister         Thyroid Cyst   • Diabetes Brother    • Ovarian cancer Sister    • Stroke Grandchild    • Cholelithiasis Family    • Depression Family    • Gallbladder disease Family        Meds/Allergies       Current Outpatient Medications:   •  betamethasone, augmented, (DIPROLENE) 0.05 % ointment  •  calcium carbonate (OS-MOLLY) 600 MG tablet  •  Cholecalciferol (RA Vitamin D-3) 25 MCG (1000 UT) tablet  •  dicyclomine (BENTYL) 10 mg capsule  •  esomeprazole (NexIUM) 40 MG capsule  •  famotidine (PEPCID) 20 mg tablet  •  hydrocortisone (ANUSOL-HC) 2.5 % rectal cream  •  lidocaine (XYLOCAINE) 5 % ointment  •  rosuvastatin (CRESTOR) 10 MG tablet  •  scopolamine (TRANSDERM-SCOP) 1 mg/3 days TD 72 hr patch    No Known Allergies        Objective     There were no vitals taken for this visit. There is no height or weight on file to calculate BMI. PHYSICAL EXAM:      General Appearance:   Alert, cooperative, no distress   HEENT:   Normocephalic, atraumatic, anicteric.     Neck:  Supple, symmetrical, trachea midline   Lungs:   Clear to auscultation bilaterally; no rales, rhonchi or wheezing; respirations unlabored    Heart[de-identified]   Regular rate and rhythm; no murmur, rub, or gallop.    Abdomen:   Soft, non-tender, non-distended; normal bowel sounds; no masses, no organomegaly    Genitalia:   Deferred    Rectal:   Deferred  Extremities:  No cyanosis, clubbing or edema    Pulses:  2+ and symmetric    Skin:  No jaundice, rashes, or lesions    Lymph nodes:  No palpable cervical lymphadenopathy        Lab Results:   No visits with results within 1 Day(s) from this visit. Latest known visit with results is:   Office Visit on 08/11/2023   Component Date Value   • Ventricular Rate 08/11/2023 81    • Atrial Rate 08/11/2023 81    • OR Interval 08/11/2023 140    • QRSD Interval 08/11/2023 76    • QT Interval 08/11/2023 378    • QTC Interval 08/11/2023 439    • P Axis 08/11/2023 47    • QRS Axis 08/11/2023 -19    • T Wave Axis 08/11/2023 43          Radiology Results:   No results found.

## 2023-10-02 ENCOUNTER — HOSPITAL ENCOUNTER (OUTPATIENT)
Dept: ULTRASOUND IMAGING | Facility: HOSPITAL | Age: 68
Discharge: HOME | End: 2023-10-02
Attending: INTERNAL MEDICINE
Payer: COMMERCIAL

## 2023-10-02 ENCOUNTER — APPOINTMENT (OUTPATIENT)
Dept: LAB | Facility: CLINIC | Age: 68
End: 2023-10-02
Payer: COMMERCIAL

## 2023-10-02 DIAGNOSIS — K76.0 FATTY LIVER DISEASE, NONALCOHOLIC: ICD-10-CM

## 2023-10-02 LAB
ALBUMIN SERPL BCP-MCNC: 3.9 G/DL (ref 3.5–5)
ALP SERPL-CCNC: 90 U/L (ref 34–104)
ALT SERPL W P-5'-P-CCNC: 19 U/L (ref 7–52)
ANA SER QL IA: POSITIVE
ANION GAP SERPL CALCULATED.3IONS-SCNC: 5 MMOL/L
AST SERPL W P-5'-P-CCNC: 18 U/L (ref 13–39)
BILIRUB SERPL-MCNC: 0.47 MG/DL (ref 0.2–1)
BUN SERPL-MCNC: 14 MG/DL (ref 5–25)
CALCIUM SERPL-MCNC: 9.6 MG/DL (ref 8.4–10.2)
CHLORIDE SERPL-SCNC: 105 MMOL/L (ref 96–108)
CO2 SERPL-SCNC: 29 MMOL/L (ref 21–32)
CREAT SERPL-MCNC: 0.79 MG/DL (ref 0.6–1.3)
GFR SERPL CREATININE-BSD FRML MDRD: 78 ML/MIN/1.73SQ M
GLUCOSE P FAST SERPL-MCNC: 96 MG/DL (ref 65–99)
IGA SERPL-MCNC: 428 MG/DL (ref 66–433)
IGG SERPL-MCNC: 1979 MG/DL (ref 635–1741)
IGM SERPL-MCNC: 694 MG/DL (ref 45–281)
POTASSIUM SERPL-SCNC: 4.4 MMOL/L (ref 3.5–5.3)
PROT SERPL-MCNC: 8.4 G/DL (ref 6.4–8.4)
SODIUM SERPL-SCNC: 139 MMOL/L (ref 135–147)

## 2023-10-02 PROCEDURE — 76705 ECHO EXAM OF ABDOMEN: CPT

## 2023-10-02 PROCEDURE — 80053 COMPREHEN METABOLIC PANEL: CPT

## 2023-10-02 PROCEDURE — 86039 ANTINUCLEAR ANTIBODIES (ANA): CPT

## 2023-10-02 PROCEDURE — 86015 ACTIN ANTIBODY EACH: CPT

## 2023-10-02 PROCEDURE — 82784 ASSAY IGA/IGD/IGG/IGM EACH: CPT

## 2023-10-02 PROCEDURE — 86381 MITOCHONDRIAL ANTIBODY EACH: CPT

## 2023-10-02 PROCEDURE — 76981 USE PARENCHYMA: CPT

## 2023-10-02 PROCEDURE — 36415 COLL VENOUS BLD VENIPUNCTURE: CPT

## 2023-10-02 PROCEDURE — 86038 ANTINUCLEAR ANTIBODIES: CPT

## 2023-10-03 LAB
ACTIN IGG SERPL-ACNC: 4 UNITS (ref 0–19)
MITOCHONDRIA M2 IGG SER-ACNC: 185.6 UNITS (ref 0–20)

## 2023-10-04 LAB
ANA HOMOGEN SER QL IF: NORMAL
ANA HOMOGEN TITR SER: NORMAL {TITER}

## 2023-10-23 ENCOUNTER — OFFICE VISIT (OUTPATIENT)
Dept: GASTROENTEROLOGY | Facility: CLINIC | Age: 68
End: 2023-10-23
Payer: COMMERCIAL

## 2023-10-23 VITALS
TEMPERATURE: 97.6 F | WEIGHT: 146 LBS | SYSTOLIC BLOOD PRESSURE: 120 MMHG | HEIGHT: 65 IN | DIASTOLIC BLOOD PRESSURE: 72 MMHG | BODY MASS INDEX: 24.32 KG/M2

## 2023-10-23 DIAGNOSIS — K74.3 PRIMARY BILIARY CHOLANGITIS (HCC): Primary | ICD-10-CM

## 2023-10-23 DIAGNOSIS — K21.9 GASTROESOPHAGEAL REFLUX DISEASE WITHOUT ESOPHAGITIS: ICD-10-CM

## 2023-10-23 DIAGNOSIS — K31.84 GASTROPARESIS: ICD-10-CM

## 2023-10-23 DIAGNOSIS — K76.0 FATTY LIVER DISEASE, NONALCOHOLIC: ICD-10-CM

## 2023-10-23 PROCEDURE — 99214 OFFICE O/P EST MOD 30 MIN: CPT | Performed by: INTERNAL MEDICINE

## 2023-10-23 RX ORDER — URSODIOL 300 MG/1
300 CAPSULE ORAL 2 TIMES DAILY
Qty: 60 CAPSULE | Refills: 5 | Status: SHIPPED | OUTPATIENT
Start: 2023-10-23

## 2023-10-23 NOTE — PROGRESS NOTES
[facility] - Outpatient Follow-up Note  Domi Ballesteros 78 y.o. female MRN: [id number]  Encounter: [id number]          ASSESSMENT AND PLAN:      Expand All Collapse All    [facility] - Outpatient Follow-up Note  Domi Ballesteros 78 y.o. female MRN: [id number]  Encounter: [id number]              ASSESSMENT AND PLAN:    1. Gastroparesis  She was diagnosed in 2017. Repeat gastric emptying study recently showed 72% emptying at the end of 4 hours. Her main symptoms are abdominal pain, bloating and early satiety. She has no nausea or vomiting. She discussed other treatment options such as G POEM with Dr. Rosalinda Mcfarland recently. She could not make a decision as to what she wants. She was here today to discuss medical treatment option with domperidone. I reviewed her labs and EKG which are within normal limits. Her QTc is normal.  We discussed risk and benefits of domperidone. Benefits include improvement in abdominal pain, and early satiety. I also explained to her that domperidone is not FDA approved but can be obtained through IND. I discussed potential side effects including arrhythmia, drug interactions, liver and renal toxicities. She verbalized understanding risk and benefits but at the end of the visit she is not sure what she wants to do next. She is leaning toward proceeding with G POEM. She will go home and let us know once she makes a decision as to what to do. We discussed gastroparesis diet. I gave her handout in Mauritian.    -She would like to continue dietary modification and supportive care for now. She will contact our office if she makes any further decisions     2. Biliary cirrhosis (HCC)  -Diagnosed with primary biliary cholangitis several years ago. At some point she might have been on ursodiol. At this time she is not taking any medication. Mitochondrial antibody 185.6. IgM 694.  Her LFTs are completely normal except mild fluctuation of alkaline phosphatase. Most recent alkaline phosphatase is 110. Ultrasound showed mild hepatic steatosis. No evidence of advanced fibrosis. F0-F1.    -Dexa scan scheduled for next months  -  3. Fatty liver disease, nonalcoholic  We discussed about lifestyle change including regular exercise and weight loss  Ultrasound and elastography completed as outlined above  She is vaccinated for hepatitis A  We will check hepatitis B serology vaccinate if needed. 4. Gastroesophageal reflux disease without esophagitis  Reflux precautions. Continue Prilosec     5. Intestinal metaplasia of gastric mucosa  She needs repeat EGD for surveillance in 3 to 5 years     6. Slow transit constipation  MiraLAX as needed for constipation      7. Colon cancer screening -last colonoscopy was in 2020. She had 2 subcentimeter polyps removed. Surveillance colonoscopy in 2025          Follow-up in 6 months  ______________________________________________________________________    SUBJECTIVE: 60-year-old female with gastroparesis, steatotic liver disease, primary biliary cholangitis here to discuss her blood work results. No significant change in her symptoms of gastroparesis. Overall stable    We reviewed her labs which showed positive AMA, elevated IgG      REVIEW OF SYSTEMS IS OTHERWISE NEGATIVE.       Historical Information   Past Medical History:   Diagnosis Date    Acid reflux disease     Anemia     Anxiety     Cirrhosis ([address])     Depression     Fibromyalgia     Fibromyalgia     Gastritis     GERD (gastroesophageal reflux disease)     High blood pressure     Hyperlipidemia     Hypertension     Rheumatoid arthritis ([address])     Urinary tract infection      Past Surgical History:   Procedure Laterality Date    BREAST BIOPSY Right     2011    CHOLECYSTECTOMY      COLONOSCOPY      Fiberoptic    HYSTERECTOMY      UPPER GASTROINTESTINAL ENDOSCOPY      therapeutic     Social History   Social History     Substance and Sexual Activity   Alcohol Use No     Social History     Substance and Sexual Activity   Drug Use No     Social History     Tobacco Use   Smoking Status Never   Smokeless Tobacco Never   Tobacco Comments    Denied Tobacco Use     Family History   Problem Relation Age of Onset    Heart attack Mother     Heart disease Mother     Hypertension Mother         High Blood Prssure    Hyperlipidemia Mother         High Cholesterol    Stroke Mother     Breast cancer Sister     Depression Sister     Other Sister         Thyroid Cyst    Diabetes Brother     Ovarian cancer Sister     Stroke Grandchild     Cholelithiasis Family     Depression Family     Gallbladder disease Family        Meds/Allergies       Current Outpatient Medications:     calcium carbonate (OS-MOLLY) 600 MG tablet    Cholecalciferol (RA Vitamin D-3) 25 MCG (1000 UT) tablet    dicyclomine (BENTYL) 10 mg capsule    esomeprazole (NexIUM) 40 MG capsule    famotidine (PEPCID) 20 mg tablet    rosuvastatin (CRESTOR) 10 MG tablet    scopolamine (TRANSDERM-SCOP) 1 mg/3 days TD 72 hr patch    ursodiol (ACTIGALL) 300 mg capsule    betamethasone, augmented, (DIPROLENE) 0.05 % ointment    hydrocortisone (ANUSOL-HC) 2.5 % rectal cream    lidocaine (XYLOCAINE) 5 % ointment    No Known Allergies        Objective     Blood pressure 120/72, temperature 97.6 °F (36.4 °C), temperature source Tympanic, height 5' 5" (1.651 m), weight 66.2 kg (146 lb). Body mass index is 24.3 kg/m². PHYSICAL EXAM:      General Appearance:   Alert, cooperative, no distress   HEENT:   Normocephalic, atraumatic, anicteric. Neck:  Supple, symmetrical, trachea midline   Lungs:   Clear to auscultation bilaterally; no rales, rhonchi or wheezing; respirations unlabored    Heart[de-identified]   Regular rate and rhythm; no murmur, rub, or gallop.    Abdomen:   Soft, non-tender, non-distended; normal bowel sounds; no masses, no organomegaly    Genitalia:   Deferred    Rectal:   Deferred    Extremities:  No cyanosis, clubbing or edema    Pulses:  2+ and symmetric    Skin:  No jaundice, rashes, or lesions    Lymph nodes:  No palpable cervical lymphadenopathy        Lab Results:   No visits with results within 1 Day(s) from this visit. Latest known visit with results is:   Appointment on 10/02/2023   Component Date Value    Mitochondrial Ab 10/02/2023 185.6 (H)     NOAH 10/02/2023 Positive (A)     IGA 10/02/2023 428     IGG 10/02/2023 1,979 (H)     IGM 10/02/2023 694 (H)     Smooth Muscle Ab 10/02/2023 4     Sodium 10/02/2023 139     Potassium 10/02/2023 4.4     Chloride 10/02/2023 105     CO2 10/02/2023 29     ANION GAP 10/02/2023 5     BUN 10/02/2023 14     Creatinine 10/02/2023 0.79     Glucose, Fasting 10/02/2023 96     Calcium 10/02/2023 9.6     AST 10/02/2023 18     ALT 10/02/2023 19     Alkaline Phosphatase 10/02/2023 90     Total Protein 10/02/2023 8.4     Albumin 10/02/2023 3.9     Total Bilirubin 10/02/2023 0.47     eGFR 10/02/2023 78     NOAH Titer 1 10/02/2023 Titer greater than or equal to 1280     NOAH Pattern 1 10/02/2023 Cytoplasmic (non-nuclear)          Radiology Results:   US right upper quadrant    Result Date: 10/8/2023  Narrative: RIGHT UPPER QUADRANT ULTRASOUND INDICATION:     K76.0: Fatty (change of) liver, not elsewhere classified. COMPARISON: CT 2/22/2022. TECHNIQUE:   Real-time ultrasound of the right upper quadrant was performed with a curvilinear transducer with both volumetric sweeps and still imaging techniques. FINDINGS: PANCREAS:  Visualized portions of the pancreas are within normal limits. AORTA AND IVC:  Visualized portions are normal for patient age. LIVER: Size:  Within normal range. The liver measures 15.3 cm in the midclavicular line. Contour:  Surface contour is smooth. Parenchyma: There is mild diffuse increased echogenicity with smooth echotexture, without significant beam attenuation or loss of periportal echogenicity. Most consistent with mild hepatic steatosis.  Mildly complex septated cyst in the right hepatic lobe measuring 4.3 x 4.9 x 4.9 cm. Additional small simple cyst in the right hepatic lobe measuring 0.9 x 0.9 x 0.9 cm. Limited imaging of the main portal vein shows it to be patent and hepatopetal. BILIARY: Patient has undergone cholecystectomy. No intrahepatic biliary dilatation. CBD measures 8.0 mm. No choledocholithiasis. KIDNEY: Right kidney measures 11.2 x 4.6 x 4.9 cm. Volume 131.6 mL Kidney within normal limits. ASCITES:   None. Impression: Mild hepatic steatosis. Mildly complex and simple cysts in the right hepatic lobe. Cholecystectomy. Workstation performed: [id number]     US elastography    Result Date: 10/8/2023  Narrative: ELASTOGRAPHY, LIVER ULTRASOUND INDICATION:   K76.0: Fatty (change of) liver, not elsewhere classified. COMPARISON:  None TECHNIQUE: Targeted ultrasound of the liver was performed with a curvilinear transducer on a [employer] e10 utilizing 2D SWE. A total of 10 shear wave Elastography samples were obtained. FINDINGS: Shear Wave Elastography sampling was performed to evaluate stiffness changes within the liver associated with fibrosis. E1  4.46 kPa E2  4.97 kPa E3  8.9 kPa E4  6.34 kPa E5  6.68 kPa E6  4.19 kPa E7  2.51 kPa E8  4.56 kPa E9  6.29 kPa E10 5.34 kPa E median:  5.15 kPa. The corresponding Metavir score is F0-F1(absent or mild fibrosis), range 0-8.26kPa. <1.66 m/s. IQR/median:  35.8 %. (IQR of less than 30% is considered a satisfactory data set). Note: that the stage of liver fibrosis may be overestimated by clinical conditions unrelated to fibrosis, including but not limited to, nonfasting, hepatic inflammation, vascular congestion, biliary obstruction, and infiltrative liver disease. Furthermore, in some patients with NAFLD, the cut-off values for compensated advanced chronic liver disease may be lower (7-9 kPa). In causes other than viral hepatitis and nonalcoholic fatty liver disease, the cut-off values are not well established.  When comparing measurements across time, a clinically significant change should be considered when the delta change is greater than 10%. In all these conditions, however, liver stiffness values within the normal range exclude significant liver fibrosis. Ultrasound-guided attenuation parameter (UGAP) Liver Steatosis Grading A1  0.69 dB/cm/MHz A2  0.64 dB/cm/MHz A3  0.58 dB/cm/MHz A4  0.75 dB/cm/MHz A5  0.69 dB/cm/MHz A6  0.72 dB/cm/MHz A7  0.69 dB/cm/MHz A8  0.64 dB/cm/MHz A9  0.71 dB/cm/MHz A10 0.69 dB/cm/MHz Attenuation coefficient:  0.69 dB/cm/MHz Liver steatosis grading:  S1 ( 5% - 33% steatosis) range 0.65-0.70 dB/cm/MHz IQR/Med:  7.1 % (Less than or equal to 30% is a satisfactory data set.) Reference White paper for GE ultrasound-guided attenuation parameter https://www.[employer]/. au/-/jssmedia/[email or web address]. pdf?la=en-au     Impression: Metavir score: F0 - F1, Absent or Mild Fibrosis According to the updated SRU consensus statement, a liver stiffness of 5.15 kPa, which in the absence of other known clinical signs*, rules out compensated advanced chronic liver disease (cACLD). If there are known clinical signs, may need further test for confirmation. https://pubs. rsna.org/doi/10.1148/radiol. 2544191487 Liver steatosis grading:  S1 ( 5% - 33% steatosis) Workstation performed: [id number]     Answers submitted by the patient for this visit:  Abdominal Pain Questionnaire (Submitted on 10/22/2023)  Chief Complaint: Abdominal pain  Chronicity: chronic  Onset: more than 1 year ago  Onset quality: gradual  Frequency: 2 to 4 times per day  Progression since onset: gradually worsening  Pain location: epigastric region  Pain - numeric: 8/10  Pain quality: cramping  Radiates to: periumbilical region, suprapubic region  anorexia: Yes  arthralgias: Yes  belching: No  constipation: No  diarrhea: No  dysuria: No  fever: No  flatus: Yes  frequency: No  headaches: No  hematochezia: No  hematuria: No  melena: No  myalgias: No  nausea:  No  weight loss: No  vomiting: No  Aggravated by: eating  Relieved by: nothing  Diagnostic workup: ultrasound

## 2023-10-31 ENCOUNTER — APPOINTMENT (OUTPATIENT)
Dept: LAB | Age: 68
End: 2023-10-31
Payer: COMMERCIAL

## 2023-10-31 ENCOUNTER — HOSPITAL ENCOUNTER (OUTPATIENT)
Dept: RADIOLOGY | Age: 68
Discharge: HOME | End: 2023-10-31
Payer: COMMERCIAL

## 2023-10-31 DIAGNOSIS — R79.89 LOW VITAMIN D LEVEL: ICD-10-CM

## 2023-10-31 DIAGNOSIS — M85.80 OSTEOPENIA, UNSPECIFIED LOCATION: ICD-10-CM

## 2023-10-31 DIAGNOSIS — Z78.0 POSTMENOPAUSAL: ICD-10-CM

## 2023-10-31 DIAGNOSIS — Z13.820 SCREENING FOR OSTEOPOROSIS: ICD-10-CM

## 2023-10-31 DIAGNOSIS — K74.3 PRIMARY BILIARY CHOLANGITIS (HCC): ICD-10-CM

## 2023-10-31 DIAGNOSIS — I10 PRIMARY HYPERTENSION: ICD-10-CM

## 2023-10-31 LAB
25(OH)D3 SERPL-MCNC: 50.6 NG/ML (ref 30–100)
ANION GAP SERPL CALCULATED.3IONS-SCNC: 7 MMOL/L
BUN SERPL-MCNC: 17 MG/DL (ref 5–25)
CALCIUM SERPL-MCNC: 9.5 MG/DL (ref 8.4–10.2)
CHLORIDE SERPL-SCNC: 104 MMOL/L (ref 96–108)
CO2 SERPL-SCNC: 28 MMOL/L (ref 21–32)
CREAT SERPL-MCNC: 0.68 MG/DL (ref 0.6–1.3)
GFR SERPL CREATININE-BSD FRML MDRD: 91 ML/MIN/1.73SQ M
GLUCOSE P FAST SERPL-MCNC: 185 MG/DL (ref 65–99)
HBV SURFACE AB SER-ACNC: <3 MIU/ML
POTASSIUM SERPL-SCNC: 3.7 MMOL/L (ref 3.5–5.3)
SODIUM SERPL-SCNC: 139 MMOL/L (ref 135–147)

## 2023-10-31 PROCEDURE — 82306 VITAMIN D 25 HYDROXY: CPT

## 2023-10-31 PROCEDURE — 77080 DXA BONE DENSITY AXIAL: CPT

## 2023-10-31 PROCEDURE — 36415 COLL VENOUS BLD VENIPUNCTURE: CPT

## 2023-10-31 PROCEDURE — 86706 HEP B SURFACE ANTIBODY: CPT

## 2023-10-31 PROCEDURE — 80048 BASIC METABOLIC PNL TOTAL CA: CPT

## 2023-11-17 ENCOUNTER — APPOINTMENT (OUTPATIENT)
Dept: LAB | Facility: CLINIC | Age: 68
End: 2023-11-17
Payer: COMMERCIAL

## 2023-11-17 DIAGNOSIS — E78.2 MODERATE MIXED HYPERLIPIDEMIA NOT REQUIRING STATIN THERAPY: ICD-10-CM

## 2023-11-17 LAB
CHOLEST SERPL-MCNC: 130 MG/DL
HDLC SERPL-MCNC: 33 MG/DL
LDLC SERPL CALC-MCNC: 76 MG/DL (ref 0–100)
NONHDLC SERPL-MCNC: 97 MG/DL
TRIGL SERPL-MCNC: 104 MG/DL

## 2023-11-17 PROCEDURE — 36415 COLL VENOUS BLD VENIPUNCTURE: CPT

## 2023-11-17 PROCEDURE — 80061 LIPID PANEL: CPT

## 2023-12-05 ENCOUNTER — TELEPHONE (OUTPATIENT)
Dept: INTERNAL MEDICINE CLINIC | Facility: CLINIC | Age: 68
End: 2023-12-05

## 2023-12-05 NOTE — TELEPHONE ENCOUNTER
Juancarlos Fonseca called asking if ximena can come in for a hepatitis b vaccine to protect her liver. Dr. My Javed from  sent patient a [employer] message. She is looking to come in around 9 or 930 next Monday if ok to have.

## 2023-12-08 DIAGNOSIS — K31.84 GASTROPARESIS: ICD-10-CM

## 2023-12-08 RX ORDER — DICYCLOMINE HYDROCHLORIDE 10 MG/1
CAPSULE ORAL
Qty: 120 CAPSULE | Refills: 1 | Status: SHIPPED | OUTPATIENT
Start: 2023-12-08

## 2023-12-11 ENCOUNTER — OFFICE VISIT (OUTPATIENT)
Dept: GASTROENTEROLOGY | Facility: CLINIC | Age: 68
End: 2023-12-11
Payer: COMMERCIAL

## 2023-12-11 ENCOUNTER — CLINICAL SUPPORT (OUTPATIENT)
Dept: FAMILY MEDICINE CLINIC | Facility: CLINIC | Age: 68
End: 2023-12-11
Payer: COMMERCIAL

## 2023-12-11 VITALS
WEIGHT: 144.4 LBS | DIASTOLIC BLOOD PRESSURE: 78 MMHG | SYSTOLIC BLOOD PRESSURE: 122 MMHG | HEIGHT: 65 IN | TEMPERATURE: 97.1 F | BODY MASS INDEX: 24.06 KG/M2

## 2023-12-11 DIAGNOSIS — K21.9 GASTROESOPHAGEAL REFLUX DISEASE, UNSPECIFIED WHETHER ESOPHAGITIS PRESENT: Primary | ICD-10-CM

## 2023-12-11 DIAGNOSIS — Z23 ENCOUNTER FOR IMMUNIZATION: Primary | ICD-10-CM

## 2023-12-11 DIAGNOSIS — K31.84 GASTROPARESIS: ICD-10-CM

## 2023-12-11 PROCEDURE — G0010 ADMIN HEPATITIS B VACCINE: HCPCS | Performed by: FAMILY MEDICINE

## 2023-12-11 PROCEDURE — 90746 HEPB VACCINE 3 DOSE ADULT IM: CPT | Performed by: FAMILY MEDICINE

## 2023-12-11 PROCEDURE — 99213 OFFICE O/P EST LOW 20 MIN: CPT | Performed by: INTERNAL MEDICINE

## 2023-12-11 NOTE — PROGRESS NOTES
[facility] - Outpatient Follow-up Note  Hardy Medel 78 y.o. female MRN: [id number]  Encounter: [id number]          ASSESSMENT AND PLAN:      1. Gastroesophageal reflux disease, unspecified whether esophagitis present  2.  Gastroparesis  Note from 7/10/23  "Patient presenting currently with symptoms of gastroparesis and GERD ongoing for years  Her main symptoms are abdominal pain, bloating and early satiety  Etiology of gastroparesis is probably idiopathic, she is not diabetic or is on opiates  She is currently on a modified diet and her weight has been stable  GCSI 21  Gastric emptying study has been performed twice, in 2017 and last month, results were personally reviewed and interpreted, and showed half emptying time was 203 minutes in 2017, 171 minutes last month, gastric emptying at 4 hours was 60% in 2017 and 72% last month, delayed gastric emptying on both studies  Underwent recent EGD by me, possible Salazar's esophagus was seen but biopsies were normal, duodenal nodule was seen and showed gastric metaplasia on pathology  Recent blood work from April showed normal hemoglobin and platelets, normal BMP and LFTs  We had an extensive discussion about options for treatment including medication, endoscopic treatment including Botox injection, per oral endoscopic myotomy, patient was also made aware that surgery and gastric pacemaker are possible option for treatment  We discussed that Botox effects are usually short lived and can make further intervention such as G-POEM more difficult  The patient is indecisive about which treatment to pursue, patient was referred to see Dr. Orly Ross to see if she is a candidate for domperidone, most recent EKG showed normal QTc, although this was performed about 5 years ago, new EKG was ordered  If she is not a candidate for medication we will see back in the office to discuss G-POEM  Plan was discussed with Dr. Orly Ross via secure messaging"    Interval note  Patient recently saw Dr. Desmond Hatch in October to discuss medical treatment of gastroparesis  QTC was found to be normal, they discussed risk and benefits of domperidone but the patient decided against the medication and she came to me to discuss further treatment  She is still not interested in G-POEM either, she is not interested in other procedures  We will continue current symptomatic treatment with Bentyl and dietary modifications  Follow-up as needed  ______________________________________________________________________    SUBJECTIVE:  Patient seen and examined, she is a 81-year-old female patient with past medical history significant for gastroparesis, she just went about abdominal pain that is present, otherwise she denies any recent events, currently is tolerating PO route, denies nausea or vomiting, is passing flatus and having bowel movements, no recent weight loss       REVIEW OF SYSTEMS IS OTHERWISE NEGATIVE.       Historical Information   Past Medical History:   Diagnosis Date    Acid reflux disease     Anemia     Anxiety     Cirrhosis (HCC)     Depression     Fibromyalgia     Fibromyalgia     Gastritis     GERD (gastroesophageal reflux disease)     High blood pressure     Hyperlipidemia     Hypertension     Rheumatoid arthritis ([address])     Urinary tract infection      Past Surgical History:   Procedure Laterality Date    BREAST BIOPSY Right     2011    CHOLECYSTECTOMY      COLONOSCOPY      Fiberoptic    HYSTERECTOMY      UPPER GASTROINTESTINAL ENDOSCOPY      therapeutic     Social History   Social History     Substance and Sexual Activity   Alcohol Use No     Social History     Substance and Sexual Activity   Drug Use No     Social History     Tobacco Use   Smoking Status Never   Smokeless Tobacco Never   Tobacco Comments    Denied Tobacco Use     Family History   Problem Relation Age of Onset    Heart attack Mother     Heart disease Mother     Hypertension Mother         High Blood Prssure    Hyperlipidemia Mother         High Cholesterol    Stroke Mother     Breast cancer Sister     Depression Sister     Other Sister         Thyroid Cyst    Diabetes Brother     Ovarian cancer Sister     Stroke Grandchild     Cholelithiasis Family     Depression Family     Gallbladder disease Family        Meds/Allergies       Current Outpatient Medications:     calcium carbonate (OS-MOLLY) 600 MG tablet    Cholecalciferol (RA Vitamin D-3) 25 MCG (1000 UT) tablet    dicyclomine (BENTYL) 10 mg capsule    esomeprazole (NexIUM) 40 MG capsule    famotidine (PEPCID) 20 mg tablet    rosuvastatin (CRESTOR) 10 MG tablet    scopolamine (TRANSDERM-SCOP) 1 mg/3 days TD 72 hr patch    betamethasone, augmented, (DIPROLENE) 0.05 % ointment    hydrocortisone (ANUSOL-HC) 2.5 % rectal cream    lidocaine (XYLOCAINE) 5 % ointment    ursodiol (ACTIGALL) 300 mg capsule    No Known Allergies        Objective     Blood pressure 122/78, temperature (!) 97.1 °F (36.2 °C), temperature source Tympanic, height 5' 5" (1.651 m), weight 65.5 kg (144 lb 6.4 oz). Body mass index is 24.03 kg/m². PHYSICAL EXAM:      General Appearance:   Alert, cooperative, no distress   HEENT:   Normocephalic, atraumatic, anicteric. Neck:  Supple, symmetrical, trachea midline   Lungs:   Clear to auscultation bilaterally; no rales, rhonchi or wheezing; respirations unlabored    Heart[de-identified]   Regular rate and rhythm; no murmur, rub, or gallop. Abdomen:   Soft, non-tender, non-distended; normal bowel sounds; no masses, no organomegaly    Genitalia:   Deferred    Rectal:   Deferred    Extremities:  No edema    Lymph nodes:  No palpable cervical lymphadenopathy    Skin:  No jaundice, rashes, or lesions          Lab Results:   No visits with results within 1 Day(s) from this visit.    Latest known visit with results is:   Appointment on 11/17/2023   Component Date Value    Cholesterol 11/17/2023 130     Triglycerides 11/17/2023 104     HDL, Direct 11/17/2023 33 (L)     LDL Calculated 11/17/2023 76     Non-HDL-Chol (CHOL-HDL) 11/17/2023 97          Radiology Results:   Mammo screening bilateral w 3d & cad    Result Date: 11/24/2023  Narrative: This result has an attachment that is not available. HISTORY: Patient is 78years old and is seen for a screening mammogram of both breasts. No relevant medical history has been documented for this patient. Surgical history includes breast biopsy. Family medical history includes breast cancer in sister. No relevant hormone history has been documented for this patient. FILMS COMPARED: The present examination has been compared to prior imaging studies. MAMMOGRAM FINDINGS: A minimum of two views were obtained. 3D tomosynthesis was performed. Computer-aided detection was utilized by the radiologist in the interpretation of this examination. The breasts have scattered areas of fibroglandular density. No suspicious masses, calcifications or other abnormalities are seen. Impression: Impression: There is no mammographic evidence of malignancy. BI-RADS Category:  1 - Negative Follow Up Mamm in 1 Yr. is recommended. 5 Year Tyrer-Cuzick: 3.07 % 10 Year Tyrer-Cuzick: 5.77 % Lifetime Tyrer-Cuzick: 11.35 % In patients with a documented history of breast cancer a risk score will not be calculated. Based on the information provided by the patient, risk scores for breast cancer for 5 years, 10 years and lifetime was calculated using both breast density and family history. Patients should consult with their referring providers regarding the significance of the score, potential need for additional risk assessment and supplemental screening including whole breast ultrasound and MRI.  Workstation: [id number] Mirvaso Counseling: Mirvaso is a topical medication which can decrease superficial blood flow where applied. Side effects are uncommon and include stinging, redness and allergic reactions.

## 2023-12-26 DIAGNOSIS — M35.00 SJOGREN'S SYNDROME, WITH UNSPECIFIED ORGAN INVOLVEMENT (HCC): ICD-10-CM

## 2023-12-26 DIAGNOSIS — E78.5 HYPERLIPIDEMIA, UNSPECIFIED HYPERLIPIDEMIA TYPE: ICD-10-CM

## 2023-12-26 RX ORDER — ROSUVASTATIN CALCIUM 10 MG/1
10 TABLET, COATED ORAL DAILY
Qty: 100 TABLET | Refills: 1 | Status: SHIPPED | OUTPATIENT
Start: 2023-12-26

## 2024-01-03 ENCOUNTER — OFFICE VISIT (OUTPATIENT)
Dept: PLASTIC SURGERY | Facility: CLINIC | Age: 69
End: 2024-01-03

## 2024-01-03 VITALS
TEMPERATURE: 98.2 F | BODY MASS INDEX: 23.66 KG/M2 | HEIGHT: 65 IN | HEART RATE: 76 BPM | DIASTOLIC BLOOD PRESSURE: 96 MMHG | SYSTOLIC BLOOD PRESSURE: 143 MMHG | WEIGHT: 142 LBS

## 2024-01-03 DIAGNOSIS — L98.9 SKIN LESION OF FACE: ICD-10-CM

## 2024-01-03 DIAGNOSIS — L98.9 SKIN LESION: Primary | ICD-10-CM

## 2024-01-04 PROBLEM — L98.9 SKIN LESION: Status: ACTIVE | Noted: 2024-01-04

## 2024-01-04 PROBLEM — L98.9 SKIN LESION OF FACE: Status: ACTIVE | Noted: 2024-01-04

## 2024-01-04 NOTE — PROGRESS NOTES
Biopsy    Date/Time: 1/3/2024 2:30 PM    Performed by: Elsy Youssef PA-C  Authorized by: Elsy Youssef PA-C  Universal Protocol:  Consent: Verbal consent obtained.  Risks and benefits: risks, benefits and alternatives were discussed  Consent given by: patient  Patient understanding: patient states understanding of the procedure being performed  Patient consent: the patient's understanding of the procedure matches consent given  Procedure consent: procedure consent matches procedure scheduled  Patient identity confirmed: verbally with patient    Procedure Details - Lesion Biopsy:     Body area:  Head/neck    Head/neck location:  Nose    Biopsy method: punch biopsy      Biopsy tissue type: skin    Initial size (mm):  2    Final defect size (mm):  2    Malignancy: malignancy unknown

## 2024-01-04 NOTE — PROGRESS NOTES
Assessment/Plan:    Patient is a 68 Francoise presents as a new patient to our office for evaluation of pigmented nevi of the face and back.  Patient has extensive pigmented nevi which are chronic and unchanged, she has 2 areas of contrast which are quite pruritic and she has for a biopsy today.    Both areas were cleaned, prepped and anesthetized and a punch biopsy was performed at 2 sites.  Patient will return to the office in approximately 5 to 7 days for suture removal.  We will review pathology when it has been resulted.    No problem-specific Assessment & Plan notes found for this encounter.       Diagnoses and all orders for this visit:    Skin lesion  -     Tissue Exam    Skin lesion of face  -     Tissue Exam          Subjective:      Patient ID: Felicitas Stevens is a 68 y.o. female.    HPI    Patient presents to the office today with her daughter.  They report that the patient has chronically had multiple pigmented nevi throughout her body.  There are 2 areas of note, 1 on the patient's left inferior shoulder, and 1 on the right medial canthus that has become quite pruritic.  Please see photos in media.    The following portions of the patient's history were reviewed and updated as appropriate: She  has a past medical history of Acid reflux disease, Anemia, Anxiety, Cirrhosis (HCC), Depression, Fibromyalgia, Fibromyalgia, Gastritis, GERD (gastroesophageal reflux disease), High blood pressure, Hyperlipidemia, Hypertension, Rheumatoid arthritis (HCC), and Urinary tract infection.  She   Patient Active Problem List    Diagnosis Date Noted    Skin lesion 01/04/2024    Skin lesion of face 01/04/2024    Slow transit constipation 09/05/2023    Periodontitis 06/27/2022    Fatty liver disease, nonalcoholic 08/19/2020    Memory loss 01/08/2020    Depression 10/25/2018    Low vitamin D level 10/25/2018    Ovarian cyst 10/25/2018    Osteopenia 10/25/2018    Hyperlipidemia 10/25/2018    Esophageal reflux 10/25/2018     Gastroparesis 08/22/2017    Intestinal metaplasia of gastric mucosa 11/10/2015    Diverticulosis 09/14/2015    Sjogren's syndrome (HCC) 03/11/2015    Lumbar spondylosis 03/11/2015    Dysphagia 11/25/2013    Hepatic cyst 07/22/2013    Primary biliary cholangitis (HCC) 04/23/2013    Tricuspid regurgitation 10/23/2012    Hypertension 10/23/2012     She  has a past surgical history that includes Cholecystectomy; Hysterectomy; Breast biopsy (Right); Colonoscopy; and Upper gastrointestinal endoscopy.  Her family history includes Breast cancer in her sister; Cholelithiasis in her family; Depression in her family and sister; Diabetes in her brother; Gallbladder disease in her family; Heart attack in her mother; Heart disease in her mother; Hyperlipidemia in her mother; Hypertension in her mother; Other in her sister; Ovarian cancer in her sister; Stroke in her grandchild and mother.  She  reports that she has never smoked. She has never used smokeless tobacco. She reports that she does not drink alcohol and does not use drugs.  Current Outpatient Medications   Medication Sig Dispense Refill    calcium carbonate (OS-MOLLY) 600 MG tablet Take 1 tablet (600 mg total) by mouth daily 90 tablet 1    Cholecalciferol (RA Vitamin D-3) 25 MCG (1000 UT) tablet Take 1 tablet (1,000 Units total) by mouth daily 90 tablet 1    dicyclomine (BENTYL) 10 mg capsule take 1 capsule by mouth four times a day before meals and at bedtime 120 capsule 1    esomeprazole (NexIUM) 40 MG capsule   0    famotidine (PEPCID) 20 mg tablet Take 20 mg by mouth daily at bedtime      rosuvastatin (CRESTOR) 10 MG tablet take 1 tablet by mouth once daily 100 tablet 1    scopolamine (TRANSDERM-SCOP) 1 mg/3 days TD 72 hr patch Place 1 patch on the skin over 72 hours every third day 24 patch 0    ursodiol (ACTIGALL) 300 mg capsule Take 1 capsule (300 mg total) by mouth 2 (two) times a day 60 capsule 5    betamethasone, augmented, (DIPROLENE) 0.05 % ointment Apply  "externally BID for 7 to 10 days and then taper to twice weekly as needed. (Patient not taking: Reported on 5/8/2023) 30 g 0    hydrocortisone (ANUSOL-HC) 2.5 % rectal cream Apply topically 2 (two) times a day (Patient not taking: Reported on 5/8/2023) 28 g 0    lidocaine (XYLOCAINE) 5 % ointment Apply topically as needed for mild pain (Patient not taking: Reported on 4/26/2023) 35.44 g 0     No current facility-administered medications for this visit.     Current Outpatient Medications on File Prior to Visit   Medication Sig    calcium carbonate (OS-MOLLY) 600 MG tablet Take 1 tablet (600 mg total) by mouth daily    Cholecalciferol (RA Vitamin D-3) 25 MCG (1000 UT) tablet Take 1 tablet (1,000 Units total) by mouth daily    dicyclomine (BENTYL) 10 mg capsule take 1 capsule by mouth four times a day before meals and at bedtime    esomeprazole (NexIUM) 40 MG capsule     famotidine (PEPCID) 20 mg tablet Take 20 mg by mouth daily at bedtime    rosuvastatin (CRESTOR) 10 MG tablet take 1 tablet by mouth once daily    scopolamine (TRANSDERM-SCOP) 1 mg/3 days TD 72 hr patch Place 1 patch on the skin over 72 hours every third day    ursodiol (ACTIGALL) 300 mg capsule Take 1 capsule (300 mg total) by mouth 2 (two) times a day    betamethasone, augmented, (DIPROLENE) 0.05 % ointment Apply externally BID for 7 to 10 days and then taper to twice weekly as needed. (Patient not taking: Reported on 5/8/2023)    hydrocortisone (ANUSOL-HC) 2.5 % rectal cream Apply topically 2 (two) times a day (Patient not taking: Reported on 5/8/2023)    lidocaine (XYLOCAINE) 5 % ointment Apply topically as needed for mild pain (Patient not taking: Reported on 4/26/2023)     No current facility-administered medications on file prior to visit.     She has No Known Allergies..    Review of Systems      12 point view system was completed and is negative except as per HPI.    Objective:      /96   Pulse 76   Temp 98.2 °F (36.8 °C)   Ht 5' 5\" " (1.651 m)   Wt 64.4 kg (142 lb)   LMP  (LMP Unknown)   BMI 23.63 kg/m²          Physical Exam  Vitals and nursing note reviewed.   Constitutional:       General: She is not in acute distress.     Appearance: Normal appearance. She is normal weight. She is not ill-appearing.   HENT:      Head: Normocephalic and atraumatic.      Nose: Nose normal.      Mouth/Throat:      Mouth: Mucous membranes are moist.   Eyes:      Extraocular Movements: Extraocular movements intact.      Conjunctiva/sclera: Conjunctivae normal.      Pupils: Pupils are equal, round, and reactive to light.   Neck:      Vascular: No carotid bruit.   Cardiovascular:      Rate and Rhythm: Normal rate and regular rhythm.      Pulses: Normal pulses.      Heart sounds: Normal heart sounds.   Pulmonary:      Effort: Pulmonary effort is normal. No respiratory distress.      Breath sounds: Normal breath sounds.   Abdominal:      General: Abdomen is flat.      Palpations: Abdomen is soft.   Musculoskeletal:         General: No swelling, tenderness, deformity or signs of injury. Normal range of motion.      Cervical back: Normal range of motion and neck supple. No rigidity or tenderness.      Right lower leg: No edema.      Left lower leg: No edema.   Lymphadenopathy:      Cervical: No cervical adenopathy.   Skin:     General: Skin is warm and dry.      Comments: Extensive pigmented nevi noted throughout   Neurological:      General: No focal deficit present.      Mental Status: She is alert and oriented to person, place, and time.      Cranial Nerves: No cranial nerve deficit.      Sensory: No sensory deficit.      Motor: No weakness.   Psychiatric:         Mood and Affect: Mood normal.         Behavior: Behavior normal.

## 2024-01-04 NOTE — PROGRESS NOTES
Biopsy    Date/Time: 1/3/2024 2:30 PM    Performed by: Elsy Youssef PA-C  Authorized by: Elsy Youssef PA-C  Universal Protocol:  Consent: Verbal consent obtained.  Risks and benefits: risks, benefits and alternatives were discussed  Consent given by: patient  Patient understanding: patient states understanding of the procedure being performed  Patient consent: the patient's understanding of the procedure matches consent given  Procedure consent: procedure consent matches procedure scheduled  Patient identity confirmed: verbally with patient    Procedure Details - Lesion Biopsy:     Body area:  Trunk    Trunk location:  Back    Biopsy method: punch biopsy      Biopsy tissue type: skin    Initial size (mm):  2    Final defect size (mm):  2    Malignancy: malignancy unknown

## 2024-01-10 ENCOUNTER — OFFICE VISIT (OUTPATIENT)
Dept: PLASTIC SURGERY | Facility: CLINIC | Age: 69
End: 2024-01-10
Payer: COMMERCIAL

## 2024-01-10 DIAGNOSIS — L98.9 SKIN LESION OF FACE: Primary | ICD-10-CM

## 2024-01-10 PROCEDURE — 99212 OFFICE O/P EST SF 10 MIN: CPT | Performed by: PHYSICIAN ASSISTANT

## 2024-01-10 NOTE — PROGRESS NOTES
Assessment/Plan:     Patient is a 68-year-old female with a history of multiple pigmented skin lesions throughout her face and body who is s/p punch biopsy of the right nasal bridge/ cheek and shave biopsy of the posterior shoulder on 1/3/24. Please see HPI.    Patient returns to the office today for future removal from her punch biopsy site. Pathology has not yet been resulted. We will call patient with results and plan for surgical excision as appropriate.      Diagnoses and all orders for this visit:    Skin lesion of face          Subjective:     Patient ID: Felicitas Stevens is a 68 y.o. female.    HPI    Patient reports no issues or concerns.    Review of Systems    See HPI    Objective:     Physical Exam      Incision and sutures are clean, dry and intact.

## 2024-01-16 ENCOUNTER — TELEPHONE (OUTPATIENT)
Dept: PLASTIC SURGERY | Facility: CLINIC | Age: 69
End: 2024-01-16

## 2024-01-16 NOTE — TELEPHONE ENCOUNTER
Called patient with biopsy results which were as follows:    Final Diagnosis   This case was sent for processing and evaluation to [facility]. The final diagnosis is issued below; their complete report is viewable in the attached link.     A. Right medial canthus, skin lesion biopsy:  - Verrucous Keratosis, Inflamed     B. Left shoulder, skin lesion biopsy:  - Verrucous Keratosis, Inflamed.     Recommend patient to follow up with Dermatology. Patient will follow up in our office as needed.

## 2024-02-01 DIAGNOSIS — K31.84 GASTROPARESIS: ICD-10-CM

## 2024-02-01 RX ORDER — DICYCLOMINE HYDROCHLORIDE 10 MG/1
CAPSULE ORAL
Qty: 120 CAPSULE | Refills: 5 | Status: SHIPPED | OUTPATIENT
Start: 2024-02-01

## 2024-03-06 ENCOUNTER — OFFICE VISIT (OUTPATIENT)
Dept: URGENT CARE | Age: 69
End: 2024-03-06
Payer: COMMERCIAL

## 2024-03-06 VITALS
DIASTOLIC BLOOD PRESSURE: 80 MMHG | OXYGEN SATURATION: 96 % | HEART RATE: 82 BPM | TEMPERATURE: 96.9 F | SYSTOLIC BLOOD PRESSURE: 140 MMHG | RESPIRATION RATE: 20 BRPM

## 2024-03-06 DIAGNOSIS — J02.9 SORE THROAT: Primary | ICD-10-CM

## 2024-03-06 LAB — S PYO AG THROAT QL: NEGATIVE

## 2024-03-06 PROCEDURE — 87880 STREP A ASSAY W/OPTIC: CPT

## 2024-03-06 PROCEDURE — 87070 CULTURE OTHR SPECIMN AEROBIC: CPT

## 2024-03-06 PROCEDURE — 99213 OFFICE O/P EST LOW 20 MIN: CPT

## 2024-03-06 RX ORDER — PREDNISONE 20 MG/1
20 TABLET ORAL 2 TIMES DAILY WITH MEALS
Qty: 10 TABLET | Refills: 0 | Status: SHIPPED | OUTPATIENT
Start: 2024-03-06 | End: 2024-03-11

## 2024-03-06 NOTE — PATIENT INSTRUCTIONS
Rapid strep performed in office found to be negative, throat culture results pending and [facility] be [facility] hours.  Please begin short course of steroids as directed.   May alternate Tylenol/ibuprofen as needed for fever.  May use Cepacol lozenges, Chloraseptic throat spray, warm salt water gargles and hot tea with honey as needed for sore throat.  Follow up with primary care provider within 1-2 weeks.

## 2024-03-06 NOTE — PROGRESS NOTES
[facility]        NAME: Felicitas Stevens is a 68 y.o. female  : 1955    MRN: [id number]  DATE: 2024  TIME: 6:11 PM    Assessment and Plan   Sore throat [J02.9]  1. Sore throat  POCT rapid ANTIGEN strepA    Throat culture    predniSONE 20 mg tablet      Rapid strep performed in office found to be negative, throat culture results pending and should be available in [facility] within 48 hours.  Please begin short course of steroids as directed.   May alternate Tylenol/ibuprofen as needed for fever.  May use Cepacol lozenges, Chloraseptic throat spray, warm salt water gargles and hot tea with honey as needed for sore throat.  Follow up with primary care provider within 1-2 weeks.        Patient Instructions   Pharyngitis   WHAT YOU NEED TO KNOW:   Pharyngitis, or sore throat, is inflammation of the tissues and structures in your pharynx (throat). Pharyngitis is most often caused by bacteria or a virus. Other causes include smoking, allergies, or acid reflux.  DISCHARGE INSTRUCTIONS:   Call your local emergency number (911 in the ) if:   You have trouble breathing or swallowing because your throat is swollen.      Return to the emergency department if:   You are drooling because it hurts too much to swallow.    Your fever is higher than 102?F (39?C) or lasts longer than 3 days.    You are confused.    You taste blood.    Call your doctor if:   Your throat pain gets worse.    You have a painful lump in your throat that does not go away after 5 days.    Your symptoms do not improve after 5 days.    You have questions or concerns about your condition or care.    Medicines:  Viral pharyngitis will go away on its own without treatment. Your sore throat should start to feel better in 3 to 5 days. You may need any of the following:  Antibiotics  treat a bacterial infection.    NSAIDs  help decrease swelling and pain or fever. This medicine is available with or without a doctor's order. NSAIDs can  cause stomach bleeding or kidney problems in certain people. If you take blood thinner medicine, always ask your healthcare provider if NSAIDs are safe for you. Always read the medicine label and follow directions.    Acetaminophen  decreases pain and fever. It is available without a doctor's order. Ask how much to take and how often to take it. Follow directions. Read the labels of all other medicines you are using to see if they also contain acetaminophen, or ask your doctor or pharmacist. Acetaminophen can cause liver damage if not taken correctly.    Take your medicine as directed.  Contact your healthcare provider if you think your medicine is not helping or if you have side effects. Tell your provider if you are allergic to any medicine. Keep a list of the medicines, vitamins, and herbs you take. Include the amounts, and when and why you take them. Bring the list or the pill bottles to follow-up visits. Carry your medicine list with you in case of an emergency.    Manage your symptoms:   Gargle salt water.  Mix ¼ teaspoon salt in an 8 ounce glass of warm water and gargle. Do not swallow. Salt water may help decrease swelling in your throat.    Drink liquids as directed.  You may need to drink more liquids than usual. Liquids may help soothe your throat and prevent dehydration. Ask how much liquid to drink each day and which liquids are best for you.    Use a cool mist humidifier.  This will add moisture to the air and help decrease your cough.    Soothe your throat.  Cough drops, ice, soft foods, or popsicles may help decrease throat pain.    Prevent the spread of pharyngitis:  Cover your mouth and nose when you cough or sneeze. Do not share food or drinks. Wash your hands often. Use soap and water. If soap and water are unavailable, use an alcohol-based hand .       Follow up with your doctor as directed:  Write down your questions so you remember to ask them during your visits.  © Copyright [employer]  2023 Information is for End User's use only and may not be sold, redistributed or otherwise used for commercial purposes.  The above information is an  only. It is not intended as medical advice for individual conditions or treatments. Talk to your doctor, nurse or pharmacist before following any medical regimen to see if it is safe and effective for you.    Follow up with PCP in 3-5 days.  Proceed to  ER if symptoms worsen.    Chief Complaint     Chief Complaint   Patient presents with    Sore Throat     Patient daughter had covid 2/12/24 . Did recover , but sore throat has been persistent for 3 weeks. No fevers . Taking tylenol pm to help PM . Not helping.          History of Present Illness       Sore Throat   This is a new problem. The current episode started 1 to 4 weeks ago (x 3 weeks, began initially with COVID diagnosis.). The problem has been unchanged. Neither side of throat is experiencing more pain than the other. There has been no fever. Pertinent negatives include no abdominal pain, congestion, coughing, diarrhea, drooling, ear discharge, ear pain, headaches, hoarse voice, plugged ear sensation, neck pain, shortness of breath, stridor, swollen glands, trouble swallowing or vomiting. She has had no exposure to strep or mono. She has tried acetaminophen and NSAIDs for the symptoms. The treatment provided no relief.       Review of Systems   Review of Systems   Constitutional:  Negative for fatigue and fever.   HENT:  Positive for sore throat. Negative for congestion, drooling, ear discharge, ear pain, hoarse voice, postnasal drip, rhinorrhea, sinus pressure, sinus pain, sneezing and trouble swallowing.    Eyes: Negative.  Negative for pain, discharge, redness and itching.   Respiratory: Negative.  Negative for apnea, cough, choking, chest tightness, shortness of breath, wheezing and stridor.    Cardiovascular: Negative.  Negative for chest pain and palpitations.   Gastrointestinal:  Negative.  Negative for abdominal pain, diarrhea, nausea and vomiting.   Endocrine: Negative.  Negative for polydipsia, polyphagia and polyuria.   Genitourinary: Negative.  Negative for decreased urine volume and flank pain.   Musculoskeletal: Negative.  Negative for arthralgias, back pain, myalgias, neck pain and neck stiffness.   Skin: Negative.  Negative for color change and rash.   Allergic/Immunologic: Negative.  Negative for environmental allergies.   Neurological: Negative.  Negative for dizziness, facial asymmetry, light-headedness, numbness and headaches.   Hematological: Negative.  Negative for adenopathy.   Psychiatric/Behavioral: Negative.           Current Medications       Current Outpatient Medications:     betamethasone, augmented, (DIPROLENE) 0.05 % ointment, Apply externally BID for 7 to 10 days and then taper to twice weekly as needed., Disp: 30 g, Rfl: 0    calcium carbonate (OS-MOLLY) 600 MG tablet, Take 1 tablet (600 mg total) by mouth daily, Disp: 90 tablet, Rfl: 1    Cholecalciferol (RA Vitamin D-3) 25 MCG (1000 UT) tablet, Take 1 tablet (1,000 Units total) by mouth daily, Disp: 90 tablet, Rfl: 1    dicyclomine (BENTYL) 10 mg capsule, take 1 capsule by mouth four times a day before meals and at bedtime, Disp: 120 capsule, Rfl: 5    esomeprazole (NexIUM) 40 MG capsule, , Disp: , Rfl: 0    famotidine (PEPCID) 20 mg tablet, Take 20 mg by mouth daily at bedtime, Disp: , Rfl:     hydrocortisone (ANUSOL-HC) 2.5 % rectal cream, Apply topically 2 (two) times a day, Disp: 28 g, Rfl: 0    lidocaine (XYLOCAINE) 5 % ointment, Apply topically as needed for mild pain, Disp: 35.44 g, Rfl: 0    rosuvastatin (CRESTOR) 10 MG tablet, take 1 tablet by mouth once daily, Disp: 100 tablet, Rfl: 1    ursodiol (ACTIGALL) 300 mg capsule, Take 1 capsule (300 mg total) by mouth 2 (two) times a day, Disp: 60 capsule, Rfl: 5    predniSONE 20 mg tablet, Take 1 tablet (20 mg total) by mouth 2 (two) times a day with meals for  5 days, Disp: 10 tablet, Rfl: 0    scopolamine (TRANSDERM-SCOP) 1 mg/3 days TD 72 hr patch, Place 1 patch on the skin over 72 hours every third day (Patient not taking: Reported on 3/6/2024), Disp: 24 patch, Rfl: 0    Current Allergies     Allergies as of 03/06/2024    (No Known Allergies)            The following portions of the patient's history were reviewed and updated as appropriate: allergies, current medications, past family history, past medical history, past social history, past surgical history and problem list.     Past Medical History:   Diagnosis Date    Acid reflux disease     Anemia     Anxiety     Cirrhosis (HCC)     Depression     Fibromyalgia     Fibromyalgia     Gastritis     GERD (gastroesophageal reflux disease)     High blood pressure     Hyperlipidemia     Hypertension     Rheumatoid arthritis (HCC)     Urinary tract infection        Past Surgical History:   Procedure Laterality Date    BREAST BIOPSY Right     2011    CHOLECYSTECTOMY      COLONOSCOPY      Fiberoptic    HYSTERECTOMY      UPPER GASTROINTESTINAL ENDOSCOPY      therapeutic       Family History   Problem Relation Age of Onset    Heart attack Mother     Heart disease Mother     Hypertension Mother         High Blood Prssure    Hyperlipidemia Mother         High Cholesterol    Stroke Mother     Breast cancer Sister     Depression Sister     Other Sister         Thyroid Cyst    Diabetes Brother     Ovarian cancer Sister     Stroke Grandchild     Cholelithiasis Family     Depression Family     Gallbladder disease Family          Medications have been verified.        Objective   /80   Pulse 82   Temp (!) 96.9 °F (36.1 °C) (Temporal)   Resp 20   LMP  (LMP Unknown)   SpO2 96%        Physical Exam     Physical Exam  Vitals and nursing note reviewed.   Constitutional:       General: She is not in acute distress.     Appearance: Normal appearance. She is not ill-appearing, toxic-appearing or diaphoretic.      Interventions: She  is not intubated.  HENT:      Head: Normocephalic and atraumatic.      Right Ear: Tympanic membrane and ear canal normal. No drainage, swelling or tenderness. No middle ear effusion. Tympanic membrane is not erythematous.      Left Ear: Tympanic membrane and ear canal normal. No drainage, swelling or tenderness.  No middle ear effusion. Tympanic membrane is not erythematous.      Nose: Nose normal. No congestion or rhinorrhea.      Mouth/Throat:      Mouth: Mucous membranes are moist. No oral lesions.      Pharynx: Oropharynx is clear. Uvula midline. No pharyngeal swelling, oropharyngeal exudate, posterior oropharyngeal erythema or uvula swelling.      Tonsils: No tonsillar exudate or tonsillar abscesses. 1+ on the right. 1+ on the left.   Eyes:      Extraocular Movements: Extraocular movements intact.      Conjunctiva/sclera: Conjunctivae normal.      Pupils: Pupils are equal, round, and reactive to light.   Neck:      Thyroid: No thyroid mass, thyromegaly or thyroid tenderness.   Cardiovascular:      Rate and Rhythm: Normal rate and regular rhythm.      Pulses: Normal pulses.      Heart sounds: Normal heart sounds, S1 normal and S2 normal. Heart sounds not distant. No murmur heard.     No friction rub. No gallop.   Pulmonary:      Effort: Pulmonary effort is normal. No tachypnea, bradypnea, accessory muscle usage, prolonged expiration, respiratory distress or retractions. She is not intubated.      Breath sounds: Normal breath sounds. No stridor, decreased air movement or transmitted upper airway sounds. No decreased breath sounds, wheezing, rhonchi or rales.   Abdominal:      General: Bowel sounds are normal.      Palpations: Abdomen is soft.      Tenderness: There is no abdominal tenderness. There is no guarding or rebound.   Musculoskeletal:         General: Normal range of motion.      Cervical back: Full passive range of motion without pain, normal range of motion and neck supple. No tenderness. No spinous  process tenderness or muscular tenderness. Normal range of motion.   Lymphadenopathy:      Cervical: Cervical adenopathy present.      Right cervical: Superficial cervical adenopathy present.      Left cervical: Superficial cervical adenopathy present.   Skin:     General: Skin is warm and dry.      Capillary Refill: Capillary refill takes less than 2 seconds.   Neurological:      General: No focal deficit present.      Mental Status: She is alert and oriented to person, place, and time.      Cranial Nerves: No cranial nerve deficit.   Psychiatric:         Mood and Affect: Mood normal.         Behavior: Behavior normal.

## 2024-03-08 LAB — BACTERIA THROAT CULT: NORMAL

## 2024-03-11 ENCOUNTER — CLINICAL SUPPORT (OUTPATIENT)
Dept: FAMILY MEDICINE CLINIC | Facility: CLINIC | Age: 69
End: 2024-03-11
Payer: COMMERCIAL

## 2024-03-11 DIAGNOSIS — Z23 ENCOUNTER FOR IMMUNIZATION: Primary | ICD-10-CM

## 2024-03-11 PROCEDURE — 90746 HEPB VACCINE 3 DOSE ADULT IM: CPT | Performed by: FAMILY MEDICINE

## 2024-03-11 PROCEDURE — G0010 ADMIN HEPATITIS B VACCINE: HCPCS | Performed by: FAMILY MEDICINE

## 2024-03-27 ENCOUNTER — TELEPHONE (OUTPATIENT)
Age: 69
End: 2024-03-27

## 2024-03-27 DIAGNOSIS — K21.9 GASTROESOPHAGEAL REFLUX DISEASE, UNSPECIFIED WHETHER ESOPHAGITIS PRESENT: Primary | ICD-10-CM

## 2024-03-27 RX ORDER — ESOMEPRAZOLE MAGNESIUM 40 MG/1
40 CAPSULE, DELAYED RELEASE ORAL DAILY
Qty: 30 CAPSULE | Refills: 1 | Status: SHIPPED | OUTPATIENT
Start: 2024-03-27

## 2024-03-27 NOTE — TELEPHONE ENCOUNTER
Patients GI provider:  Dr. Osorio    Number to return call: [phone]    Reason for call: Pt daughter was calling to ask that the esomeprazole 40 mg be called in to the pts pharmacy on file. Please reach out to the pts daughter when it is sent in    Scheduled procedure/appointment date if applicable: Apt/5/6/24

## 2024-03-27 NOTE — TELEPHONE ENCOUNTER
The patient has not been seen in over a year.  Will provide her a courtesy refill. Please let her daughter know this was send in and schedule an office visit

## 2024-04-24 DIAGNOSIS — K74.3 PRIMARY BILIARY CHOLANGITIS (HCC): Primary | ICD-10-CM

## 2024-04-25 ENCOUNTER — APPOINTMENT (OUTPATIENT)
Dept: LAB | Facility: CLINIC | Age: 69
End: 2024-04-25
Payer: COMMERCIAL

## 2024-04-25 DIAGNOSIS — K74.3 PRIMARY BILIARY CHOLANGITIS (HCC): ICD-10-CM

## 2024-04-25 LAB
ALBUMIN SERPL BCP-MCNC: 3.9 G/DL (ref 3.5–5)
ALP SERPL-CCNC: 90 U/L (ref 34–104)
ALT SERPL W P-5'-P-CCNC: 13 U/L (ref 7–52)
ANION GAP SERPL CALCULATED.3IONS-SCNC: 6 MMOL/L (ref 4–13)
AST SERPL W P-5'-P-CCNC: 15 U/L (ref 13–39)
BILIRUB SERPL-MCNC: 0.51 MG/DL (ref 0.2–1)
BUN SERPL-MCNC: 19 MG/DL (ref 5–25)
CALCIUM SERPL-MCNC: 9.2 MG/DL (ref 8.4–10.2)
CHLORIDE SERPL-SCNC: 108 MMOL/L (ref 96–108)
CO2 SERPL-SCNC: 26 MMOL/L (ref 21–32)
CREAT SERPL-MCNC: 0.61 MG/DL (ref 0.6–1.3)
ERYTHROCYTE [DISTWIDTH] IN BLOOD BY AUTOMATED COUNT: 13 % (ref 11.6–15.1)
GFR SERPL CREATININE-BSD FRML MDRD: 94 ML/MIN/1.73SQ M
GLUCOSE P FAST SERPL-MCNC: 89 MG/DL (ref 65–99)
HCT VFR BLD AUTO: 38.7 % (ref 34.8–46.1)
HGB BLD-MCNC: 12.3 G/DL (ref 11.5–15.4)
INR PPP: 1.11 (ref 0.84–1.19)
MCH RBC QN AUTO: 30 PG (ref 26.8–34.3)
MCHC RBC AUTO-ENTMCNC: 31.8 G/DL (ref 31.4–37.4)
MCV RBC AUTO: 94 FL (ref 82–98)
PLATELET # BLD AUTO: 329 THOUSANDS/UL (ref 149–390)
PMV BLD AUTO: 10.7 FL (ref 8.9–12.7)
POTASSIUM SERPL-SCNC: 4.2 MMOL/L (ref 3.5–5.3)
PROT SERPL-MCNC: 7.6 G/DL (ref 6.4–8.4)
PROTHROMBIN TIME: 14.2 SECONDS (ref 11.6–14.5)
RBC # BLD AUTO: 4.1 MILLION/UL (ref 3.81–5.12)
SODIUM SERPL-SCNC: 140 MMOL/L (ref 135–147)
WBC # BLD AUTO: 8.13 THOUSAND/UL (ref 4.31–10.16)

## 2024-04-25 PROCEDURE — 80053 COMPREHEN METABOLIC PANEL: CPT

## 2024-04-25 PROCEDURE — 85027 COMPLETE CBC AUTOMATED: CPT

## 2024-04-25 PROCEDURE — 85610 PROTHROMBIN TIME: CPT

## 2024-04-25 PROCEDURE — 36415 COLL VENOUS BLD VENIPUNCTURE: CPT

## 2024-05-06 ENCOUNTER — OFFICE VISIT (OUTPATIENT)
Dept: GASTROENTEROLOGY | Facility: CLINIC | Age: 69
End: 2024-05-06
Payer: COMMERCIAL

## 2024-05-06 VITALS
HEART RATE: 68 BPM | WEIGHT: 145 LBS | BODY MASS INDEX: 24.16 KG/M2 | HEIGHT: 65 IN | SYSTOLIC BLOOD PRESSURE: 118 MMHG | DIASTOLIC BLOOD PRESSURE: 74 MMHG

## 2024-05-06 DIAGNOSIS — K21.00 GASTROESOPHAGEAL REFLUX DISEASE WITH ESOPHAGITIS WITHOUT HEMORRHAGE: Primary | ICD-10-CM

## 2024-05-06 DIAGNOSIS — Z86.010 HISTORY OF COLON POLYPS: ICD-10-CM

## 2024-05-06 DIAGNOSIS — K59.01 SLOW TRANSIT CONSTIPATION: ICD-10-CM

## 2024-05-06 DIAGNOSIS — K31.84 GASTROPARESIS: ICD-10-CM

## 2024-05-06 DIAGNOSIS — K74.3 PRIMARY BILIARY CHOLANGITIS (HCC): ICD-10-CM

## 2024-05-06 DIAGNOSIS — K31.A0 INTESTINAL METAPLASIA OF GASTRIC MUCOSA: ICD-10-CM

## 2024-05-06 PROCEDURE — 99214 OFFICE O/P EST MOD 30 MIN: CPT | Performed by: PHYSICIAN ASSISTANT

## 2024-05-06 PROCEDURE — G2211 COMPLEX E/M VISIT ADD ON: HCPCS | Performed by: PHYSICIAN ASSISTANT

## 2024-05-06 RX ORDER — FAMOTIDINE 20 MG/1
20 TABLET, FILM COATED ORAL
Qty: 90 TABLET | Refills: 3 | Status: SHIPPED | OUTPATIENT
Start: 2024-05-06

## 2024-05-06 NOTE — PROGRESS NOTES
[facility] - Outpatient Follow-up Note  Felicitas Stevens 68 y.o. female MRN: [id number]  Encounter: [id number]  ASSESSMENT AND PLAN:    1. Gastroesophageal reflux disease with esophagitis without hemorrhage  2. Gastroparesis  3. Intestinal metaplasia of gastric mucosa  Patient continues with upper abdominal discomfort, heartburn, reflux for 40 years.  No new complaints today or alarming symptoms.  Discussed with patient and daughter that her symptoms are likely secondary to her known gastroparesis and reflux.  She has previously declined domperidone and procedure like G-POEM in the past.      we discussed the importance of smaller, more frequent meals.  We discussed avoidance of fiber and fat.  Handouts on low fat and low fiber diets provided in Romanian today.  Will place referral to dietitian for further assistance with gastroparesis diet.    I recommend patient continue with his omeprazole 40 mg once daily in the morning.  Encouraged patient to use famotidine 20 mg once daily at bedtime in addition to PPI, she will do this.    She underwent EGD approximately 1 year ago which showed evidence of reflux esophagitis, negative for Salazar's esophagus.  She does have a history of intestinal metaplasia of gastric mucosa.  Per last office visit it is recommended patient undergo repeat EGD in 3 to 5 years (~2609-4790).    - famotidine (PEPCID) 20 mg tablet; Take 1 tablet (20 mg total) by mouth daily at bedtime  Dispense: 90 tablet; Refill: 3  - Ambulatory Referral to Nutrition Services; Future      4. Primary biliary cholangitis (HCC)  We reviewed her recent labs including CBC, CMP, INR all of which were normal.  She is on ursodiol 300 mg twice daily.  She will continue this at this time.  Ultrasound elastography in fall 2023 revealed S1 steatosis F0-F1 liver fibrosis which is reassuring.  Daughter requests referral to hepatologist for one-time evaluation, will place referral today.    -  Ambulatory Referral to Hepatology; Future    5. Slow transit constipation  Recommend patient use MiraLAX as needed.  Discussed the importance of staying hydrated.    6. History of colon polyps  Patient will be due for repeat screening colonoscopy in 2025.      Patient was instructed to call the office with any questions, concerns, new/ worsening/ persisting GI symptoms. Advised patient go to the ER with any severe or worsening abdominal pain, fevers/ chills, intractable N/V, chest pain, SOB, dizziness, lightheadedness, feeling something stuck in esophagus that will not go down. Patient expressed understanding and is in agreement with treatment plan.     Will plan to follow up in ~ 6 months   __________________________________________________________    SUBJECTIVE:     Patient is new to me.  Patient with a past medical history of GERD, gastroparesis, history of PBC on ursodiol, Sjogren's syndrome, hyperlipidemia, depression, memory loss presents to the office today for follow-up.  Patient was last seen in the office 12/11/2023 by Dr. Jossue Byrne, previous office note was reviewed.    Patient is Vietnamese-speaking.  Daughter Caleb is present for visit today who provides Japanese translation. They decline .     Patient continues to complain of upper abdominal discomfort. Daughter reports she is feeling about the same as last visit.. This abdominal discomfort has been ongoing x 40 years. No new problems today.   There is associated heartburn and acid reflux ~ 3x/ week.   Patient takes esomeprazole 40 mg once daily.  Bms are regular overall. She has occasional constipation, uses miralax prn   Her weight is stable from last visit.  Patient denies any fevers/ chills, unintentional weight loss, decreased appetite, nausea, vomiting, change in bowel habits, diarrhea,  black or bloody stools,  dysphagia, odynophagia.   Denies chest pain, SOB    Patient has previously decided against domperidone and against  intervention like G-POEM for her gastroparesis.    Review of Systems   Constitutional:  Negative for chills and fever.   HENT:  Negative for ear pain and sore throat.    Eyes:  Negative for pain and visual disturbance.   Respiratory:  Negative for cough and shortness of breath.    Cardiovascular:  Negative for chest pain and palpitations.   Gastrointestinal:  Negative for blood in stool and vomiting.   Genitourinary:  Negative for dysuria and hematuria.   Musculoskeletal:  Negative for arthralgias and back pain.   Skin:  Negative for color change and rash.   Neurological:  Negative for seizures and syncope.   All other systems reviewed and are negative.         Historical Information   Past Medical History:   Diagnosis Date    Acid reflux disease     Anemia     Anxiety     Cirrhosis (HCC)     Depression     Fibromyalgia     Fibromyalgia     Gastritis     GERD (gastroesophageal reflux disease)     High blood pressure     Hyperlipidemia     Hypertension     Rheumatoid arthritis (HCC)     Urinary tract infection      Past Surgical History:   Procedure Laterality Date    BREAST BIOPSY Right     2011    CHOLECYSTECTOMY      COLONOSCOPY      Fiberoptic    HYSTERECTOMY      UPPER GASTROINTESTINAL ENDOSCOPY      therapeutic     Social History   Social History     Substance and Sexual Activity   Alcohol Use No     Social History     Substance and Sexual Activity   Drug Use No     Social History     Tobacco Use   Smoking Status Never   Smokeless Tobacco Never   Tobacco Comments    Denied Tobacco Use     Family History   Problem Relation Age of Onset    Heart attack Mother     Heart disease Mother     Hypertension Mother         High Blood Prssure    Hyperlipidemia Mother         High Cholesterol    Stroke Mother     Breast cancer Sister     Depression Sister     Other Sister         Thyroid Cyst    Diabetes Brother     Ovarian cancer Sister     Stroke Grandchild     Cholelithiasis Family     Depression Family     Gallbladder  disease Family        Meds/Allergies       Current Outpatient Medications:     betamethasone, augmented, (DIPROLENE) 0.05 % ointment    calcium carbonate (OS-MOLLY) 600 MG tablet    Cholecalciferol (RA Vitamin D-3) 25 MCG (1000 UT) tablet    dicyclomine (BENTYL) 10 mg capsule    esomeprazole (NexIUM) 40 MG capsule    famotidine (PEPCID) 20 mg tablet    hydrocortisone (ANUSOL-HC) 2.5 % rectal cream    lidocaine (XYLOCAINE) 5 % ointment    rosuvastatin (CRESTOR) 10 MG tablet    scopolamine (TRANSDERM-SCOP) 1 mg/3 days TD 72 hr patch    ursodiol (ACTIGALL) 300 mg capsule    No Known Allergies        Objective     Wt Readings from Last 3 Encounters:   01/03/24 64.4 kg (142 lb)   12/11/23 65.5 kg (144 lb 6.4 oz)   10/23/23 66.2 kg (146 lb)     Temp Readings from Last 3 Encounters:   03/06/24 (!) 96.9 °F (36.1 °C) (Temporal)   01/03/24 98.2 °F (36.8 °C)   12/11/23 (!) 97.1 °F (36.2 °C) (Tympanic)     BP Readings from Last 3 Encounters:   03/06/24 140/80   01/03/24 143/96   12/11/23 122/78     Pulse Readings from Last 3 Encounters:   03/06/24 82   01/03/24 76   05/08/23 82          PHYSICAL EXAM:      Physical Exam  Vitals reviewed.   Constitutional:       General: She is not in acute distress.     Appearance: She is not toxic-appearing.   HENT:      Head: Normocephalic and atraumatic.   Eyes:      Extraocular Movements: Extraocular movements intact.      Conjunctiva/sclera: Conjunctivae normal.   Cardiovascular:      Rate and Rhythm: Normal rate and regular rhythm.   Pulmonary:      Effort: Pulmonary effort is normal. No respiratory distress.      Breath sounds: Normal breath sounds.   Abdominal:      General: Bowel sounds are normal.      Palpations: Abdomen is soft.      Tenderness: There is no abdominal tenderness.   Musculoskeletal:         General: No swelling or tenderness.      Cervical back: Normal range of motion and neck supple.   Skin:     General: Skin is warm and dry.      Coloration: Skin is not jaundiced.    Neurological:      General: No focal deficit present.      Mental Status: She is alert and oriented to person, place, and time. Mental status is at baseline.   Psychiatric:         Mood and Affect: Mood normal.         Behavior: Behavior normal.         Thought Content: Thought content normal.        Lab Results:   No visits with results within 1 Day(s) from this visit.   Latest known visit with results is:   Appointment on 04/25/2024   Component Date Value    WBC 04/25/2024 8.13     RBC 04/25/2024 4.10     Hemoglobin 04/25/2024 12.3     Hematocrit 04/25/2024 38.7     MCV 04/25/2024 94     MCH 04/25/2024 30.0     MCHC 04/25/2024 31.8     RDW 04/25/2024 13.0     Platelets 04/25/2024 329     MPV 04/25/2024 10.7     Sodium 04/25/2024 140     Potassium 04/25/2024 4.2     Chloride 04/25/2024 108     CO2 04/25/2024 26     ANION GAP 04/25/2024 6     BUN 04/25/2024 19     Creatinine 04/25/2024 0.61     Glucose, Fasting 04/25/2024 89     Calcium 04/25/2024 9.2     AST 04/25/2024 15     ALT 04/25/2024 13     Alkaline Phosphatase 04/25/2024 90     Total Protein 04/25/2024 7.6     Albumin 04/25/2024 3.9     Total Bilirubin 04/25/2024 0.51     eGFR 04/25/2024 94     Protime 04/25/2024 14.2     INR 04/25/2024 1.11          Radiology Results:   Patient underwent right upper quadrant ultrasound with elastography 10/2/2023, this was reviewed, ultrasound showed mild hepatic steatosis with mildly complex and simple cysts in the right hepatic lobe.  Patient status post cholecystectomy.  Elastography showed S1 liver steatosis and F0-F1 liver fibrosis.    Patient underwent nuclear medicine gastric emptying scan 5/1/2023 which was positive for gastroparesis with a T half-time of 171 minutes    Patient underwent CT abdomen and pelvis with IV contrast 2/22/2022 for abdominal pain, this was reviewed, this showed no acute abnormality.    Prior Procedure Results/Reports   Last EGD reviewed done 4/26/2023 for GERD, gastritis, gastroparesis  and showed a 2 cm tongue of possible Salazar's esophagus (biopsy consistent with reflux esophagitis, negative for Salazar's esophagus), mild gastritis(negative for H. pylori on biopsies), duodenal polyp.  Recommended patient undergo repeat EGD in 3-5 years.    Last Colonoscopy reviewed done in 2020 at [facility] and revealed diverticulosis, 2 colon polyps which were removed, internal and external hemorrhoids.  I do not have pathology results from this procedure.  Recommended repeat screening colonoscopy in 2025.    Susana Young PA-C   Available on eTobb

## 2024-05-18 DIAGNOSIS — K21.9 GASTROESOPHAGEAL REFLUX DISEASE, UNSPECIFIED WHETHER ESOPHAGITIS PRESENT: ICD-10-CM

## 2024-05-19 RX ORDER — ESOMEPRAZOLE MAGNESIUM 40 MG/1
40 CAPSULE, DELAYED RELEASE ORAL DAILY
Qty: 30 CAPSULE | Refills: 5 | Status: SHIPPED | OUTPATIENT
Start: 2024-05-19

## 2024-05-22 ENCOUNTER — TELEPHONE (OUTPATIENT)
Age: 69
End: 2024-05-22

## 2024-05-22 ENCOUNTER — APPOINTMENT (OUTPATIENT)
Dept: LAB | Facility: CLINIC | Age: 69
End: 2024-05-22
Payer: COMMERCIAL

## 2024-05-22 DIAGNOSIS — R30.0 DYSURIA: Primary | ICD-10-CM

## 2024-05-22 DIAGNOSIS — R30.0 DYSURIA: ICD-10-CM

## 2024-05-22 LAB
BACTERIA UR QL AUTO: ABNORMAL /HPF
BILIRUB UR QL STRIP: NEGATIVE
CLARITY UR: CLEAR
COLOR UR: COLORLESS
GLUCOSE UR STRIP-MCNC: NEGATIVE MG/DL
HGB UR QL STRIP.AUTO: ABNORMAL
KETONES UR STRIP-MCNC: NEGATIVE MG/DL
LEUKOCYTE ESTERASE UR QL STRIP: ABNORMAL
NITRITE UR QL STRIP: NEGATIVE
NON-SQ EPI CELLS URNS QL MICRO: ABNORMAL /HPF
PH UR STRIP.AUTO: 7 [PH]
PROT UR STRIP-MCNC: ABNORMAL MG/DL
RBC #/AREA URNS AUTO: ABNORMAL /HPF
SP GR UR STRIP.AUTO: 1.01 (ref 1–1.03)
UROBILINOGEN UR STRIP-ACNC: <2 MG/DL
WBC #/AREA URNS AUTO: ABNORMAL /HPF

## 2024-05-22 PROCEDURE — 87186 SC STD MICRODIL/AGAR DIL: CPT

## 2024-05-22 PROCEDURE — 81001 URINALYSIS AUTO W/SCOPE: CPT

## 2024-05-22 PROCEDURE — 87086 URINE CULTURE/COLONY COUNT: CPT

## 2024-05-22 PROCEDURE — 87077 CULTURE AEROBIC IDENTIFY: CPT

## 2024-05-22 NOTE — TELEPHONE ENCOUNTER
Pt's daughter Caleb called back asking if an order for a urine test can be placed so she can take her mother to the lab today. Please call and advise.

## 2024-05-22 NOTE — TELEPHONE ENCOUNTER
Patient's daughter called, patient has difficulty using the bathroom, pain and burning when peeing. Patient would like to be seen but no available appointments until 06/04/24. Please advise and contact daughter. Call back # [phone]

## 2024-05-22 NOTE — TELEPHONE ENCOUNTER
Patient daughter called to confirm if the order had been placed.     Patient will be going to have urine test done today 05/22/2024

## 2024-05-22 NOTE — TELEPHONE ENCOUNTER
Daughter called back for results, advised her tdoctor has not reviewed them yet and we're still waiting on the urine culture results.

## 2024-05-23 ENCOUNTER — TELEPHONE (OUTPATIENT)
Dept: FAMILY MEDICINE CLINIC | Facility: CLINIC | Age: 69
End: 2024-05-23

## 2024-05-23 ENCOUNTER — TELEPHONE (OUTPATIENT)
Age: 69
End: 2024-05-23

## 2024-05-23 DIAGNOSIS — N30.00 ACUTE CYSTITIS WITHOUT HEMATURIA: Primary | ICD-10-CM

## 2024-05-23 RX ORDER — CIPROFLOXACIN 500 MG/1
500 TABLET, FILM COATED ORAL EVERY 12 HOURS SCHEDULED
Qty: 14 TABLET | Refills: 0 | Status: SHIPPED | OUTPATIENT
Start: 2024-05-23 | End: 2024-05-30

## 2024-05-23 NOTE — TELEPHONE ENCOUNTER
[facility]. Plz let her daughter know. ty

## 2024-05-23 NOTE — TELEPHONE ENCOUNTER
Patient's daughter called, she would like to know if her mother's urine test results are back. I did explain to her that you have not seen her in the office yet, she says she can schedule an appointment for tomorrow.

## 2024-05-23 NOTE — TELEPHONE ENCOUNTER
Patient's daughter would like to be contacted when results of UTI urine have been reviewed.  Please call when reviewed.

## 2024-05-24 LAB — BACTERIA UR CULT: ABNORMAL

## 2024-05-30 ENCOUNTER — TELEPHONE (OUTPATIENT)
Dept: FAMILY MEDICINE CLINIC | Facility: CLINIC | Age: 69
End: 2024-05-30

## 2024-05-30 NOTE — TELEPHONE ENCOUNTER
----- Message from Norberto Musa MD sent at 5/30/2024  9:38 AM EDT -----  Please call the patient regarding her abnormal result.  Ask for any UTI s/s now? ty

## 2024-06-06 NOTE — TELEPHONE ENCOUNTER
Pt's daughter, Otoniel called to let the doctor know that the patient has completed the antibiotics and is feeling better.

## 2024-06-19 ENCOUNTER — OFFICE VISIT (OUTPATIENT)
Dept: FAMILY MEDICINE CLINIC | Facility: CLINIC | Age: 69
End: 2024-06-19
Payer: COMMERCIAL

## 2024-06-19 VITALS
RESPIRATION RATE: 16 BRPM | HEART RATE: 90 BPM | OXYGEN SATURATION: 98 % | HEIGHT: 65 IN | DIASTOLIC BLOOD PRESSURE: 75 MMHG | SYSTOLIC BLOOD PRESSURE: 135 MMHG | TEMPERATURE: 97.7 F | WEIGHT: 149.6 LBS | BODY MASS INDEX: 24.93 KG/M2

## 2024-06-19 DIAGNOSIS — Z23 ENCOUNTER FOR IMMUNIZATION: ICD-10-CM

## 2024-06-19 DIAGNOSIS — M35.00 SJOGREN'S SYNDROME, WITH UNSPECIFIED ORGAN INVOLVEMENT (HCC): ICD-10-CM

## 2024-06-19 DIAGNOSIS — E04.1 NODULAR THYROID DISEASE: ICD-10-CM

## 2024-06-19 DIAGNOSIS — M81.0 AGE-RELATED OSTEOPOROSIS WITHOUT CURRENT PATHOLOGICAL FRACTURE: ICD-10-CM

## 2024-06-19 DIAGNOSIS — L85.3 DRY SKIN DERMATITIS: ICD-10-CM

## 2024-06-19 DIAGNOSIS — Z00.00 MEDICARE ANNUAL WELLNESS VISIT, SUBSEQUENT: Primary | ICD-10-CM

## 2024-06-19 DIAGNOSIS — K76.89 HEPATIC CYST: ICD-10-CM

## 2024-06-19 DIAGNOSIS — Z13.1 SCREENING FOR DIABETES MELLITUS: ICD-10-CM

## 2024-06-19 DIAGNOSIS — K74.3 PRIMARY BILIARY CHOLANGITIS (HCC): ICD-10-CM

## 2024-06-19 DIAGNOSIS — E78.2 MIXED HYPERLIPIDEMIA: ICD-10-CM

## 2024-06-19 DIAGNOSIS — E55.9 VITAMIN D DEFICIENCY: ICD-10-CM

## 2024-06-19 DIAGNOSIS — I10 PRIMARY HYPERTENSION: ICD-10-CM

## 2024-06-19 PROCEDURE — 99214 OFFICE O/P EST MOD 30 MIN: CPT | Performed by: FAMILY MEDICINE

## 2024-06-19 PROCEDURE — 90471 IMMUNIZATION ADMIN: CPT | Performed by: FAMILY MEDICINE

## 2024-06-19 PROCEDURE — 90677 PCV20 VACCINE IM: CPT | Performed by: FAMILY MEDICINE

## 2024-06-19 PROCEDURE — G0439 PPPS, SUBSEQ VISIT: HCPCS | Performed by: FAMILY MEDICINE

## 2024-06-19 RX ORDER — DIAPER,BRIEF,INFANT-TODD,DISP
EACH MISCELLANEOUS 2 TIMES DAILY
Qty: 56 G | Refills: 1 | Status: SHIPPED | OUTPATIENT
Start: 2024-06-19 | End: 2024-06-20

## 2024-06-19 NOTE — PROGRESS NOTES
Assessment and Plan:     Problem List Items Addressed This Visit     Primary biliary cholangitis (HCC)    Relevant Orders    Ambulatory Referral to Rheumatology    US abdomen complete    Sjogren's syndrome (HCC)    Relevant Orders    Ambulatory Referral to Rheumatology    Ambulatory Referral to Ophthalmology    Age-related osteoporosis without current pathological fracture    Hypertension    Relevant Orders    TSH, 3rd generation with Free T4 reflex    UA w Reflex to Microscopic w Reflex to Culture    Hyperlipidemia    Relevant Orders    US abdomen complete    Comprehensive metabolic panel    Lipid panel    Hepatic cyst    Relevant Orders    US abdomen complete    Comprehensive metabolic panel   Other Visit Diagnoses     Medicare annual wellness visit, subsequent    -  Primary    Relevant Orders    CBC and differential    Comprehensive metabolic panel    Dry skin dermatitis        Relevant Medications    hydrocortisone 0.5 % ointment    Other Relevant Orders    Ambulatory Referral to Rheumatology    Ambulatory Referral to Dermatology    Vitamin D deficiency        Relevant Orders    Vitamin D 25 hydroxy    Nodular thyroid disease        Relevant Orders    US thyroid    Screening for diabetes mellitus        Relevant Orders    Hemoglobin A1C    Encounter for immunization        Relevant Orders    Pneumococcal Conjugate Vaccine 20-valent (Pcv20) (Completed)          Regarding her Medicare annual well visit, patient is given age and diagnosis appropriate evaluation and care.  Patient is ordered the above blood work and urine.  As discussed with patient and daughter patient will also be screened for diabetes.  With her next blood work.  Patient is given the PCV 20 vaccine with us today.  Regarding her dry skin dermatitis, discussed with patient and daughter.  Patient was given hydrocortisone ointment to be used topically.  Patient education.  And patient referred to dermatology and rheumatology because of history of  Sjogren's.  Also wants to make sure that she does not have psoriasis in her ear canals because that is where she has a dry skin as per patient and thus where she uses the steroid ointment topical.  Regarding her Sjogren's syndrome, discussed with patient daughter.  Patient will see her ophthalmologist.  Patient was also referred to rheumatology.  Regarding her hypertension patient's blood pressure is okay.  Heart rate little high because she got a little nervous.  Denies any acute cardiovascular neurosymptoms from it.  Patient advised to have a low-salt diet.  Continue current therapy.  Will check her labs also.  Regarding her hyperlipidemia patient will get the blood work.  Patient advised to have less fats and starches and sweets.  Diet and exercise as tolerated.  Regarding her osteoporosis, discussed with patient and daughter.  Patient recommended to have the Prolia shot administered every 6 months.  My staff will check with her insurance and if is approved patient will come back here and get the Prolia shot if patient and daughter decides that that is the route they want to take.  Regarding her primary bili cholangitis and hepatic cyst history, patient does see GI and will follow-up.  Patient will get her blood work and urine as above as well as an abdominal sonogram.  Regarding her vitamin D deficiency patient will supplement.  Will check her labs also.  Regarding the nodular thyroid on exam patient will get a thyroid sonogram.  Will also get TFTs and her blood work.  RTO 6 weeks and do the blood work and urine before.                Depression Screening and Follow-up Plan: Patient was screened for depression during today's encounter. They screened negative with a PHQ-9 score of 0.      Preventive health issues were discussed with patient, and age appropriate screening tests were ordered as noted in patient's After Visit Summary.  Personalized health advice and appropriate referrals for health education or  preventive services given if needed, as noted in patient's After Visit Summary.     History of Present Illness:     Patient presents for a Medicare Wellness Visit    68-year-old female here for her annual Medicare well visit.  Patient is accompanied by her daughter.  Patient concerned about the dry skin inside her her ears that she needs a refill on her steroid ointment.  Patient is also worried about her dry eyes and dry mouth.  Patient has been diagnosed with Sjogren's disease and has seen an ophthalmologist was not sure if they have addressed this with her or not.  Patient has not seen a rheumatologist.  Patient sees GI for her gastroparesis and cholangitis.  Patient did receive the RSV vaccine in April of this year.  Patient is a PCV 20 vaccine.  Last Tdap was in 2015.  She is up-to-date with the rest.  Patient denies tobacco alcohol or drugs.  Patient has had a bone density which showed osteoporosis but patient has not been on any treatment for it as of yet.       Patient Care Team:  Norberto Musa MD as PCP - General (Family Medicine)  Juan Antonio Weiss MD as PCP - [facility]CP-[facility] ([facility])  Juan Antonio Weiss MD as PCP - PCP-[employer] ([facility])  Sommer Pena DO     Review of Systems:     Review of Systems   Constitutional:  Negative for activity change, appetite change, chills, fatigue, fever and unexpected weight change.   HENT:  Negative for congestion, hearing loss and sore throat.    Eyes:  Positive for visual disturbance.   Respiratory:  Negative for cough and shortness of breath.    Cardiovascular:  Negative for chest pain and palpitations.   Gastrointestinal:  Positive for abdominal pain. Negative for blood in stool, constipation, diarrhea, nausea and vomiting.   Genitourinary:  Negative for dysuria and hematuria.   Musculoskeletal:  Negative for arthralgias.   Skin:  Positive for rash.   Neurological:  Negative for dizziness and headaches.   Psychiatric/Behavioral:   Negative for dysphoric mood and sleep disturbance. The patient is not nervous/anxious.       Problem List:     Patient Active Problem List   Diagnosis   • Primary biliary cholangitis (HCC)   • Depression   • Low vitamin D level   • Diverticulosis   • Dysphagia   • Tricuspid regurgitation   • Sjogren's syndrome (HCC)   • Ovarian cyst   • Age-related osteoporosis without current pathological fracture   • Lumbar spondylosis   • Intestinal metaplasia of gastric mucosa   • Hypertension   • Hyperlipidemia   • Hepatic cyst   • Gastroparesis   • Esophageal reflux   • Memory loss   • Fatty liver disease, nonalcoholic   • Periodontitis   • Slow transit constipation   • Skin lesion   • Skin lesion of face      Past Medical and Surgical History:     Past Medical History:   Diagnosis Date   • Acid reflux disease    • Anemia    • Anxiety    • Cirrhosis (HCC)    • Depression    • Fibromyalgia    • Fibromyalgia    • Gastritis    • GERD (gastroesophageal reflux disease)    • High blood pressure    • Hyperlipidemia    • Hypertension    • Rheumatoid arthritis (HCC)    • Urinary tract infection      Past Surgical History:   Procedure Laterality Date   • BREAST BIOPSY Right     2011   • CHOLECYSTECTOMY     • COLONOSCOPY      Fiberoptic   • HYSTERECTOMY     • UPPER GASTROINTESTINAL ENDOSCOPY      therapeutic      Family History:     Family History   Problem Relation Age of Onset   • Heart attack Mother    • Heart disease Mother    • Hypertension Mother         High Blood Prssure   • Hyperlipidemia Mother         High Cholesterol   • Stroke Mother    • Breast cancer Sister    • Depression Sister    • Other Sister         Thyroid Cyst   • Diabetes Brother    • Ovarian cancer Sister    • Stroke Grandchild    • Cholelithiasis Family    • Depression Family    • Gallbladder disease Family       Social History:     Social History     Socioeconomic History   • Marital status: Single     Spouse name: None   • Number of children: None   • Years of  education: None   • Highest education level: None   Occupational History   • None   Tobacco Use   • Smoking status: Never   • Smokeless tobacco: Never   • Tobacco comments:     Denied Tobacco Use   Vaping Use   • Vaping status: Never Used   Substance and Sexual Activity   • Alcohol use: No   • Drug use: No   • Sexual activity: Not Currently   Other Topics Concern   • None   Social History Narrative    Non smoker     No known smoke exposure '    No caffeine     No Alcohol use     No illicit drug use     Has 3 children     Single     Lives with daughter      Social Determinants of Health     Financial Resource Strain: Low Risk  (10/14/2019)    Overall Financial Resource Strain (CARDIA)    • Difficulty of Paying Living Expenses: Not hard at all   Food Insecurity: No Food Insecurity (6/19/2024)    Hunger Vital Sign    • Worried About Running Out of Food in the Last Year: Never true    • Ran Out of Food in the Last Year: Never true   Transportation Needs: No Transportation Needs (6/19/2024)    PRAPARE - Transportation    • Lack of Transportation (Medical): No    • Lack of Transportation (Non-Medical): No   Physical Activity: Inactive (10/14/2019)    Exercise Vital Sign    • Days of Exercise per Week: 0 days    • Minutes of Exercise per Session: 0 min   Stress: Stress Concern Present (10/14/2019)    [employer] [place] [employer] - Occupational Stress Questionnaire    • Feeling of Stress : Very much   Social Connections: Unknown (10/14/2019)    Social Connection and Isolation Panel [NHANES]    • Frequency of Communication with Friends and Family: Patient declined    • Frequency of Social Gatherings with Friends and Family: Patient declined    • Attends [employer] Services: Patient declined    • Active Member of Clubs or Organizations: Patient declined    • Attends Club or Organization Meetings: Patient declined    • Marital Status: Patient declined   Intimate Partner Violence: Not At Risk (10/14/2019)     Humiliation, Afraid, Rape, and Kick questionnaire    • Fear of Current or Ex-Partner: No    • Emotionally Abused: No    • Physically Abused: No    • Sexually Abused: No   Housing Stability: Unknown (6/19/2024)    Housing Stability Vital Sign    • Unable to Pay for Housing in the Last Year: No    • Number of Times Moved in the Last Year: Not on file    • Homeless in the Last Year: No      Medications and Allergies:     Current Outpatient Medications   Medication Sig Dispense Refill   • Cholecalciferol (RA Vitamin D-3) 25 MCG (1000 UT) tablet Take 1 tablet (1,000 Units total) by mouth daily 90 tablet 1   • Collagen Hydrolysate, Bovine, POWD Use     • dicyclomine (BENTYL) 10 mg capsule take 1 capsule by mouth four times a day before meals and at bedtime 120 capsule 5   • esomeprazole (NexIUM) 40 MG capsule take 1 capsule by mouth once daily 30 capsule 5   • famotidine (PEPCID) 20 mg tablet Take 1 tablet (20 mg total) by mouth daily at bedtime 90 tablet 3   • hydrocortisone (ANUSOL-HC) 2.5 % rectal cream Apply topically 2 (two) times a day 28 g 0   • hydrocortisone 0.5 % ointment Apply topically 2 (two) times a day 56 g 1   • rosuvastatin (CRESTOR) 10 MG tablet take 1 tablet by mouth once daily 100 tablet 1   • ursodiol (ACTIGALL) 300 mg capsule take 1 capsule by mouth twice a day 60 capsule 5   • calcium carbonate (OS-MOLLY) 600 MG tablet Take 1 tablet (600 mg total) by mouth daily (Patient not taking: Reported on 5/6/2024) 90 tablet 1   • lidocaine (XYLOCAINE) 5 % ointment Apply topically as needed for mild pain 35.44 g 0     No current facility-administered medications for this visit.     No Known Allergies   Immunizations:     Immunization History   Administered Date(s) Administered   • COVID-19 PFIZER VACCINE 0.3 ML IM 03/10/2021, 04/01/2021, 11/17/2021   • Hep A, adult 08/19/2020, 05/19/2021   • Hep B, adult 12/11/2023, 03/11/2024   • INFLUENZA 12/14/2006, 02/20/2013, 12/04/2013, 07/30/2014, 08/08/2015, 08/16/2016,  08/22/2017, 09/14/2018, 09/09/2020, 09/24/2021   • Influenza, injectable, quadrivalent, preservative free 0.5 mL 09/14/2018, 10/03/2019   • Influenza, seasonal, injectable, preservative free 09/14/2018   • Pneumococcal Conjugate Vaccine 20-valent (Pcv20), Polysace 06/19/2024   • Pneumococcal Polysaccharide PPV23 10/14/2019   • Tdap 03/23/2015   • Zoster Vaccine Recombinant 09/15/2020, 12/28/2020      Health Maintenance:         Topic Date Due   • Breast Cancer Screening: Mammogram  11/22/2024   • Colorectal Cancer Screening  11/09/2025   • Hepatitis C Screening  Completed         Topic Date Due   • COVID-19 Vaccine (4 - 2023-24 season) 09/01/2023   • Hepatitis B Vaccine (3 of 3 - Risk 3-dose series) 06/11/2024      Medicare Screening Tests and Risk Assessments:     Last Medicare Wellness visit information reviewed, patient interviewed and updates made to the record today.      Health Risk Assessment:   Patient rates overall health as fair. Patient feels that their physical health rating is same. Patient is satisfied with their life. Eyesight was rated as same. Hearing was rated as same. Patient feels that their emotional and mental health rating is same. Patients states they are never, rarely angry. Patient states they are always unusually tired/fatigued. Pain experienced in the last 7 days has been some. Patient's pain rating has been 7/10. Patient states that she has experienced no weight loss or gain in last 6 months. Patient reports stomach pain.  Discussed with patient.  Patient sees GI also.    Depression Screening:   PHQ-9 Score: 0      Fall Risk Screening:   In the past year, patient has experienced: no history of falling in past year      Urinary Incontinence Screening:   Patient has not leaked urine accidently in the last six months.     Home Safety:  Patient does not have trouble with stairs inside or outside of their home. Patient has working smoke alarms and has working carbon monoxide detector. Home  safety hazards include: none.     Nutrition:   Current diet is Regular.     Medications:   Patient is currently taking over-the-counter supplements. OTC medications include: see medication list. Patient is able to manage medications.     Activities of Daily Living (ADLs)/Instrumental Activities of Daily Living (IADLs):   Walk and transfer into and out of bed and chair?: Yes  Dress and groom yourself?: Yes    Bathe or shower yourself?: Yes    Feed yourself? Yes  Do your laundry/housekeeping?: Yes  Manage your money, pay your bills and track your expenses?: Yes  Make your own meals?: Yes    Do your own shopping?: Yes    Previous Hospitalizations:   Any hospitalizations or ED visits within the last 12 months?: No      Advance Care Planning:   Living will: No    Advanced directive: No    ACP document given: Yes      Comments: Discussed with patient and with her daughter.    Cognitive Screening:   Provider or family/friend/caregiver concerned regarding cognition?: No    PREVENTIVE SCREENINGS      Cardiovascular Screening:    General: History Lipid Disorder    Due for: Lipid Panel      Diabetes Screening:     General: Screening Current and Risks and Benefits Discussed    Due for: Blood Glucose      Colorectal Cancer Screening:     General: Screening Current      Breast Cancer Screening:     General: Screening Current      Cervical Cancer Screening:    General: Screening Not Indicated      Osteoporosis Screening:    General: History Osteoporosis      Lung Cancer Screening:     General: Screening Not Indicated      Hepatitis C Screening:    General: Screening Current    Screening, Brief Intervention, and Referral to Treatment (SBIRT)    Screening  Typical number of drinks in a day: 0  Typical number of drinks in a week: 0  Interpretation: Low risk drinking behavior.    AUDIT-C Screenin) How often did you have a drink containing alcohol in the past year? never  2) How many drinks did you have on a typical day when you  "were drinking in the past year? 0  3) How often did you have 6 or more drinks on one occasion in the past year? never    AUDIT-C Score: 0  Interpretation: Score 0-2 (female): Negative screen for alcohol misuse    Single Item Drug Screening:  How often have you used an illegal drug (including marijuana) or a prescription medication for non-medical reasons in the past year? never    Single Item Drug Screen Score: 0  Interpretation: Negative screen for possible drug use disorder    Other Counseling Topics:   Car/seat belt/driving safety, skin self-exam, sunscreen and calcium and vitamin D intake and regular weightbearing exercise.     No results found.     Physical Exam:     /75 (BP Location: Left arm, Patient Position: Sitting, Cuff Size: Adult)   Pulse 90   Temp 97.7 °F (36.5 °C) (Temporal)   Resp 16   Ht 5' 5\" (1.651 m)   Wt 67.9 kg (149 lb 9.6 oz)   LMP  (LMP Unknown)   SpO2 98%   BMI 24.89 kg/m²     Physical Exam  Vitals reviewed.   Constitutional:       General: She is not in acute distress.     Appearance: Normal appearance. She is normal weight. She is not ill-appearing.   HENT:      Head: Normocephalic and atraumatic.      Right Ear: Tympanic membrane, ear canal and external ear normal.      Left Ear: Tympanic membrane, ear canal and external ear normal.      Mouth/Throat:      Mouth: Mucous membranes are moist.      Pharynx: Oropharynx is clear.   Eyes:      Extraocular Movements: Extraocular movements intact.      Conjunctiva/sclera: Conjunctivae normal.   Neck:      Vascular: No carotid bruit.      Comments: Thyroid slightly nodular on exam.  Cardiovascular:      Rate and Rhythm: Normal rate and regular rhythm.   Pulmonary:      Effort: Pulmonary effort is normal.      Breath sounds: Normal breath sounds.   Abdominal:      General: Bowel sounds are normal.      Palpations: Abdomen is soft.      Tenderness: There is abdominal tenderness. There is no right CVA tenderness, left CVA tenderness, " guarding or rebound.      Comments: Positive tenderness not new.  Patient and daughter asserts she has gastroparesis.  No acute distress   Musculoskeletal:         General: No tenderness.      Right lower leg: No edema.      Left lower leg: No edema.      Comments: No calf tenderness bilateral.   Lymphadenopathy:      Cervical: No cervical adenopathy.   Skin:     General: Skin is warm.   Neurological:      General: No focal deficit present.      Mental Status: She is alert and oriented to person, place, and time.   Psychiatric:         Mood and Affect: Mood normal.         Behavior: Behavior normal.         Thought Content: Thought content normal.          Norberto Musa MD

## 2024-06-20 ENCOUNTER — TELEPHONE (OUTPATIENT)
Dept: FAMILY MEDICINE CLINIC | Facility: CLINIC | Age: 69
End: 2024-06-20

## 2024-06-20 DIAGNOSIS — L85.3 DRY SKIN DERMATITIS: Primary | ICD-10-CM

## 2024-06-20 RX ORDER — BENZOCAINE/MENTHOL 6 MG-10 MG
LOZENGE MUCOUS MEMBRANE 3 TIMES DAILY
Qty: 56 G | Refills: 1 | Status: SHIPPED | OUTPATIENT
Start: 2024-06-20

## 2024-06-20 NOTE — TELEPHONE ENCOUNTER
Pharmacy called the RX Refill Line. Message is being forwarded to the office.     Pharmacy is requesting the hydocortisone ointment be changed to the cream as the ointment is not available.

## 2024-07-08 ENCOUNTER — APPOINTMENT (OUTPATIENT)
Dept: LAB | Facility: CLINIC | Age: 69
End: 2024-07-08
Payer: COMMERCIAL

## 2024-07-08 ENCOUNTER — HOSPITAL ENCOUNTER (OUTPATIENT)
Dept: ULTRASOUND IMAGING | Facility: HOSPITAL | Age: 69
Discharge: HOME | End: 2024-07-08
Attending: FAMILY MEDICINE
Payer: COMMERCIAL

## 2024-07-08 DIAGNOSIS — K76.89 HEPATIC CYST: ICD-10-CM

## 2024-07-08 DIAGNOSIS — E04.1 NODULAR THYROID DISEASE: ICD-10-CM

## 2024-07-08 DIAGNOSIS — E55.9 VITAMIN D DEFICIENCY: ICD-10-CM

## 2024-07-08 DIAGNOSIS — E78.2 MIXED HYPERLIPIDEMIA: ICD-10-CM

## 2024-07-08 DIAGNOSIS — Z00.00 MEDICARE ANNUAL WELLNESS VISIT, SUBSEQUENT: ICD-10-CM

## 2024-07-08 DIAGNOSIS — Z13.1 SCREENING FOR DIABETES MELLITUS: ICD-10-CM

## 2024-07-08 DIAGNOSIS — K74.3 PRIMARY BILIARY CHOLANGITIS (HCC): ICD-10-CM

## 2024-07-08 DIAGNOSIS — I10 PRIMARY HYPERTENSION: ICD-10-CM

## 2024-07-08 LAB
25(OH)D3 SERPL-MCNC: 43.7 NG/ML (ref 30–100)
ALBUMIN SERPL BCG-MCNC: 4.1 G/DL (ref 3.5–5)
ALP SERPL-CCNC: 91 U/L (ref 34–104)
ALT SERPL W P-5'-P-CCNC: 12 U/L (ref 7–52)
ANION GAP SERPL CALCULATED.3IONS-SCNC: 6 MMOL/L (ref 4–13)
AST SERPL W P-5'-P-CCNC: 14 U/L (ref 13–39)
BACTERIA UR QL AUTO: ABNORMAL /HPF
BASOPHILS # BLD AUTO: 0.05 THOUSANDS/ÂΜL (ref 0–0.1)
BASOPHILS NFR BLD AUTO: 1 % (ref 0–1)
BILIRUB SERPL-MCNC: 0.54 MG/DL (ref 0.2–1)
BILIRUB UR QL STRIP: NEGATIVE
BUN SERPL-MCNC: 15 MG/DL (ref 5–25)
CALCIUM SERPL-MCNC: 9.4 MG/DL (ref 8.4–10.2)
CHLORIDE SERPL-SCNC: 105 MMOL/L (ref 96–108)
CHOLEST SERPL-MCNC: 237 MG/DL
CLARITY UR: CLEAR
CO2 SERPL-SCNC: 28 MMOL/L (ref 21–32)
COLOR UR: ABNORMAL
CREAT SERPL-MCNC: 0.79 MG/DL (ref 0.6–1.3)
EOSINOPHIL # BLD AUTO: 0.1 THOUSAND/ÂΜL (ref 0–0.61)
EOSINOPHIL NFR BLD AUTO: 1 % (ref 0–6)
ERYTHROCYTE [DISTWIDTH] IN BLOOD BY AUTOMATED COUNT: 13.2 % (ref 11.6–15.1)
EST. AVERAGE GLUCOSE BLD GHB EST-MCNC: 120 MG/DL
GFR SERPL CREATININE-BSD FRML MDRD: 77 ML/MIN/1.73SQ M
GLUCOSE P FAST SERPL-MCNC: 89 MG/DL (ref 65–99)
GLUCOSE UR STRIP-MCNC: NEGATIVE MG/DL
HBA1C MFR BLD: 5.8 %
HCT VFR BLD AUTO: 39.4 % (ref 34.8–46.1)
HDLC SERPL-MCNC: 39 MG/DL
HGB BLD-MCNC: 12.6 G/DL (ref 11.5–15.4)
HGB UR QL STRIP.AUTO: NEGATIVE
IMM GRANULOCYTES # BLD AUTO: 0.02 THOUSAND/UL (ref 0–0.2)
IMM GRANULOCYTES NFR BLD AUTO: 0 % (ref 0–2)
KETONES UR STRIP-MCNC: NEGATIVE MG/DL
LDLC SERPL CALC-MCNC: 171 MG/DL (ref 0–100)
LEUKOCYTE ESTERASE UR QL STRIP: NEGATIVE
LYMPHOCYTES # BLD AUTO: 2.83 THOUSANDS/ÂΜL (ref 0.6–4.47)
LYMPHOCYTES NFR BLD AUTO: 35 % (ref 14–44)
MCH RBC QN AUTO: 29.9 PG (ref 26.8–34.3)
MCHC RBC AUTO-ENTMCNC: 32 G/DL (ref 31.4–37.4)
MCV RBC AUTO: 93 FL (ref 82–98)
MONOCYTES # BLD AUTO: 0.96 THOUSAND/ÂΜL (ref 0.17–1.22)
MONOCYTES NFR BLD AUTO: 12 % (ref 4–12)
NEUTROPHILS # BLD AUTO: 4.23 THOUSANDS/ÂΜL (ref 1.85–7.62)
NEUTS SEG NFR BLD AUTO: 51 % (ref 43–75)
NITRITE UR QL STRIP: POSITIVE
NON-SQ EPI CELLS URNS QL MICRO: ABNORMAL /HPF
NONHDLC SERPL-MCNC: 198 MG/DL
NRBC BLD AUTO-RTO: 0 /100 WBCS
PH UR STRIP.AUTO: 7.5 [PH]
PLATELET # BLD AUTO: 346 THOUSANDS/UL (ref 149–390)
PMV BLD AUTO: 10.3 FL (ref 8.9–12.7)
POTASSIUM SERPL-SCNC: 4.4 MMOL/L (ref 3.5–5.3)
PROT SERPL-MCNC: 8 G/DL (ref 6.4–8.4)
PROT UR STRIP-MCNC: NEGATIVE MG/DL
RBC # BLD AUTO: 4.22 MILLION/UL (ref 3.81–5.12)
RBC #/AREA URNS AUTO: ABNORMAL /HPF
SODIUM SERPL-SCNC: 139 MMOL/L (ref 135–147)
SP GR UR STRIP.AUTO: 1.02 (ref 1–1.03)
TRIGL SERPL-MCNC: 133 MG/DL
TSH SERPL DL<=0.05 MIU/L-ACNC: 1.94 UIU/ML (ref 0.45–4.5)
UROBILINOGEN UR STRIP-ACNC: <2 MG/DL
WBC # BLD AUTO: 8.19 THOUSAND/UL (ref 4.31–10.16)
WBC #/AREA URNS AUTO: ABNORMAL /HPF

## 2024-07-08 PROCEDURE — 84443 ASSAY THYROID STIM HORMONE: CPT

## 2024-07-08 PROCEDURE — 82306 VITAMIN D 25 HYDROXY: CPT

## 2024-07-08 PROCEDURE — 83036 HEMOGLOBIN GLYCOSYLATED A1C: CPT

## 2024-07-08 PROCEDURE — 76536 US EXAM OF HEAD AND NECK: CPT

## 2024-07-08 PROCEDURE — 85025 COMPLETE CBC W/AUTO DIFF WBC: CPT

## 2024-07-08 PROCEDURE — 81001 URINALYSIS AUTO W/SCOPE: CPT

## 2024-07-08 PROCEDURE — 76700 US EXAM ABDOM COMPLETE: CPT

## 2024-07-08 PROCEDURE — 80053 COMPREHEN METABOLIC PANEL: CPT

## 2024-07-08 PROCEDURE — 36415 COLL VENOUS BLD VENIPUNCTURE: CPT

## 2024-07-08 PROCEDURE — 80061 LIPID PANEL: CPT

## 2024-07-15 DIAGNOSIS — M35.00 SJOGREN'S SYNDROME, WITH UNSPECIFIED ORGAN INVOLVEMENT (HCC): ICD-10-CM

## 2024-07-15 DIAGNOSIS — E78.5 HYPERLIPIDEMIA, UNSPECIFIED HYPERLIPIDEMIA TYPE: ICD-10-CM

## 2024-07-15 RX ORDER — ROSUVASTATIN CALCIUM 10 MG/1
10 TABLET, COATED ORAL DAILY
Qty: 100 TABLET | Refills: 3 | Status: SHIPPED | OUTPATIENT
Start: 2024-07-15

## 2024-07-16 ENCOUNTER — TELEPHONE (OUTPATIENT)
Dept: FAMILY MEDICINE CLINIC | Facility: CLINIC | Age: 69
End: 2024-07-16

## 2024-07-16 NOTE — TELEPHONE ENCOUNTER
----- Message from Norberto Musa MD sent at 7/15/2024  5:50 PM EDT -----  Please call the patient regarding herl result.  Us sono w stable liver cysts.  Thyroid sono negative. ty

## 2024-07-29 ENCOUNTER — OFFICE VISIT (OUTPATIENT)
Dept: FAMILY MEDICINE CLINIC | Facility: CLINIC | Age: 69
End: 2024-07-29
Payer: COMMERCIAL

## 2024-07-29 VITALS
HEART RATE: 79 BPM | TEMPERATURE: 98.1 F | RESPIRATION RATE: 16 BRPM | DIASTOLIC BLOOD PRESSURE: 79 MMHG | BODY MASS INDEX: 24.43 KG/M2 | OXYGEN SATURATION: 95 % | HEIGHT: 65 IN | WEIGHT: 146.6 LBS | SYSTOLIC BLOOD PRESSURE: 143 MMHG

## 2024-07-29 DIAGNOSIS — E78.2 MIXED HYPERLIPIDEMIA: Primary | ICD-10-CM

## 2024-07-29 DIAGNOSIS — R73.03 PREDIABETES: ICD-10-CM

## 2024-07-29 DIAGNOSIS — K76.89 HEPATIC CYST: ICD-10-CM

## 2024-07-29 DIAGNOSIS — I10 PRIMARY HYPERTENSION: ICD-10-CM

## 2024-07-29 PROCEDURE — 3077F SYST BP >= 140 MM HG: CPT | Performed by: FAMILY MEDICINE

## 2024-07-29 PROCEDURE — 1159F MED LIST DOCD IN RCRD: CPT | Performed by: FAMILY MEDICINE

## 2024-07-29 PROCEDURE — 99214 OFFICE O/P EST MOD 30 MIN: CPT | Performed by: FAMILY MEDICINE

## 2024-07-29 PROCEDURE — G2211 COMPLEX E/M VISIT ADD ON: HCPCS | Performed by: FAMILY MEDICINE

## 2024-07-29 PROCEDURE — 3078F DIAST BP <80 MM HG: CPT | Performed by: FAMILY MEDICINE

## 2024-07-29 PROCEDURE — 1160F RVW MEDS BY RX/DR IN RCRD: CPT | Performed by: FAMILY MEDICINE

## 2024-07-29 NOTE — PROGRESS NOTES
Ambulatory Visit  Name: Felicitas Stevens      : 1955      MRN: [id number]  Encounter Provider: Norberto Musa MD  Encounter Date: 2024   Encounter department: [facility]    Assessment & Plan   1. Mixed hyperlipidemia  -     Comprehensive metabolic panel; Future  -     Lipid panel; Future  2. Primary hypertension  -     Comprehensive metabolic panel; Future  -     UA w Reflex to Microscopic w Reflex to Culture; Future; Expected date: 2024  3. Hepatic cyst  -     Comprehensive metabolic panel; Future  4. Prediabetes  -     Comprehensive metabolic panel; Future  -     Hemoglobin A1C; Future  -     Albumin / creatinine urine ratio; Future      Regarding her hyperlipidemia patient's LFTs are normal.  Patient's total cholesterol went up to 237 from 130 from November of last year, triglycerides went up to 133 from one of them from 33 and her LDL went up to 176.  Her daughter and patient were made aware of these numbers going up and strongly advised to monitor the diet because she says she takes her medication religiously every night.  Patient advised to cut down on the fats starches and sweets.  Exercise as tolerated.  Continue her statin.  I will recheck labs again in 3 months.  Regarding hypertension her blood pressure is slightly elevated secondary to her being anxious.  Patient denies any acute cardiovascular neurosymptoms from it.  Patient advised to hydrate well.  To have a low sodium condiment diet.  GFR this time was discussed with patient and her daughter.  Will encourage and is encouraged to drink more water and I will recheck that again with her next labs.  Her GFR was 77.  Abdominal sonogram from this month showed her kidneys to be within normal limit.  Regarding her liver cysts history, patient's last sonogram from this month with discussed with patient.  Largest cyst is 5.1 cm.  Patient does see hepatologist and follow-up with them as well.  Her LFTs are  normal.  Regarding her prediabetes, new diagnosis, her glucose was 89 and her A1c went up to 5.8 from 5.6 when it was last checked in September 2022.  She was made aware that she needs to cut down on her starches and sweets and fats.  Exercise as tolerated.  I will check this value also with her blood work in 3 months.  RTO 3 months and do the blood work and urine before.           History of Present Illness     68-year-old female here accompanied by her daughter to be evaluated for her lipids and sugar hypertension.  Patient did blood work and urine.  Patient also did a thyroid sonogram as well as an abdominal sonogram.  The thyroid sonogram was.  The abdominal sonogram showed a liver cyst largest was 5.1 cm.  Patient does see hematologist for her cholangitis history.  Patient did receive 2 hepatitis B vaccines and was told she is not due for the third 1 yet.  No adverse reaction to the vaccine.  Patient is up-to-date with the rest of her vaccines.  Patient saw her ophthalmologist gave her artificial tears.  Patient does have a history of Sjogren's disease also.  And per her daughter patient is scheduled to see the rheumatologist in October.  Patient denies any UTI symptoms at the time of her blood work/urine as well as now.        Review of Systems   Constitutional:  Negative for chills, fatigue, fever and unexpected weight change.   HENT:  Negative for congestion and sore throat.    Eyes:  Negative for visual disturbance.   Respiratory:  Negative for cough and shortness of breath.    Cardiovascular:  Negative for chest pain and palpitations.   Gastrointestinal:  Negative for abdominal pain, rectal pain and vomiting.   Genitourinary:  Negative for dysuria and hematuria.   Neurological:  Negative for dizziness and headaches.   Psychiatric/Behavioral:  Negative for dysphoric mood. The patient is not nervous/anxious.        Objective     /79 (BP Location: Left arm, Patient Position: Sitting, Cuff Size: Adult)   " Pulse 79   Temp 98.1 °F (36.7 °C) (Tympanic)   Resp 16   Ht 5' 5\" (1.651 m)   Wt 66.5 kg (146 lb 9.6 oz)   LMP  (LMP Unknown)   SpO2 95%   BMI 24.40 kg/m²     Physical Exam  Vitals reviewed.   Constitutional:       General: She is not in acute distress.     Appearance: Normal appearance. She is normal weight. She is not ill-appearing.   HENT:      Head: Normocephalic and atraumatic.   Neck:      Vascular: No carotid bruit.   Cardiovascular:      Rate and Rhythm: Normal rate and regular rhythm.   Pulmonary:      Effort: Pulmonary effort is normal.      Breath sounds: Normal breath sounds.   Musculoskeletal:      Right lower leg: No edema.      Left lower leg: No edema.      Comments: No calf tenderness bilateral.   Lymphadenopathy:      Cervical: No cervical adenopathy.   Skin:     General: Skin is warm.   Neurological:      General: No focal deficit present.      Mental Status: She is alert and oriented to person, place, and time.   Psychiatric:         Mood and Affect: Mood normal.         Behavior: Behavior normal.         Thought Content: Thought content normal.       Administrative Statements           "

## 2024-08-15 ENCOUNTER — OFFICE VISIT (OUTPATIENT)
Dept: FAMILY MEDICINE CLINIC | Facility: CLINIC | Age: 69
End: 2024-08-15
Payer: COMMERCIAL

## 2024-08-15 VITALS
TEMPERATURE: 98 F | OXYGEN SATURATION: 99 % | HEART RATE: 83 BPM | BODY MASS INDEX: 24.13 KG/M2 | SYSTOLIC BLOOD PRESSURE: 136 MMHG | DIASTOLIC BLOOD PRESSURE: 96 MMHG | WEIGHT: 145 LBS

## 2024-08-15 DIAGNOSIS — R30.0 DYSURIA: ICD-10-CM

## 2024-08-15 DIAGNOSIS — N30.00 ACUTE CYSTITIS WITHOUT HEMATURIA: Primary | ICD-10-CM

## 2024-08-15 LAB
SL AMB  POCT GLUCOSE, UA: NORMAL
SL AMB LEUKOCYTE ESTERASE,UA: NORMAL
SL AMB POCT BILIRUBIN,UA: NORMAL
SL AMB POCT BLOOD,UA: NORMAL
SL AMB POCT CLARITY,UA: CLEAR
SL AMB POCT COLOR,UA: YELLOW
SL AMB POCT KETONES,UA: NORMAL
SL AMB POCT NITRITE,UA: NORMAL
SL AMB POCT PH,UA: 6
SL AMB POCT SPECIFIC GRAVITY,UA: 1.02
SL AMB POCT URINE PROTEIN: NORMAL
SL AMB POCT UROBILINOGEN: 0.2

## 2024-08-15 PROCEDURE — 87077 CULTURE AEROBIC IDENTIFY: CPT

## 2024-08-15 PROCEDURE — 87186 SC STD MICRODIL/AGAR DIL: CPT

## 2024-08-15 PROCEDURE — 81003 URINALYSIS AUTO W/O SCOPE: CPT

## 2024-08-15 PROCEDURE — 87086 URINE CULTURE/COLONY COUNT: CPT

## 2024-08-15 PROCEDURE — 99213 OFFICE O/P EST LOW 20 MIN: CPT

## 2024-08-15 PROCEDURE — G2211 COMPLEX E/M VISIT ADD ON: HCPCS

## 2024-08-15 RX ORDER — NITROFURANTOIN 25; 75 MG/1; MG/1
100 CAPSULE ORAL 2 TIMES DAILY
Qty: 10 CAPSULE | Refills: 0 | Status: SHIPPED | OUTPATIENT
Start: 2024-08-15 | End: 2024-08-20

## 2024-08-15 NOTE — PROGRESS NOTES
Ambulatory Visit  Name: Felicitas Stevens      : 1955      MRN: [id number]  Encounter Provider: CONNIE Parson  Encounter Date: 8/15/2024   Encounter department: [facility]    Assessment & Plan   1. Acute cystitis without hematuria  Assessment & Plan:  Dysuria, frequency and flank pain x 4 days. Pt denies N/V or fever, urine dip positive for leukocytes. Empirically start nitrofurantoin 100 mg bid x 5 days, encourage fluids, can take tylenol/ibuprofen prn for pain. Send urine for culture and educate must seek immediate care for worsening/persistent sxs.  Orders:  -     nitrofurantoin (MACROBID) 100 mg capsule; Take 1 capsule (100 mg total) by mouth 2 (two) times a day for 5 days  -     Urine culture; Future  2. Dysuria  -     POCT urine dip auto non-scope       History of Present Illness     Pt presents with dysuria, frequency, L flank pain x 4 days, denies N/V or fever.         Review of Systems   Constitutional:  Negative for chills and fever.   Eyes:  Negative for discharge.   Respiratory:  Negative for shortness of breath.    Cardiovascular:  Negative for chest pain.   Gastrointestinal:  Positive for abdominal pain. Negative for nausea and vomiting.   Genitourinary:  Positive for difficulty urinating, dysuria, flank pain, frequency and urgency. Negative for decreased urine volume, dyspareunia, enuresis, genital sores, hematuria, menstrual problem, pelvic pain, vaginal bleeding and vaginal discharge.   Musculoskeletal:  Positive for back pain.   Skin:  Negative for color change.   Allergic/Immunologic: Negative for environmental allergies.   Psychiatric/Behavioral:  Negative for confusion.      Current Outpatient Medications on File Prior to Visit   Medication Sig Dispense Refill    Cholecalciferol (RA Vitamin D-3) 25 MCG (1000 UT) tablet Take 1 tablet (1,000 Units total) by mouth daily 90 tablet 1    Collagen Hydrolysate, Bovine, POWD Use      dicyclomine  (BENTYL) 10 mg capsule take 1 capsule by mouth four times a day before meals and at bedtime 120 capsule 5    esomeprazole (NexIUM) 40 MG capsule take 1 capsule by mouth once daily 30 capsule 5    famotidine (PEPCID) 20 mg tablet Take 1 tablet (20 mg total) by mouth daily at bedtime 90 tablet 3    hydrocortisone (ANUSOL-HC) 2.5 % rectal cream Apply topically 2 (two) times a day 28 g 0    hydrocortisone 1 % cream Apply topically 3 (three) times a day 56 g 1    lidocaine (XYLOCAINE) 5 % ointment Apply topically as needed for mild pain 35.44 g 0    rosuvastatin (CRESTOR) 10 MG tablet Take 1 tablet (10 mg total) by mouth daily 100 tablet 3    ursodiol (ACTIGALL) 300 mg capsule take 1 capsule by mouth twice a day 60 capsule 5    calcium carbonate (OS-MOLLY) 600 MG tablet Take 1 tablet (600 mg total) by mouth daily (Patient not taking: Reported on 5/6/2024) 90 tablet 1     No current facility-administered medications on file prior to visit.      Social History     Tobacco Use    Smoking status: Never    Smokeless tobacco: Never    Tobacco comments:     Denied Tobacco Use   Vaping Use    Vaping status: Never Used   Substance and Sexual Activity    Alcohol use: No    Drug use: No    Sexual activity: Not Currently     Objective     /96 (BP Location: Left arm, Patient Position: Standing, Cuff Size: Standard)   Pulse 83   Temp 98 °F (36.7 °C)   Wt 65.8 kg (145 lb)   LMP  (LMP Unknown)   SpO2 99%   BMI 24.13 kg/m²     Physical Exam  Vitals and nursing note reviewed.   Constitutional:       General: She is not in acute distress.     Appearance: Normal appearance. She is not ill-appearing, toxic-appearing or diaphoretic.   HENT:      Head: Normocephalic and atraumatic.      Mouth/Throat:      Mouth: Mucous membranes are moist.      Pharynx: Oropharynx is clear.   Eyes:      Conjunctiva/sclera: Conjunctivae normal.      Pupils: Pupils are equal, round, and reactive to light.   Cardiovascular:      Rate and Rhythm: Normal  rate and regular rhythm.      Pulses: Normal pulses.      Heart sounds: Normal heart sounds.   Pulmonary:      Effort: Pulmonary effort is normal. No respiratory distress.      Breath sounds: Normal breath sounds. No wheezing.   Chest:      Chest wall: No tenderness.   Abdominal:      General: Bowel sounds are normal. There is no distension.      Palpations: Abdomen is soft. There is no shifting dullness, fluid wave, hepatomegaly, splenomegaly, mass or pulsatile mass.      Tenderness: There is generalized abdominal tenderness. There is no right CVA tenderness, left CVA tenderness, guarding or rebound.      Hernia: No hernia is present.   Musculoskeletal:         General: Tenderness (L lumbar tenderness) present.      Cervical back: Normal range of motion and neck supple.   Skin:     General: Skin is warm.      Coloration: Skin is not jaundiced.   Neurological:      Mental Status: She is alert and oriented to person, place, and time. Mental status is at baseline.   Psychiatric:         Mood and Affect: Mood normal.         Behavior: Behavior normal.         Thought Content: Thought content normal.         Judgment: Judgment normal.       Administrative Statements

## 2024-08-15 NOTE — ASSESSMENT & PLAN NOTE
Dysuria, frequency and flank pain x 4 days. Pt denies N/V or fever, urine dip positive for leukocytes. Empirically start nitrofurantoin 100 mg bid x 5 days, encourage fluids, can take tylenol/ibuprofen prn for pain. Send urine for culture and educate must seek immediate care for worsening/persistent sxs.

## 2024-08-17 LAB — BACTERIA UR CULT: ABNORMAL

## 2024-09-12 DIAGNOSIS — K74.3 PRIMARY BILIARY CHOLANGITIS (HCC): ICD-10-CM

## 2024-09-12 RX ORDER — URSODIOL 300 MG/1
300 CAPSULE ORAL 2 TIMES DAILY
Qty: 180 CAPSULE | Refills: 1 | Status: SHIPPED | OUTPATIENT
Start: 2024-09-12

## 2024-09-14 PROBLEM — N30.00 ACUTE CYSTITIS WITHOUT HEMATURIA: Status: RESOLVED | Noted: 2024-08-15 | Resolved: 2024-09-14

## 2024-09-16 ENCOUNTER — CLINICAL SUPPORT (OUTPATIENT)
Dept: FAMILY MEDICINE CLINIC | Facility: CLINIC | Age: 69
End: 2024-09-16
Payer: COMMERCIAL

## 2024-09-16 ENCOUNTER — OFFICE VISIT (OUTPATIENT)
Dept: FAMILY MEDICINE CLINIC | Facility: CLINIC | Age: 69
End: 2024-09-16
Payer: COMMERCIAL

## 2024-09-16 VITALS
TEMPERATURE: 97.4 F | OXYGEN SATURATION: 98 % | RESPIRATION RATE: 16 BRPM | DIASTOLIC BLOOD PRESSURE: 88 MMHG | SYSTOLIC BLOOD PRESSURE: 174 MMHG | BODY MASS INDEX: 23.86 KG/M2 | WEIGHT: 143.2 LBS | HEIGHT: 65 IN | HEART RATE: 73 BPM

## 2024-09-16 DIAGNOSIS — N39.0 RECURRENT UTI: Primary | ICD-10-CM

## 2024-09-16 DIAGNOSIS — L98.9 SKIN LESION OF RIGHT ARM: ICD-10-CM

## 2024-09-16 DIAGNOSIS — R35.0 FREQUENT URINATION: ICD-10-CM

## 2024-09-16 DIAGNOSIS — I10 PRIMARY HYPERTENSION: ICD-10-CM

## 2024-09-16 DIAGNOSIS — N30.00 ACUTE CYSTITIS WITHOUT HEMATURIA: ICD-10-CM

## 2024-09-16 DIAGNOSIS — K76.0 FATTY LIVER DISEASE, NONALCOHOLIC: ICD-10-CM

## 2024-09-16 DIAGNOSIS — Z80.3 FAMILY HISTORY OF BREAST CANCER: ICD-10-CM

## 2024-09-16 DIAGNOSIS — Z23 ENCOUNTER FOR IMMUNIZATION: Primary | ICD-10-CM

## 2024-09-16 DIAGNOSIS — N39.0 URINARY TRACT INFECTION WITHOUT HEMATURIA, SITE UNSPECIFIED: ICD-10-CM

## 2024-09-16 LAB
SL AMB  POCT GLUCOSE, UA: NEGATIVE
SL AMB LEUKOCYTE ESTERASE,UA: ABNORMAL
SL AMB POCT BILIRUBIN,UA: NEGATIVE
SL AMB POCT BLOOD,UA: NEGATIVE
SL AMB POCT CLARITY,UA: CLEAR
SL AMB POCT COLOR,UA: YELLOW
SL AMB POCT KETONES,UA: NEGATIVE
SL AMB POCT NITRITE,UA: NEGATIVE
SL AMB POCT PH,UA: 6
SL AMB POCT SPECIFIC GRAVITY,UA: 1.02
SL AMB POCT URINE PROTEIN: 15
SL AMB POCT UROBILINOGEN: 0.2

## 2024-09-16 PROCEDURE — 90746 HEPB VACCINE 3 DOSE ADULT IM: CPT | Performed by: FAMILY MEDICINE

## 2024-09-16 PROCEDURE — 99202 OFFICE O/P NEW SF 15 MIN: CPT | Performed by: FAMILY MEDICINE

## 2024-09-16 PROCEDURE — 81002 URINALYSIS NONAUTO W/O SCOPE: CPT | Performed by: FAMILY MEDICINE

## 2024-09-16 PROCEDURE — 87086 URINE CULTURE/COLONY COUNT: CPT | Performed by: FAMILY MEDICINE

## 2024-09-16 PROCEDURE — G0010 ADMIN HEPATITIS B VACCINE: HCPCS | Performed by: FAMILY MEDICINE

## 2024-09-16 RX ORDER — SULFAMETHOXAZOLE/TRIMETHOPRIM 800-160 MG
1 TABLET ORAL EVERY 12 HOURS SCHEDULED
Qty: 10 TABLET | Refills: 0 | Status: SHIPPED | OUTPATIENT
Start: 2024-09-16 | End: 2024-09-21

## 2024-09-16 NOTE — PROGRESS NOTES
Assessment/Plan:      Diagnoses and all orders for this visit:    Recurrent UTI  -     Ambulatory Referral to Urogynecology; Future  -     Urine culture; Future  -     Urine culture    Urinary tract infection without hematuria, site unspecified  -     sulfamethoxazole-trimethoprim (BACTRIM DS) 800-160 mg per tablet; Take 1 tablet by mouth every 12 (twelve) hours for 5 days  -     Cancel: Urine culture; Future  -     Cancel: POCT urine dip  -     Urine culture; Future  -     Urine culture  -     US kidney and bladder; Future    Primary hypertension    Skin lesion of right arm  -     Ambulatory Referral to Dermatology; Future    Frequent urination  -     POCT urine dip    Fatty liver disease, nonalcoholic    Acute cystitis without hematuria  -     sulfamethoxazole-trimethoprim (BACTRIM DS) 800-160 mg per tablet; Take 1 tablet by mouth every 12 (twelve) hours for 5 days  -     Cancel: Urine culture; Future  -     Cancel: POCT urine dip  -     Urine culture; Future  -     Urine culture    Family history of breast cancer          Subjective:     Patient ID: Felicitas Stevens is a 68 y.o. female.    68 yoF PMHx of recurrent UTI's (4th UTI of this year) presents with complaints of her typical UTI symptoms of dysuria, lower abdominal pain and low back pain. She denies any fever, chills, N/V, general malaise. She notes urine appears normal and is without abnormal smell otherwise. No other associated symptoms at this time.         Review of Systems   Constitutional:  Negative for chills and fever.   HENT:  Negative for ear pain and sore throat.    Eyes:  Negative for pain and visual disturbance.   Respiratory:  Negative for cough and shortness of breath.    Cardiovascular:  Negative for chest pain and palpitations.   Gastrointestinal:  Positive for abdominal pain (on palpation of the lower abdomen). Negative for abdominal distention, constipation, diarrhea, nausea and vomiting.   Genitourinary:  Positive for dysuria and  pelvic pain. Negative for decreased urine volume, difficulty urinating, flank pain, hematuria and vaginal pain.   Musculoskeletal:  Negative for arthralgias and back pain.   Skin:  Negative for color change and rash.   Neurological:  Negative for dizziness, light-headedness and headaches.   All other systems reviewed and are negative.        Objective:     Physical Exam  Constitutional:       General: She is not in acute distress.     Appearance: Normal appearance. She is not ill-appearing.   HENT:      Head: Normocephalic and atraumatic.      Right Ear: External ear normal.      Left Ear: External ear normal.      Nose: Nose normal.   Eyes:      Extraocular Movements: Extraocular movements intact.      Conjunctiva/sclera: Conjunctivae normal.   Cardiovascular:      Rate and Rhythm: Normal rate and regular rhythm.      Pulses: Normal pulses.      Heart sounds: Normal heart sounds.   Pulmonary:      Effort: Pulmonary effort is normal.      Breath sounds: Normal breath sounds. No wheezing, rhonchi or rales.   Abdominal:      General: Abdomen is flat. There is no distension.      Palpations: Abdomen is soft. There is no mass.      Tenderness: There is abdominal tenderness (lower abdomen/pelvis). There is right CVA tenderness. There is no guarding.   Musculoskeletal:      Right lower leg: No edema.      Left lower leg: No edema.   Skin:     General: Skin is warm and dry.   Neurological:      Mental Status: She is alert.      Motor: No weakness.      Gait: Gait normal.           Assessment:      Acute cystitis and UTI      Plan:  Plan:      1. Medications: nitrofurantoin  2. Maintain adequate hydration  3. Follow up if symptoms not improving, and prn.     Subjective:      Felicitas Stevens is a 68 y.o. female who complains of dysuria, pain in the periumbilical area, and suprapubic pressure for 3 days.  Patient also complains of back pain and headache. Patient denies fever and vaginal discharge.  Patient does have a  "history of recurrent UTI.  Patient does have a history of pyelonephritis.  The following portions of the patient's history were reviewed and updated as appropriate: allergies, current medications, past family history, past medical history, past social history, past surgical history, and problem list.    Review of Systems  Pertinent items are noted in HPI.      Objective:      BP (!) 174/88 (BP Location: Left arm, Patient Position: Sitting, Cuff Size: Adult)   Pulse 73   Temp (!) 97.4 °F (36.3 °C) (Tympanic)   Resp 16   Ht 5' 5\" (1.651 m)   Wt 65 kg (143 lb 3.2 oz)   LMP  (LMP Unknown)   SpO2 98%   BMI 23.83 kg/m²   General: alert and oriented, in no acute distress   Abdomen: soft, nondistended, and tenderness moderate in the lower abdomen in the lower abdomen   Back: right CVA tenderness   : defer exam     Laboratory:   Urine dipstick shows  15+ for leukocyte esterase.    Micro exam: not done.        "

## 2024-09-17 LAB — BACTERIA UR CULT: NORMAL

## 2024-09-19 ENCOUNTER — TELEPHONE (OUTPATIENT)
Age: 69
End: 2024-09-19

## 2024-09-19 PROBLEM — Z80.3 FAMILY HISTORY OF BREAST CANCER: Status: ACTIVE | Noted: 2024-09-19

## 2024-09-19 PROBLEM — N39.0 URINARY TRACT INFECTION WITHOUT HEMATURIA: Status: ACTIVE | Noted: 2024-09-19

## 2024-09-19 PROBLEM — L98.9 SKIN LESION OF RIGHT ARM: Status: ACTIVE | Noted: 2024-09-19

## 2024-09-19 NOTE — TELEPHONE ENCOUNTER
Patients daughter calling in to schedule her for urogynecology. She was given phone number of urogynecologist that patient was referred to. She will reach out to them to schedule. She will call us back in the future if needed.

## 2024-09-20 ENCOUNTER — TELEPHONE (OUTPATIENT)
Dept: INTERNAL MEDICINE CLINIC | Facility: CLINIC | Age: 69
End: 2024-09-20

## 2024-09-20 NOTE — TELEPHONE ENCOUNTER
----- Message from Everette Interiano DO sent at 9/19/2024 10:32 AM EDT -----  No bacterial growth in urine.

## 2024-09-20 NOTE — ASSESSMENT & PLAN NOTE
- Patient reporting UTI symptoms similar to what she has experienced in the past   - Notes having 4 UTI's over the past year  - Symptoms consisting of dysuria, lower abdomina/pelvis and low back pain/discomfort  - Denies any fever, chills, N/V, general malaise, change in urine color or smell  - Physical examination appreciable for lower abdominal/pelvis tenderness to palpation as well as right CVA tenderness on flank percussion  - Urine dipstick showing +15 leuks   - Concern for UTI w/ possible ascending pyelonephritis vs interstitial cystitis     Plan  - Start Bactrim 800-160 mg BID x5days   - Discussed possibly starting prophylactic measures with estrace cream however, patient notes family hx of unknown type of breast cancer  - F/u Urology

## 2024-10-01 ENCOUNTER — HOSPITAL ENCOUNTER (OUTPATIENT)
Dept: ULTRASOUND IMAGING | Facility: HOSPITAL | Age: 69
Discharge: HOME | End: 2024-10-01
Payer: COMMERCIAL

## 2024-10-01 DIAGNOSIS — N39.0 URINARY TRACT INFECTION WITHOUT HEMATURIA, SITE UNSPECIFIED: ICD-10-CM

## 2024-10-01 PROCEDURE — 76775 US EXAM ABDO BACK WALL LIM: CPT

## 2024-10-09 ENCOUNTER — CONSULT (OUTPATIENT)
Age: 69
End: 2024-10-09
Payer: COMMERCIAL

## 2024-10-09 VITALS
TEMPERATURE: 98 F | HEIGHT: 63 IN | HEART RATE: 85 BPM | SYSTOLIC BLOOD PRESSURE: 120 MMHG | BODY MASS INDEX: 25.8 KG/M2 | OXYGEN SATURATION: 98 % | DIASTOLIC BLOOD PRESSURE: 74 MMHG | WEIGHT: 145.6 LBS

## 2024-10-09 DIAGNOSIS — M81.0 AGE-RELATED OSTEOPOROSIS WITHOUT CURRENT PATHOLOGICAL FRACTURE: ICD-10-CM

## 2024-10-09 DIAGNOSIS — M47.816 LUMBAR SPONDYLOSIS: ICD-10-CM

## 2024-10-09 DIAGNOSIS — L85.3 DRY SKIN DERMATITIS: ICD-10-CM

## 2024-10-09 DIAGNOSIS — K74.3 PRIMARY BILIARY CHOLANGITIS (HCC): ICD-10-CM

## 2024-10-09 DIAGNOSIS — M35.01 SJOGREN'S SYNDROME WITH KERATOCONJUNCTIVITIS SICCA (HCC): Primary | ICD-10-CM

## 2024-10-09 PROCEDURE — 99204 OFFICE O/P NEW MOD 45 MIN: CPT | Performed by: PHYSICIAN ASSISTANT

## 2024-10-09 NOTE — PATIENT INSTRUCTIONS
"For dry mouth: xylitol gum and mints, Xylimelts lozenges, Biotene toothpaste, mouthwash    Patient Education     Ejercicios para la espalda   Conceptos Básicos   Redactado por los médicos y editores de [employer]   ¿Por qué necesito hacer ejercicios para la espalda? -- Los ejercicios para la espalda pueden ayudar a aliviar el dolor de espalda y podrían ayudar a prevenir bethany de espalda futuros. A regino plazo, es importante fortalecer los músculos de la parte baja de la espalda, las nalgas y el área del estómago. Estos son clarita \"músculos troncales\". Los ejercicios de estiramiento también son importantes para mantener los músculos flexibles.  A continuación se muestran algunos ejercicios de estiramiento y fortalecimiento que podrían ayudarle. Otras formas de movimiento también pueden ayudar a aliviar o prevenir el dolor de espalda. Por ejemplo, a algunas personas les gusta caminar, hacer ejercicio aeróbico o hacer yoga o juanjose chi. Lo más importante es  el cuerpo. Sandoval médico, enfermero o fisioterapeuta puede ayudarle a encontrar diferentes tipos de actividad que le shanon sagar.  ¿Qué ejercicios de estiramiento alli hacer? -- A continuación se muestran algunos ejemplos de ejercicios de estiramiento. [place] clarita músculos antes de estirar para ayudar a prevenir lesiones. Para calentar, puede caminar, trotar o andar en bicicleta camilla unos minutos.  Comience repitiendo cada james de estos estiramientos de 2 a 3 veces. Intente mantener cada estiramiento camilla 5 a 10 segundos y trate de hacer los estiramientos 2 a 3 veces al día. Respire lenta y profundamente mientras hace los ejercicios. Nunca rebote al hacer estiramientos.   Estiramiento elaine-alesia (figura 1) - Comience a cuatro patas en el suelo, con las semaj debajo de los hombros, las rodillas debajo de las caderas y la espalda plana. Shanna, meta la barbilla y contraiga los músculos del estómago mientras curva la espalda (cookie un \"elaine\"). Mantenga el estiramiento " "camilla unos 10 segundos. Descanse unos segundos mientras aplana la espalda. Luego, levante la barbilla y deje que el vientre y la espalda baja se hundan hacia el suelo (cookie sarwat \"alesia\"). Mantenga el estiramiento camilla unos 10 segundos.   Estiramientos simples de rodilla a pecho (figura 2) ? Mientras se acuesta boca arriba, flexione las rodillas con los pies apoyados en el suelo. Tire de sarwat rodilla hacia el pecho hasta que sienta un estiramiento en la parte baja de la espalda y en el área de los glúteos. Baje y repita con la otra rodilla. Si tiene problemas de rodilla, levante la rodilla agarrando la parte posterior del muslo en lugar de la parte delantera de la rodilla. También puede hacer onur ejercicio agarrando ambas rodillas al mismo tiempo.   Rotaciones del tronco inferior (figura 3) ? Mientras se acuesta boca arriba, flexione las rodillas con los pies apoyados en el suelo. Mantenga las rodillas y los tobillos juntos y luego déjelos caer hacia un lado. Mantenga ambos hombros tocando el suelo hasta que sienta un estiramiento en los músculos laterales de la espalda. Repita en el otro lado.   Estiramientos de la espalda baja sentado (figura 4) - Siéntese en sarwat silla con los pies separados aproximadamente al ancho de los hombros. Luego, inclínese hacia adelante hasta que sienta un estiramiento en la alonso lumbar.  ¿Qué ejercicios de fortalecimiento alli hacer? -- A continuación se muestran algunos ejemplos de ejercicios de fortalecimiento.  Empiece por hacer cada ejercicio 2 o 3 veces. Trabaje hasta hacer cada ejercicio 10 veces. Mantenga cada ejercicio camilla 3 a 5 segundos e intente realizarlos 2 a 3 veces al día. Nathaniel todos los ejercicios lentamente.   Apretones del omóplato (figura 5) - Junte los omóplatos en la parte superior de la espalda y manténgalo así camilla 3 a 5 segundos. También puede hacer estos 1 lado a la vez. Siéntese con buena postura y asegúrese de que clarita hombros no se levanten cuando nathaniel " "onur ejercicio. Relaje.   Inclinaciones pélvicas (figura 6) ? Acuéstese boca arriba con las rodillas flexionadas y los pies apoyados en el suelo. Apriete los músculos del abdomen y presione la parte baja de la espalda hacia el suelo. Relaje. Debería poder respirar cómodamente camilla onur ejercicio.   Levantamientos de cadera (figura 7) ? Acuéstese boca arriba con las rodillas flexionadas y los pies apoyados en el suelo. Apriete los músculos del abdomen, mantenga la espalda plana y levante las nalgas del suelo. Relaje. Debería sentir esto en clarita nalgas, no en bowers espalda baja.  ¿Qué más alli saber?    El ejercicio, incluido el estiramiento, puede resultar un poco incómodo, kathleen no debe sentir dolor himanshu o intenso. Si siente un dolor intenso, deje de hacer lo que está haciendo. Si el dolor intenso continúa, llame a bowers médico o enfermero.   No contenga la respiración cuando nathaniel ejercicio. Si tiende a contener la respiración, intente contar en voz jamila cuando nathaniel ejercicio. Si algún ejercicio le molesta, deténgase de inmediato.   Siempre entre en calor antes de hacer ejercicio. Los músculos calientes se estiran mucho más fácilmente que los músculos fríos. Estirar los músculos fríos puede provocar lesiones.   Hacer ejercicios antes de sarwat comida puede ser sarwat buena forma de adoptar sarwat rutina.  Todos los artículos se actualizan a medida que se descubre nueva evidencia y culmina nuestro proceso de evaluación por homólogos   Onur artículo se recuperó de UpToDate el: Apr 03, 2024.  Artículo [id number] Versión 2.0.es-419.1  Release: 32.2.4 - C32.92  © 2024 [facility] Todos los derechos reservados.  figura 1: Estiramiento elaine-alesia     Comience a cuatro patas en el suelo, con las semaj debajo de los hombros, las rodillas debajo de las caderas y la espalda plana. Shanna, meta la barbilla y contraiga los músculos del estómago mientras curva la espalda (cookie un \"elaine\"). Mantenga el estiramiento camilla unos 10 segundos. " "Descanse unos segundos mientras aplana la espalda. Luego, levante la barbilla y deje que el vientre y la espalda baja se hundan hacia el suelo (cookie sarwat \"alesia\"). Mantenga el estiramiento camilla unos 10 segundos.  Gráfico [id number] Versión 1.0  figura 2: Estiramiento simple de rodilla a pecho     Acuéstese boca arriba con las rodillas dobladas y apoye los pies en el suelo. Tire de sarwat rodilla hacia el pecho hasta que sienta un estiramiento en la parte baja de la espalda y en el área de los glúteos. Repita con la otra rodilla. Si tiene problemas de rodilla, levante la rodilla agarrando la parte posterior del muslo en lugar de la parte delantera de la rodilla. También puede hacer onur ejercicio agarrando ambas rodillas al mismo tiempo.  Gráfico [id number] Versión 1.0  figura 3: Rotación del tronco inferior     Mientras se acuesta boca arriba, doble las rodillas y apoye los pies en el suelo. Mantenga las piernas juntas y luego déjelas caer hacia un lado. Mantenga ambos hombros tocando el suelo hasta que sienta un estiramiento en los músculos laterales de la espalda. Repita en el otro lado.  Gráfico [id number] Versión 1.0  figura 4: Estiramiento de la espalda baja     Siéntese en sarwat silla con los pies separados aproximadamente al ancho de los hombros. Luego, inclínese hacia adelante hasta que sienta un estiramiento en la alonso lumbar.  Gráfico [id number] Versión 1.0  figura 5: Apretones del omóplato     Junte los omóplatos en la parte superior de la espalda y manténgalo así camilla 3 a 5 segundos. Asegúrese de estar sentado con sarwat buena postura y de que clarita hombros no se levanten cuando nathaniel onur ejercicio. Relaje.  Gráfico [id number] Versión 1.0  figura 6: Inclinaciones pélvicas     Acuéstese boca arriba con las rodillas dobladas y los pies apoyados en el suelo. Apriete los músculos del estómago y presione la parte baja de la espalda hacia el suelo. Relaje.  Gráfico [id number] Versión 1.0  figura 7: Levantamientos de cadera     Acuéstese " boca arriba con las rodillas dobladas y los pies apoyados en el suelo. Apriete los músculos del estómago y levante las nalgas del suelo. Relaje.  Gráfico [id number] Versión 1.0  Exención de responsabilidad y uso de la información del consumidor   Descargo de responsabilidad: esta información generalizada es un resumen limitado de información sobre el diagnóstico, el tratamiento y/o los medicamentos. No pretende ser exhaustiva y se debe utilizar cookie herramienta para ayudar al usuario a comprender y/o evaluar las posibles opciones de diagnóstico y tratamiento. No incluye toda la información sobre afecciones, tratamientos, medicamentos, efectos secundarios o riesgos puedan ser aplicables a un paciente específico. No tiene el propósito de servir cookie recomendación médica ni de sustituir la recomendación médica, el diagnóstico o el tratamiento de un profesional de atención médica que se base en el examen y la evaluación de onur profesional de la emerald respecto a las circunstancias específicas y únicas del paciente. Los pacientes deben hablar con un profesional de atención médica para obtener información completa sobre bowers emerald, cuestiones médicas y opciones de tratamiento, incluidos los riesgos o los beneficios relacionados con el uso de medicamentos. Esta información no certifica que los tratamientos o medicamentos nickolas seguros, eficaces o estén aprobados para tratar a un paciente específico. Up[employer]Date, Inc. y clarita afiliados renuncian a cualquier garantía o responsabilidad relacionada con esta información o el uso de la misma.El uso de esta información está sujeto a las Condiciones de uso, disponibles en https://[email or web address].com/en/know/clinical-effectiveness-terms. 2024© [employer] y clarita afiliados y/o licenciantes. Todos los derechos reservados.  Copyright   © 2024 [employer]Date, Inc. Todos los derechos reservados.

## 2024-10-09 NOTE — PROGRESS NOTES
Ambulatory Visit  Name: Felicitas Stevens      : 1955      MRN: [id number]  Encounter Provider: Alyse Law PA-C  Encounter Date: 10/9/2024   Encounter department: [facility] RHEUMATOLOGY [facility]    Assessment & Plan  Sjogren's syndrome with keratoconjunctivitis sicca (HCC)  History of Sjogren's syndrome.  Continue symptomatic treatment for dry eye and dry mouth symptoms.  I have recommended she try xylitol gum and mints along with XyliMelts and Biotene products for symptomatic relief of dry mouth symptoms.  I have also given her some additional lab work to recheck serologies and inflammatory markers.  Plan to follow-up in 4 to 6 months or sooner if needed.    Orders:    CBC and differential; Future    C-reactive protein; Future    Comprehensive metabolic panel; Future    RF Screen w/ Reflex to Titer; Future    Cyclic citrul peptide antibody, IgG; Future    [facility] Comprehensive Panel; Future    CK; Future    Sedimentation rate, automated; Future    MISCELLANEOUS LAB TEST; Future    Ambulatory Referral to Rheumatology    Lumbar spondylosis  History of lower back pain with a history of lumbar spondylosis.  Recommend updated x-rays of her lumbar spine.  We did discuss physical therapy however she is not interested at this time.  I have given her home exercise program to try as well.  Orders:    XR spine lumbar minimum 4 views non injury; Future    Age-related osteoporosis without current pathological fracture  History of osteoporosis.  Prolia was recommended by her PCP.  It does sound like an insurance investigation was done however she has not been back to discuss this and follow up yet.  Patient's daughter will reach out to PCPs office.  I have recommended that she initiate treatment for her osteoporosis.       Dry skin dermatitis    Orders:    Ambulatory Referral to Rheumatology    Primary biliary cholangitis (HCC)    Orders:    Ambulatory Referral to Rheumatology      History  of Present Illness     Felicitas Stevens is a 68 y.o. female.  She is here for new patient evaluation for history of Sjogren's.  She is a previous patient of Dr. Tipton at [facility] rheumatology.  She was last seen by rheumatology in April 2019.  She is currently using drops as needed for her dry eye symptoms.  She does have significant dry mouth symptoms and tries to drink water to help give her symptomatic relief.    She has a history of fibromyalgia.  She has never been on any medication for her fibromyalgia.  She is most bothered by some lower back pain and muscle tightness.  She does have a history of lumbar spondylosis.  She tries to stay active and walks regularly.  She uses Tylenol as needed for pain.    She has a history of biliary cirrhosis and follows with GI.  She has a history of gastroparesis as well. She has a history of hyperlipidemia, hypertension, and prediabetes.    She has no frequent fevers or chills.  She has no chest pain or shortness of breath.  She does note constipation.  She gets occasional numbness and tingling in her hands.  She has also noted dry patches in her ears.  She was given a topical cream to use for this which has made a significant difference.  She has had recurrent UTIs over the last year and will be seeing her gynecologist for further evaluation.    She has a history of osteoporosis.  Her most recent DEXA scan was done on 10/31/2023.  Lumbar spine T-score (L1, L3, L4) -2.0.  Left total hip T-score -1.9.  This is decreased 4.6% as compared to her previous scan.  Left femoral neck T-score -2.5.  Prolia was recommended by her PCP earlier this year.      Review of Systems   Constitutional:  Positive for fatigue. Negative for chills and fever.   HENT:  Negative for hearing loss, sore throat and tinnitus.         Dry mouth   Eyes:  Negative for pain and visual disturbance.        Dry eyes   Respiratory:  Negative for cough and shortness of breath.    Cardiovascular:  Negative for  chest pain and palpitations.   Gastrointestinal:  Positive for constipation. Negative for abdominal pain, nausea and vomiting.   Genitourinary:  Negative for difficulty urinating.   Musculoskeletal:  Positive for arthralgias, back pain and myalgias. Negative for gait problem, joint swelling, neck pain and neck stiffness.   Skin:  Negative for rash.   Neurological:  Positive for numbness (hands) and headaches. Negative for dizziness, seizures and weakness.   Psychiatric/Behavioral:  Negative for confusion, decreased concentration and sleep disturbance.      Current Outpatient Medications on File Prior to Visit   Medication Sig Dispense Refill    Cholecalciferol (RA Vitamin D-3) 25 MCG (1000 UT) tablet Take 1 tablet (1,000 Units total) by mouth daily 90 tablet 1    Collagen Hydrolysate, Bovine, POWD Use      dicyclomine (BENTYL) 10 mg capsule take 1 capsule by mouth four times a day before meals and at bedtime 120 capsule 5    esomeprazole (NexIUM) 40 MG capsule take 1 capsule by mouth once daily 30 capsule 5    hydrocortisone 1 % cream Apply topically 3 (three) times a day 56 g 1    lidocaine (XYLOCAINE) 5 % ointment Apply topically as needed for mild pain 35.44 g 0    rosuvastatin (CRESTOR) 10 MG tablet Take 1 tablet (10 mg total) by mouth daily 100 tablet 3    ursodiol (ACTIGALL) 300 mg capsule take 1 capsule by mouth twice a day 180 capsule 1    calcium carbonate (OS-MOLLY) 600 MG tablet Take 1 tablet (600 mg total) by mouth daily (Patient not taking: Reported on 5/6/2024) 90 tablet 1    famotidine (PEPCID) 20 mg tablet Take 1 tablet (20 mg total) by mouth daily at bedtime (Patient not taking: Reported on 9/16/2024) 90 tablet 3    hydrocortisone (ANUSOL-HC) 2.5 % rectal cream Apply topically 2 (two) times a day (Patient not taking: Reported on 9/16/2024) 28 g 0     No current facility-administered medications on file prior to visit.          Objective     /74 (BP Location: Right arm, Patient Position: Sitting,  "Cuff Size: Adult)   Pulse 85   Temp 98 °F (36.7 °C) (Temporal)   Ht 5' 3\" (1.6 m)   Wt 66 kg (145 lb 9.6 oz)   LMP  (LMP Unknown)   SpO2 98%   BMI 25.79 kg/m²     Physical Exam  Constitutional:       Appearance: Normal appearance.   Cardiovascular:      Rate and Rhythm: Normal rate and regular rhythm.   Pulmonary:      Breath sounds: Normal breath sounds.   Musculoskeletal:         General: No swelling or tenderness.   Skin:     General: Skin is warm and dry.   Neurological:      General: No focal deficit present.      Mental Status: She is alert.           Dragon Dictation software was used to dictate this note. It may contain errors with dictating incorrect words/spelling. Please contact provider directly for any questions.    "

## 2024-10-09 NOTE — ASSESSMENT & PLAN NOTE
History of osteoporosis.  Prolia was recommended by her PCP.  It does sound like an insurance investigation was done however she has not been back to discuss this and follow up yet.  Patient's daughter will reach out to PCPs office.  I have recommended that she initiate treatment for her osteoporosis.

## 2024-10-09 NOTE — ASSESSMENT & PLAN NOTE
History of lower back pain with a history of lumbar spondylosis.  Recommend updated x-rays of her lumbar spine.  We did discuss physical therapy however she is not interested at this time.  I have given her home exercise program to try as well.  Orders:    XR spine lumbar minimum 4 views non injury; Future

## 2024-10-09 NOTE — ASSESSMENT & PLAN NOTE
History of Sjogren's syndrome.  Continue symptomatic treatment for dry eye and dry mouth symptoms.  I have recommended she try xylitol gum and mints along with XyliMelts and Biotene products for symptomatic relief of dry mouth symptoms.  I have also given her some additional lab work to recheck serologies and inflammatory markers.  Plan to follow-up in 4 to 6 months or sooner if needed.    Orders:    CBC and differential; Future    C-reactive protein; Future    Comprehensive metabolic panel; Future    RF Screen w/ Reflex to Titer; Future    Cyclic citrul peptide antibody, IgG; Future    [facility] Comprehensive Panel; Future    CK; Future    Sedimentation rate, automated; Future    MISCELLANEOUS LAB TEST; Future    Ambulatory Referral to Rheumatology

## 2024-10-10 NOTE — PROGRESS NOTES
Subjective      Felicitas Stevens is a 68 y.o. female who presents for annual GYN exam.     GYN:  S/P hysterectomy.   Denies vaginal discharge, labial erythema or lesions, dyspareunia.  Patient is not sexually active.      OB:  OB History    Para Term  AB Living   3 3       3   SAB IAB Ectopic Multiple Live Births           3      # Outcome Date GA Lbr Fredi/2nd Weight Sex Type Anes PTL Lv   3 Para            2 Para            1 Para                  :  Reports frequent UTIs, 4 in the past year. Denies current dysuria, urinary frequency or urgency at this time.   She has referral to Urology.  Denies current hematuria, flank pain, incontinence.    Breast:  Denies breast mass, skin changes, dimpling, reddening, nipple retraction.  Denies breast discharge.  Patient does  have a family history of breast, endometrial, colon, or ovarian ca.     Cancer-related family history includes Breast cancer in her sister; Ovarian cancer in her sister.    Past Medical History:   Diagnosis Date    Acid reflux disease     Anemia     Anxiety     Cirrhosis (HCC)     Depression     Fibromyalgia     Fibromyalgia     Gastritis     GERD (gastroesophageal reflux disease)     High blood pressure     Hyperlipidemia     Hypertension     Rheumatoid arthritis (HCC)     Urinary tract infection        Past Surgical History:   Procedure Laterality Date    BREAST BIOPSY Right         CHOLECYSTECTOMY      COLONOSCOPY      Fiberoptic    HYSTERECTOMY      UPPER GASTROINTESTINAL ENDOSCOPY      therapeutic         General:  Safety: Reports feeling safe at home    Social History     Tobacco Use    Smoking status: Never    Smokeless tobacco: Never    Tobacco comments:     Denied Tobacco Use   Vaping Use    Vaping status: Never Used   Substance Use Topics    Alcohol use: No    Drug use: No       Screening:  Cervical cancer: last pap smear in 2018. Results were nilm/hpv-.   Breast cancer: last mammogram in 2023. Results were  "birads1.  Colon cancer: last colonoscopy in 11/09/2020     Review of Systems   Constitutional:  Negative for fatigue.   Eyes:  Negative for photophobia and visual disturbance.   Respiratory:  Negative for cough and shortness of breath.    Cardiovascular:  Negative for chest pain and palpitations.   Gastrointestinal:  Negative for abdominal pain, blood in stool, constipation, diarrhea, nausea and rectal pain.   Genitourinary:  Negative for dyspareunia, dysuria, flank pain, frequency, genital sores, menstrual problem, pelvic pain, urgency, vaginal bleeding, vaginal discharge and vaginal pain.   Musculoskeletal:  Negative for arthralgias and back pain.   Skin:  Negative for rash.   Neurological:  Negative for weakness and headaches.          Objective      /90 (BP Location: Left arm, Patient Position: Sitting, Cuff Size: Standard)   Ht 5' 3\" (1.6 m)   Wt 64.9 kg (143 lb)   LMP  (LMP Unknown)   BMI 25.33 kg/m²   Physical Exam  Vitals and nursing note reviewed.   Constitutional:       Appearance: Normal appearance.   HENT:      Head: Normocephalic.   Eyes:      Conjunctiva/sclera: Conjunctivae normal.   Cardiovascular:      Rate and Rhythm: Normal rate and regular rhythm.      Heart sounds: Normal heart sounds.   Pulmonary:      Effort: Pulmonary effort is normal.      Breath sounds: Normal breath sounds.   Chest:   Breasts:     Right: Normal. No inverted nipple, mass, nipple discharge, skin change or tenderness.      Left: Normal. No inverted nipple, mass, nipple discharge, skin change or tenderness.   Abdominal:      General: Abdomen is flat.      Palpations: Abdomen is soft. There is no mass.      Tenderness: There is no abdominal tenderness. There is no right CVA tenderness or left CVA tenderness.   Genitourinary:     General: Normal vulva.      Exam position: Lithotomy position.      Pubic Area: No rash or pubic lice.       Labia:         Right: No rash or tenderness.         Left: No rash or tenderness.  "      Urethra: No prolapse or urethral pain.      Vagina: Normal. No vaginal discharge.      Adnexa: Right adnexa normal and left adnexa normal.        Right: No mass or tenderness.          Left: No mass or tenderness.        Comments: Surgically absent cervix and uterus.  Perineum is narrow. Urethra and vaginal grossly normal. No noticeable prolapse or lesions of the urethra.   Musculoskeletal:         General: Normal range of motion.      Cervical back: Normal range of motion. No tenderness.      Right lower leg: No edema.      Left lower leg: No edema.   Lymphadenopathy:      Cervical: No cervical adenopathy.      Upper Body:      Right upper body: No supraclavicular or axillary adenopathy.      Left upper body: No supraclavicular or axillary adenopathy.      Lower Body: No right inguinal adenopathy. No left inguinal adenopathy.   Skin:     General: Skin is warm and dry.      Findings: No rash.   Neurological:      Mental Status: She is alert and oriented to person, place, and time.   Psychiatric:         Mood and Affect: Mood normal.         Behavior: Behavior normal.         Thought Content: Thought content normal.         Judgment: Judgment normal.                 Assessment/Plan  Problem List Items Addressed This Visit    None  Visit Diagnoses       Well woman exam    -  Primary            Reviewed  GYN concerns today of frequent UTIs. Advised to f/u with urology or Urogyn. Reviewed utilization of Estrace vaginal cream to help with vulvar atrophy and decrease risk of UTIs. Patient and family will consider. Education provided in AVS.  Cervical cancer screening: Hx hysterectomy  Breast Cancer screening: UTD; 2024. Appointment scheduled in November 2024 with [facility]  Colon cancer Screening: UTD  Reviewed healthy lifestyle and safe sex practices  RTO for annual exam or PRN      CONNIE Ahmadi  OB/GYN  10/14/2024  10:33 AM

## 2024-10-14 ENCOUNTER — CLINICAL SUPPORT (OUTPATIENT)
Dept: FAMILY MEDICINE CLINIC | Facility: CLINIC | Age: 69
End: 2024-10-14
Payer: COMMERCIAL

## 2024-10-14 ENCOUNTER — ANNUAL EXAM (OUTPATIENT)
Dept: OBGYN CLINIC | Facility: CLINIC | Age: 69
End: 2024-10-14
Payer: COMMERCIAL

## 2024-10-14 VITALS
SYSTOLIC BLOOD PRESSURE: 146 MMHG | BODY MASS INDEX: 25.34 KG/M2 | HEIGHT: 63 IN | DIASTOLIC BLOOD PRESSURE: 90 MMHG | WEIGHT: 143 LBS

## 2024-10-14 DIAGNOSIS — Z01.419 WELL WOMAN EXAM: Primary | ICD-10-CM

## 2024-10-14 DIAGNOSIS — M81.0 AGE-RELATED OSTEOPOROSIS WITHOUT CURRENT PATHOLOGICAL FRACTURE: Primary | ICD-10-CM

## 2024-10-14 PROCEDURE — G0101 CA SCREEN;PELVIC/BREAST EXAM: HCPCS

## 2024-10-19 PROBLEM — N39.0 RECURRENT UTI: Status: RESOLVED | Noted: 2024-09-19 | Resolved: 2024-10-19

## 2024-10-22 ENCOUNTER — HOSPITAL ENCOUNTER (OUTPATIENT)
Dept: RADIOLOGY | Facility: HOSPITAL | Age: 69
Discharge: HOME | End: 2024-10-22
Payer: COMMERCIAL

## 2024-10-22 ENCOUNTER — APPOINTMENT (OUTPATIENT)
Dept: LAB | Facility: CLINIC | Age: 69
End: 2024-10-22
Payer: COMMERCIAL

## 2024-10-22 DIAGNOSIS — M35.01 SJOGREN'S SYNDROME WITH KERATOCONJUNCTIVITIS SICCA (HCC): ICD-10-CM

## 2024-10-22 DIAGNOSIS — R73.03 PREDIABETES: ICD-10-CM

## 2024-10-22 DIAGNOSIS — M47.816 LUMBAR SPONDYLOSIS: ICD-10-CM

## 2024-10-22 DIAGNOSIS — K76.89 HEPATIC CYST: ICD-10-CM

## 2024-10-22 DIAGNOSIS — E78.2 MIXED HYPERLIPIDEMIA: ICD-10-CM

## 2024-10-22 DIAGNOSIS — I10 PRIMARY HYPERTENSION: ICD-10-CM

## 2024-10-22 LAB
ALBUMIN SERPL BCG-MCNC: 4.1 G/DL (ref 3.5–5)
ALP SERPL-CCNC: 95 U/L (ref 34–104)
ALT SERPL W P-5'-P-CCNC: 13 U/L (ref 7–52)
ANA SER QL IA: POSITIVE
ANION GAP SERPL CALCULATED.3IONS-SCNC: 5 MMOL/L (ref 4–13)
AST SERPL W P-5'-P-CCNC: 15 U/L (ref 13–39)
BASOPHILS # BLD AUTO: 0.04 THOUSANDS/ΜL (ref 0–0.1)
BASOPHILS NFR BLD AUTO: 0 % (ref 0–1)
BILIRUB SERPL-MCNC: 0.65 MG/DL (ref 0.2–1)
BILIRUB UR QL STRIP: NEGATIVE
BUN SERPL-MCNC: 13 MG/DL (ref 5–25)
CALCIUM SERPL-MCNC: 8.5 MG/DL (ref 8.4–10.2)
CHLORIDE SERPL-SCNC: 108 MMOL/L (ref 96–108)
CHOLEST SERPL-MCNC: 167 MG/DL
CK SERPL-CCNC: 41 U/L (ref 26–192)
CLARITY UR: CLEAR
CO2 SERPL-SCNC: 27 MMOL/L (ref 21–32)
COLOR UR: NORMAL
CREAT SERPL-MCNC: 0.7 MG/DL (ref 0.6–1.3)
CREAT UR-MCNC: 126.7 MG/DL
CRP SERPL QL: 1.9 MG/L
EOSINOPHIL # BLD AUTO: 0.1 THOUSAND/ΜL (ref 0–0.61)
EOSINOPHIL NFR BLD AUTO: 1 % (ref 0–6)
ERYTHROCYTE [DISTWIDTH] IN BLOOD BY AUTOMATED COUNT: 13.2 % (ref 11.6–15.1)
ERYTHROCYTE [SEDIMENTATION RATE] IN BLOOD: 44 MM/HOUR (ref 0–29)
EST. AVERAGE GLUCOSE BLD GHB EST-MCNC: 114 MG/DL
GFR SERPL CREATININE-BSD FRML MDRD: 89 ML/MIN/1.73SQ M
GLUCOSE P FAST SERPL-MCNC: 89 MG/DL (ref 65–99)
GLUCOSE UR STRIP-MCNC: NEGATIVE MG/DL
HBA1C MFR BLD: 5.6 %
HCT VFR BLD AUTO: 38.5 % (ref 34.8–46.1)
HDLC SERPL-MCNC: 37 MG/DL
HGB BLD-MCNC: 12 G/DL (ref 11.5–15.4)
HGB UR QL STRIP.AUTO: NEGATIVE
IMM GRANULOCYTES # BLD AUTO: 0.02 THOUSAND/UL (ref 0–0.2)
IMM GRANULOCYTES NFR BLD AUTO: 0 % (ref 0–2)
KETONES UR STRIP-MCNC: NEGATIVE MG/DL
LDLC SERPL CALC-MCNC: 103 MG/DL (ref 0–100)
LEUKOCYTE ESTERASE UR QL STRIP: NEGATIVE
LYMPHOCYTES # BLD AUTO: 2.95 THOUSANDS/ΜL (ref 0.6–4.47)
LYMPHOCYTES NFR BLD AUTO: 32 % (ref 14–44)
MCH RBC QN AUTO: 29.9 PG (ref 26.8–34.3)
MCHC RBC AUTO-ENTMCNC: 31.2 G/DL (ref 31.4–37.4)
MCV RBC AUTO: 96 FL (ref 82–98)
MICROALBUMIN UR-MCNC: 28.8 MG/L
MICROALBUMIN/CREAT 24H UR: 23 MG/G CREATININE (ref 0–30)
MISCELLANEOUS LAB TEST RESULT: NORMAL
MONOCYTES # BLD AUTO: 1.08 THOUSAND/ΜL (ref 0.17–1.22)
MONOCYTES NFR BLD AUTO: 12 % (ref 4–12)
NEUTROPHILS # BLD AUTO: 5.17 THOUSANDS/ΜL (ref 1.85–7.62)
NEUTS SEG NFR BLD AUTO: 55 % (ref 43–75)
NITRITE UR QL STRIP: NEGATIVE
NONHDLC SERPL-MCNC: 130 MG/DL
NRBC BLD AUTO-RTO: 0 /100 WBCS
PH UR STRIP.AUTO: 6.5 [PH]
PLATELET # BLD AUTO: 359 THOUSANDS/UL (ref 149–390)
PMV BLD AUTO: 10.3 FL (ref 8.9–12.7)
POTASSIUM SERPL-SCNC: 4.5 MMOL/L (ref 3.5–5.3)
PROT SERPL-MCNC: 8 G/DL (ref 6.4–8.4)
PROT UR STRIP-MCNC: NEGATIVE MG/DL
RBC # BLD AUTO: 4.02 MILLION/UL (ref 3.81–5.12)
RHEUMATOID FACT SER QL LA: NEGATIVE
SODIUM SERPL-SCNC: 140 MMOL/L (ref 135–147)
SP GR UR STRIP.AUTO: 1.02 (ref 1–1.03)
TRIGL SERPL-MCNC: 134 MG/DL
UROBILINOGEN UR STRIP-ACNC: <2 MG/DL
WBC # BLD AUTO: 9.36 THOUSAND/UL (ref 4.31–10.16)

## 2024-10-22 PROCEDURE — 85652 RBC SED RATE AUTOMATED: CPT

## 2024-10-22 PROCEDURE — 83036 HEMOGLOBIN GLYCOSYLATED A1C: CPT

## 2024-10-22 PROCEDURE — 86038 ANTINUCLEAR ANTIBODIES: CPT

## 2024-10-22 PROCEDURE — 82043 UR ALBUMIN QUANTITATIVE: CPT

## 2024-10-22 PROCEDURE — 86140 C-REACTIVE PROTEIN: CPT

## 2024-10-22 PROCEDURE — 81003 URINALYSIS AUTO W/O SCOPE: CPT

## 2024-10-22 PROCEDURE — 80061 LIPID PANEL: CPT

## 2024-10-22 PROCEDURE — 85025 COMPLETE CBC W/AUTO DIFF WBC: CPT

## 2024-10-22 PROCEDURE — 82550 ASSAY OF CK (CPK): CPT

## 2024-10-22 PROCEDURE — 86430 RHEUMATOID FACTOR TEST QUAL: CPT

## 2024-10-22 PROCEDURE — 72110 X-RAY EXAM L-2 SPINE 4/>VWS: CPT

## 2024-10-22 PROCEDURE — 86039 ANTINUCLEAR ANTIBODIES (ANA): CPT

## 2024-10-22 PROCEDURE — 86200 CCP ANTIBODY: CPT

## 2024-10-22 PROCEDURE — 82570 ASSAY OF URINE CREATININE: CPT

## 2024-10-22 PROCEDURE — 80053 COMPREHEN METABOLIC PANEL: CPT

## 2024-10-22 PROCEDURE — 36415 COLL VENOUS BLD VENIPUNCTURE: CPT

## 2024-10-23 ENCOUNTER — TELEPHONE (OUTPATIENT)
Age: 69
End: 2024-10-23

## 2024-10-23 LAB
ANA HOMOGEN SER QL IF: NORMAL
ANA HOMOGEN TITR SER: NORMAL {TITER}

## 2024-10-23 NOTE — TELEPHONE ENCOUNTER
I cannot find the MRI results, only the xray results- can you please call mom and find out when and where she had the scan done- thanks   Patient's daughter called back she said she missed call.  Relayed message from Dr. Musa from labs.     Thank you

## 2024-10-25 LAB — CCP AB SER IA-ACNC: 1.1

## 2024-10-27 ENCOUNTER — APPOINTMENT (EMERGENCY)
Dept: CT IMAGING | Facility: HOSPITAL | Age: 69
End: 2024-10-27
Payer: COMMERCIAL

## 2024-10-27 ENCOUNTER — HOSPITAL ENCOUNTER (EMERGENCY)
Facility: HOSPITAL | Age: 69
Discharge: HOME | End: 2024-10-27
Attending: EMERGENCY MEDICINE
Payer: COMMERCIAL

## 2024-10-27 VITALS
HEART RATE: 91 BPM | TEMPERATURE: 97.7 F | RESPIRATION RATE: 18 BRPM | DIASTOLIC BLOOD PRESSURE: 79 MMHG | SYSTOLIC BLOOD PRESSURE: 147 MMHG | OXYGEN SATURATION: 99 %

## 2024-10-27 DIAGNOSIS — R11.2 NAUSEA & VOMITING: ICD-10-CM

## 2024-10-27 DIAGNOSIS — R10.30 LOWER ABDOMINAL PAIN: Primary | ICD-10-CM

## 2024-10-27 LAB
ALBUMIN SERPL BCG-MCNC: 4.3 G/DL (ref 3.5–5)
ALP SERPL-CCNC: 102 U/L (ref 34–104)
ALT SERPL W P-5'-P-CCNC: 16 U/L (ref 7–52)
ANION GAP SERPL CALCULATED.3IONS-SCNC: 5 MMOL/L (ref 4–13)
ANION GAP SERPL CALCULATED.3IONS-SCNC: 7 MMOL/L (ref 4–13)
AST SERPL W P-5'-P-CCNC: 29 U/L (ref 13–39)
BACTERIA UR QL AUTO: ABNORMAL /HPF
BASOPHILS # BLD AUTO: 0.05 THOUSANDS/ΜL (ref 0–0.1)
BASOPHILS NFR BLD AUTO: 0 % (ref 0–1)
BILIRUB SERPL-MCNC: 0.61 MG/DL (ref 0.2–1)
BILIRUB UR QL STRIP: NEGATIVE
BUN SERPL-MCNC: 15 MG/DL (ref 5–25)
BUN SERPL-MCNC: 16 MG/DL (ref 5–25)
CALCIUM SERPL-MCNC: 8.8 MG/DL (ref 8.4–10.2)
CALCIUM SERPL-MCNC: 9.2 MG/DL (ref 8.4–10.2)
CHLORIDE SERPL-SCNC: 106 MMOL/L (ref 96–108)
CHLORIDE SERPL-SCNC: 109 MMOL/L (ref 96–108)
CLARITY UR: CLEAR
CO2 SERPL-SCNC: 25 MMOL/L (ref 21–32)
CO2 SERPL-SCNC: 25 MMOL/L (ref 21–32)
COLOR UR: YELLOW
CREAT SERPL-MCNC: 0.76 MG/DL (ref 0.6–1.3)
CREAT SERPL-MCNC: 0.83 MG/DL (ref 0.6–1.3)
EOSINOPHIL # BLD AUTO: 0.09 THOUSAND/ΜL (ref 0–0.61)
EOSINOPHIL NFR BLD AUTO: 1 % (ref 0–6)
ERYTHROCYTE [DISTWIDTH] IN BLOOD BY AUTOMATED COUNT: 13.2 % (ref 11.6–15.1)
GFR SERPL CREATININE-BSD FRML MDRD: 73 ML/MIN/1.73SQ M
GFR SERPL CREATININE-BSD FRML MDRD: 81 ML/MIN/1.73SQ M
GLUCOSE SERPL-MCNC: 105 MG/DL (ref 65–140)
GLUCOSE SERPL-MCNC: 151 MG/DL (ref 65–140)
GLUCOSE UR STRIP-MCNC: NEGATIVE MG/DL
HCT VFR BLD AUTO: 40.9 % (ref 34.8–46.1)
HGB BLD-MCNC: 13.1 G/DL (ref 11.5–15.4)
HGB UR QL STRIP.AUTO: NEGATIVE
IMM GRANULOCYTES # BLD AUTO: 0.02 THOUSAND/UL (ref 0–0.2)
IMM GRANULOCYTES NFR BLD AUTO: 0 % (ref 0–2)
KETONES UR STRIP-MCNC: NEGATIVE MG/DL
LEUKOCYTE ESTERASE UR QL STRIP: NEGATIVE
LIPASE SERPL-CCNC: 27 U/L (ref 11–82)
LYMPHOCYTES # BLD AUTO: 3.06 THOUSANDS/ΜL (ref 0.6–4.47)
LYMPHOCYTES NFR BLD AUTO: 27 % (ref 14–44)
MCH RBC QN AUTO: 30.4 PG (ref 26.8–34.3)
MCHC RBC AUTO-ENTMCNC: 32 G/DL (ref 31.4–37.4)
MCV RBC AUTO: 95 FL (ref 82–98)
MONOCYTES # BLD AUTO: 0.67 THOUSAND/ΜL (ref 0.17–1.22)
MONOCYTES NFR BLD AUTO: 6 % (ref 4–12)
MUCOUS THREADS UR QL AUTO: ABNORMAL
NEUTROPHILS # BLD AUTO: 7.31 THOUSANDS/ΜL (ref 1.85–7.62)
NEUTS SEG NFR BLD AUTO: 66 % (ref 43–75)
NITRITE UR QL STRIP: NEGATIVE
NON-SQ EPI CELLS URNS QL MICRO: ABNORMAL /HPF
NRBC BLD AUTO-RTO: 0 /100 WBCS
PH UR STRIP.AUTO: 6.5 [PH]
PLATELET # BLD AUTO: 355 THOUSANDS/UL (ref 149–390)
PMV BLD AUTO: 9.8 FL (ref 8.9–12.7)
POTASSIUM SERPL-SCNC: 4 MMOL/L (ref 3.5–5.3)
POTASSIUM SERPL-SCNC: 5.6 MMOL/L (ref 3.5–5.3)
PROT SERPL-MCNC: 8.4 G/DL (ref 6.4–8.4)
PROT UR STRIP-MCNC: ABNORMAL MG/DL
RBC # BLD AUTO: 4.31 MILLION/UL (ref 3.81–5.12)
RBC #/AREA URNS AUTO: ABNORMAL /HPF
SODIUM SERPL-SCNC: 138 MMOL/L (ref 135–147)
SODIUM SERPL-SCNC: 139 MMOL/L (ref 135–147)
SP GR UR STRIP.AUTO: 1.02 (ref 1–1.03)
UROBILINOGEN UR STRIP-ACNC: <2 MG/DL
WBC # BLD AUTO: 11.2 THOUSAND/UL (ref 4.31–10.16)
WBC #/AREA URNS AUTO: ABNORMAL /HPF

## 2024-10-27 PROCEDURE — 81001 URINALYSIS AUTO W/SCOPE: CPT | Performed by: EMERGENCY MEDICINE

## 2024-10-27 PROCEDURE — 74177 CT ABD & PELVIS W/CONTRAST: CPT

## 2024-10-27 PROCEDURE — 83690 ASSAY OF LIPASE: CPT | Performed by: EMERGENCY MEDICINE

## 2024-10-27 PROCEDURE — 96374 THER/PROPH/DIAG INJ IV PUSH: CPT

## 2024-10-27 PROCEDURE — 99285 EMERGENCY DEPT VISIT HI MDM: CPT | Performed by: EMERGENCY MEDICINE

## 2024-10-27 PROCEDURE — 85025 COMPLETE CBC W/AUTO DIFF WBC: CPT | Performed by: EMERGENCY MEDICINE

## 2024-10-27 PROCEDURE — 99284 EMERGENCY DEPT VISIT MOD MDM: CPT

## 2024-10-27 PROCEDURE — 80053 COMPREHEN METABOLIC PANEL: CPT | Performed by: EMERGENCY MEDICINE

## 2024-10-27 PROCEDURE — 80048 BASIC METABOLIC PNL TOTAL CA: CPT | Performed by: EMERGENCY MEDICINE

## 2024-10-27 PROCEDURE — 36415 COLL VENOUS BLD VENIPUNCTURE: CPT

## 2024-10-27 RX ORDER — DROPERIDOL 2.5 MG/ML
0.62 INJECTION, SOLUTION INTRAMUSCULAR; INTRAVENOUS ONCE
Status: COMPLETED | OUTPATIENT
Start: 2024-10-27 | End: 2024-10-27

## 2024-10-27 RX ADMIN — IOHEXOL 70 ML: 350 INJECTION, SOLUTION INTRAVENOUS at 14:09

## 2024-10-27 RX ADMIN — DROPERIDOL 0.62 MG: 2.5 INJECTION, SOLUTION INTRAMUSCULAR; INTRAVENOUS at 12:33

## 2024-10-27 NOTE — DISCHARGE INSTRUCTIONS
Please come back to the ER if you are having new or worsening symptoms including worsening abdominal pain, inability to tolerate food/fluids, chest pain, shortness of breath, or other worrisome symptoms.    Please follow-up with your gastroenterologist, Dr. Osorio, within 1 week.

## 2024-10-27 NOTE — ED ATTENDING ATTESTATION
10/27/2024  I, Monse Alvarado MD, saw and evaluated the patient. I have discussed the patient with the resident/non-physician practitioner and agree with the resident's/non-physician practitioner's findings, Plan of Care, and MDM as documented in the resident's/non-physician practitioner's note, except where noted. All available labs and Radiology studies were reviewed.  I was present for key portions of any procedure(s) performed by the resident/non-physician practitioner and I was immediately available to provide assistance.       At this point I agree with the current assessment done in the Emergency Department.  I have conducted an independent evaluation of this patient a history and physical is as follows:    68-year-old female with history of gastroparesis.  Patient reports chronic constant abdominal pain at baseline but today at around 9 AM had sudden onset of worsening pain across her lower abdomen.  Feels different than pain that she typically experiences from her gastroparesis.  Reports nausea and 2 episodes of nonbloody nonbilious emesis.  Reports 4 episodes of nonbloody nonblack diarrhea.  No fevers, chills, or systemic symptoms.  Denies urinary complaints.    Physical Exam  Vitals and nursing note reviewed.   Constitutional:       General: She is not in acute distress.     Appearance: She is well-developed. She is not ill-appearing, toxic-appearing or diaphoretic.   HENT:      Head: Normocephalic and atraumatic.      Right Ear: External ear normal.      Left Ear: External ear normal.      Nose: Nose normal.   Eyes:      Conjunctiva/sclera: Conjunctivae normal.   Cardiovascular:      Rate and Rhythm: Normal rate and regular rhythm.      Pulses: Normal pulses.      Heart sounds: Normal heart sounds. No murmur heard.     No friction rub. No gallop.   Pulmonary:      Effort: Pulmonary effort is normal. No respiratory distress.      Breath sounds: Normal breath sounds. No wheezing or rales.   Abdominal:       General: Bowel sounds are normal. There is no distension.      Palpations: Abdomen is soft.      Tenderness: There is abdominal tenderness (Mild tenderness to deep palpation across the lower abdomen to the LLQ, RLQ, and surprapubic regions. No CVA tenderness. No TTP over the epigastrium or LUQ.). There is no guarding.   Musculoskeletal:         General: No deformity. Normal range of motion.      Cervical back: Normal range of motion and neck supple.      Right lower leg: No edema.      Left lower leg: No edema.   Skin:     General: Skin is warm and dry.   Neurological:      Mental Status: She is alert and oriented to person, place, and time.      Motor: No abnormal muscle tone.   Psychiatric:         Mood and Affect: Mood normal.           ED Course  ED Course as of 10/27/24 1715   Sun Oct 27, 2024   1237 Mild leukocytosis, possibly in the setting of vomiting and but will obtain CT scan of the abdomen pelvis as this pain feels different than her typical pain associated with gastroparesis and she has tenderness to palpation to the bilateral lower quadrants on exam.  No epigastric tenderness on exam.  CMP is hemolyzed with potassium of 5.6, doubt true hyperkalemia.   1527 Patient feels symptomatically improved following droperidol. CT scan of the abdomen pelvis shows possible lower proctitis. The patient denies pain with defecation, change in her stool, or mucoid discharge and proctitis would not explain her abdominal discomfort. As she feels symptomatically improved, will trial oral intake. If she is able to tolerate oral will discharge with recommendation for f/u with her gastroenterologist. Return precautions discussed.          Critical Care Time  Procedures

## 2024-10-27 NOTE — ED PROVIDER NOTES
"Time reflects when diagnosis was documented in both MDM as applicable and the Disposition within this note       Time User Action Codes Description Comment    10/27/2024  3:15 PM Manuel Thomas [R10.30] Lower abdominal pain     10/27/2024  3:15 PM Manuel Thomas [R11.2] Nausea & vomiting           ED Disposition       ED Disposition   Discharge    Condition   Stable    Date/Time   Sun Oct 27, 2024  3:30 PM    Comment   Felicitas Stevens discharge to home/self care.                   Assessment & Plan       Medical Decision Making  Patient is a 68-year-old female with history of gastroparesis who presented to the ED for lower abdominal pain.  Patient stated that she initially started having worsening of her typical abdominal pain at approximately 9 AM this morning.  States that the pain was a 10/10 and was excruciating, causing her not to be able to move at all due to it.  Patient stated this was also accompanied with nausea as well as 2 instances of nonbloody emesis and a few instances of diarrhea.  She stated that she has not had any p.o. intake this morning.  No appetite.  Denies any unusual recent oral intakes.  She also denies any recent fevers, chills, or other systemic symptoms.  Patient states that she was in her usual state of health when she woke up this morning as well as yesterday.  States that she typically has gastroparesis pain that is mild but constant, but the pain today feels different and much more severe.  Currently the patient endorses 7/10 abdominal pain in the same distribution but no longer has any nausea.  He denies any urinary complaints.  Denies chest pain or shortness of breath or palpitations.      WBC 11, K elevated mildly but moderately hemolyzed sample, not likely truly elevated. CT abdomen pelvis revealed no acute findings with exception of \"probable mild proctitis\". Patient was asked about rectal symptoms and denied any pain on passing stools or other associated symptoms. "  Patient was given droperidol which significantly improved both the pain and the nausea.  Towards the end of the encounter, patient only had mild pain.  Given the labs and CT findings, and robust response to droperidol, it is most likely that the patient was experiencing one of their gastroparesis flares.  Patient was counseled on this and told to follow-up with her GI doctor.  Patient was counseled extensively and discharged with explicit return precautions.    Amount and/or Complexity of Data Reviewed  Labs: ordered. Decision-making details documented in ED Course.  Radiology: ordered.    Risk  Prescription drug management.        ED Course as of 10/28/24 2050   Sun Oct 27, 2024   1208 WBC(!): 11.20       Medications   droperidol (INAPSINE) injection 0.625 mg (0.625 mg Intravenous Given 10/27/24 1233)   iohexol (OMNIPAQUE) 350 MG/ML injection (MULTI-DOSE) 70 mL (70 mL Intravenous Given 10/27/24 1409)       ED Risk Strat Scores                                               History of Present Illness       Chief Complaint   Patient presents with    Abdominal Pain     Per ems, pt comes from home c/o severe abd pain and nausea. Hx gastroparesis       Past Medical History:   Diagnosis Date    Acid reflux disease     Anemia     Anxiety     Cirrhosis (HCC)     Depression     Fibromyalgia     Fibromyalgia     Gastritis     GERD (gastroesophageal reflux disease)     High blood pressure     Hyperlipidemia     Hypertension     Rheumatoid arthritis (HCC)     Urinary tract infection       Past Surgical History:   Procedure Laterality Date    BREAST BIOPSY Right     2011    CHOLECYSTECTOMY      COLONOSCOPY      Fiberoptic    HYSTERECTOMY      UPPER GASTROINTESTINAL ENDOSCOPY      therapeutic      Family History   Problem Relation Age of Onset    Heart attack Mother     Heart disease Mother     Hypertension Mother         High Blood Prssure    Hyperlipidemia Mother         High Cholesterol    Stroke Mother     Breast cancer  Sister     Depression Sister     Other Sister         Thyroid Cyst    Diabetes Brother     Ovarian cancer Sister     Stroke Grandchild     Cholelithiasis Family     Depression Family     Gallbladder disease Family       Social History     Tobacco Use    Smoking status: Never    Smokeless tobacco: Never    Tobacco comments:     Denied Tobacco Use   Vaping Use    Vaping status: Never Used   Substance Use Topics    Alcohol use: No    Drug use: No      E-Cigarette/Vaping    E-Cigarette Use Never User       E-Cigarette/Vaping Substances    Nicotine No     THC No     CBD No     Flavoring No     Other No     Unknown No       I have reviewed and agree with the history as documented.     Patient is a 68-year-old female with history of gastroparesis who presented to the ED for lower abdominal pain.  Patient stated that she initially started having worsening of her typical abdominal pain at approximately 9 AM this morning.  States that the pain was a 10/10 and was excruciating, causing her not to be able to move at all due to it.  Patient stated this was also accompanied with nausea as well as 2 instances of nonbloody emesis and a few instances of diarrhea.  She stated that she has not had any p.o. intake this morning.  No appetite.  Denies any unusual recent oral intakes.  She also denies any recent fevers, chills, or other systemic symptoms.  Patient states that she was in her usual state of health when she woke up this morning as well as yesterday.  States that she typically has gastroparesis pain that is mild but constant, but the pain today feels different and much more severe.  Currently the patient endorses 7/10 abdominal pain in the same distribution but no longer has any nausea.  He denies any urinary complaints.  Denies chest pain or shortness of breath or palpitations.        Review of Systems   Constitutional:  Positive for fatigue. Negative for chills and fever.   HENT: Negative.     Respiratory:  Negative for  cough and shortness of breath.    Cardiovascular:  Negative for chest pain and palpitations.   Gastrointestinal:  Positive for abdominal pain, nausea and vomiting.   Genitourinary:  Negative for dysuria, frequency and urgency.   Skin: Negative.    Neurological:  Negative for dizziness, syncope and headaches.   Psychiatric/Behavioral: Negative.             Objective       ED Triage Vitals   Temperature Pulse Blood Pressure Respirations SpO2 Patient Position - Orthostatic VS   10/27/24 1109 10/27/24 1109 10/27/24 1109 10/27/24 1109 10/27/24 1109 10/27/24 1109   97.7 °F (36.5 °C) 76 131/63 18 97 % Sitting      Temp Source Heart Rate Source BP Location FiO2 (%) Pain Score    10/27/24 1109 10/27/24 1109 10/27/24 1109 -- 10/27/24 1112    Oral Monitor Left arm  9      Vitals      Date and Time Temp Pulse SpO2 Resp BP Pain Score FACES Pain Rating User   10/27/24 1400 -- 91 99 % 18 147/79 -- -- AD   10/27/24 1300 -- 80 95 % 18 142/76 -- -- AD   10/27/24 1112 -- -- -- -- -- 9 -- AD   10/27/24 1109 97.7 °F (36.5 °C) 76 97 % 18 131/63 -- -- AD            Physical Exam  On examination:  The patient is awake, alert and oriented  HEENT: Normocephalic/atraumatic  External examination of the ears is unremarkable  Pupils are equal round and reactive to light, there is no conjunctival injection or scleral icterus noted  Nares are patent without rhinorrhea.  The oropharynx is moist without injection  The neck is supple  Lungs: Clear to auscultation bilaterally  Heart: Regular without murmurs rubs or gallops  Abdomen: Moderate tenderness to palpation of lower abdomen diffusely. No guarding/rigidity.  Musculoskeletal: Normal range of motion with grossly normal strength  Neuro: Cranial nerves II through XII grossly intact. Nonfocal exam  Skin: No rash noted  Psych: Mood and affect normal      Results Reviewed       Procedure Component Value Units Date/Time    Urine Microscopic [[id number]]  (Abnormal) Collected: 10/27/24 1419    Lab  Status: Final result Specimen: Urine, Clean Catch Updated: 10/27/24 1436     RBC, UA 1-2 /hpf      WBC, UA 1-2 /hpf      Epithelial Cells Occasional /hpf      Bacteria, UA None Seen /hpf      MUCUS THREADS Occasional    UA (URINE) with reflex to Scope [[id number]]  (Abnormal) Collected: 10/27/24 1419    Lab Status: Final result Specimen: Urine, Clean Catch Updated: 10/27/24 1434     Color, UA Yellow     Clarity, UA Clear     Specific Gravity, UA 1.021     pH, UA 6.5     Leukocytes, UA Negative     Nitrite, UA Negative     Protein, UA Trace mg/dl      Glucose, UA Negative mg/dl      Ketones, UA Negative mg/dl      Urobilinogen, UA <2.0 mg/dl      Bilirubin, UA Negative     Occult Blood, UA Negative    Basic metabolic panel [[id number]]  (Abnormal) Collected: 10/27/24 1330    Lab Status: Final result Specimen: Blood from Arm, Left Updated: 10/27/24 1358     Sodium 139 mmol/L      Potassium 4.0 mmol/L      Chloride 109 mmol/L      CO2 25 mmol/L      ANION GAP 5 mmol/L      BUN 16 mg/dL      Creatinine 0.76 mg/dL      Glucose 105 mg/dL      Calcium 9.2 mg/dL      eGFR 81 ml/min/1.73sq m     Narrative:      National Kid[employer] Disease Foundation guidelines for Chronic Kidney Disease (CKD):     Stage 1 with normal or high GFR (GFR > 90 mL/min/1.73 square meters)    Stage 2 Mild CKD (GFR = 60-89 mL/min/1.73 square meters)    Stage 3A Moderate CKD (GFR = 45-59 mL/min/1.73 square meters)    Stage 3B Moderate CKD (GFR = 30-44 mL/min/1.73 square meters)    Stage 4 Severe CKD (GFR = 15-29 mL/min/1.73 square meters)    Stage 5 End Stage CKD (GFR <15 mL/min/1.73 square meters)  Note: GFR calculation is accurate only with a steady state creatinine    Comprehensive metabolic panel [[id number]]  (Abnormal) Collected: 10/27/24 1116    Lab Status: Final result Specimen: Blood from Arm, Left Updated: 10/27/24 1139     Sodium 138 mmol/L      Potassium 5.6 mmol/L      Chloride 106 mmol/L      CO2 25 mmol/L      ANION GAP 7 mmol/L      BUN 15  mg/dL      Creatinine 0.83 mg/dL      Glucose 151 mg/dL      Calcium 8.8 mg/dL      AST 29 U/L      ALT 16 U/L      Alkaline Phosphatase 102 U/L      Total Protein 8.4 g/dL      Albumin 4.3 g/dL      Total Bilirubin 0.61 mg/dL      eGFR 73 ml/min/1.73sq m     Narrative:      National Kidney Disease Foundation guidelines for Chronic Kidney Disease (CKD):     Stage 1 with normal or high GFR (GFR > 90 mL/min/1.73 square meters)    Stage 2 Mild CKD (GFR = 60-89 mL/min/1.73 square meters)    Stage 3A Moderate CKD (GFR = 45-59 mL/min/1.73 square meters)    Stage 3B Moderate CKD (GFR = 30-44 mL/min/1.73 square meters)    Stage 4 Severe CKD (GFR = 15-29 mL/min/1.73 square meters)    Stage 5 End Stage CKD (GFR <15 mL/min/1.73 square meters)  Note: GFR calculation is accurate only with a steady state creatinine    Lipase [[id number]]  (Normal) Collected: 10/27/24 1116    Lab Status: Final result Specimen: Blood from Arm, Left Updated: 10/27/24 1139     Lipase 27 u/L     CBC and differential [[id number]]  (Abnormal) Collected: 10/27/24 1116    Lab Status: Final result Specimen: Blood from Arm, Left Updated: 10/27/24 1125     WBC 11.20 Thousand/uL      RBC 4.31 Million/uL      Hemoglobin 13.1 g/dL      Hematocrit 40.9 %      MCV 95 fL      MCH 30.4 pg      MCHC 32.0 g/dL      RDW 13.2 %      MPV 9.8 fL      Platelets 355 Thousands/uL      nRBC 0 /100 WBCs      Segmented % 66 %      Immature Grans % 0 %      Lymphocytes % 27 %      Monocytes % 6 %      Eosinophils Relative 1 %      Basophils Relative 0 %      Absolute Neutrophils 7.31 Thousands/µL      Absolute Immature Grans 0.02 Thousand/uL      Absolute Lymphocytes 3.06 Thousands/µL      Absolute Monocytes 0.67 Thousand/µL      Eosinophils Absolute 0.09 Thousand/µL      Basophils Absolute 0.05 Thousands/µL             CT abdomen pelvis with contrast   Final Interpretation by Kiran Paul MD (10/27 1446)      Probable mild lower proctitis.      Scattered colonic  diverticulosis without evidence of diverticulitis.      Appendix is normal.               Workstation performed: [id number]             Procedures    ED Medication and Procedure Management   Prior to Admission Medications   Prescriptions Last Dose Informant Patient Reported? Taking?   Cholecalciferol (RA Vitamin D-3) 25 MCG (1000 UT) tablet  Child No No   Sig: Take 1 tablet (1,000 Units total) by mouth daily   Collagen Hydrolysate, Bovine, POWD   Yes No   Sig: Use   calcium carbonate (OS-MOLLY) 600 MG tablet  Child No No   Sig: Take 1 tablet (600 mg total) by mouth daily   Patient not taking: Reported on 5/6/2024   dicyclomine (BENTYL) 10 mg capsule   No No   Sig: take 1 capsule by mouth four times a day before meals and at bedtime   esomeprazole (NexIUM) 40 MG capsule   No No   Sig: take 1 capsule by mouth once daily   famotidine (PEPCID) 20 mg tablet   No No   Sig: Take 1 tablet (20 mg total) by mouth daily at bedtime   Patient not taking: Reported on 9/16/2024   hydrocortisone (ANUSOL-HC) 2.5 % rectal cream  Child No No   Sig: Apply topically 2 (two) times a day   Patient not taking: Reported on 9/16/2024   hydrocortisone 1 % cream   No No   Sig: Apply topically 3 (three) times a day   lidocaine (XYLOCAINE) 5 % ointment  Child No No   Sig: Apply topically as needed for mild pain   Patient not taking: Reported on 10/14/2024   rosuvastatin (CRESTOR) 10 MG tablet   No No   Sig: Take 1 tablet (10 mg total) by mouth daily   ursodiol (ACTIGALL) 300 mg capsule   No No   Sig: take 1 capsule by mouth twice a day      Facility-Administered Medications: None     Discharge Medication List as of 10/27/2024  3:32 PM        CONTINUE these medications which have NOT CHANGED    Details   calcium carbonate (OS-MOLLY) 600 MG tablet Take 1 tablet (600 mg total) by mouth daily, Starting Wed 5/19/2021, Normal      Cholecalciferol (RA Vitamin D-3) 25 MCG (1000 UT) tablet Take 1 tablet (1,000 Units total) by mouth daily, Starting Wed  5/19/2021, Normal      Collagen Hydrolysate, Bovine, POWD Use, Historical Med      dicyclomine (BENTYL) 10 mg capsule take 1 capsule by mouth four times a day before meals and at bedtime, Normal      esomeprazole (NexIUM) 40 MG capsule take 1 capsule by mouth once daily, Starting Sun 5/19/2024, Normal      famotidine (PEPCID) 20 mg tablet Take 1 tablet (20 mg total) by mouth daily at bedtime, Starting Mon 5/6/2024, Normal      hydrocortisone (ANUSOL-HC) 2.5 % rectal cream Apply topically 2 (two) times a day, Starting Wed 1/26/2022, Normal      hydrocortisone 1 % cream Apply topically 3 (three) times a day, Starting Thu 6/20/2024, Normal      lidocaine (XYLOCAINE) 5 % ointment Apply topically as needed for mild pain, Starting Mon 5/2/2022, Normal      rosuvastatin (CRESTOR) 10 MG tablet Take 1 tablet (10 mg total) by mouth daily, Starting Mon 7/15/2024, Normal      ursodiol (ACTIGALL) 300 mg capsule take 1 capsule by mouth twice a day, Starting Thu 9/12/2024, Normal           No discharge procedures on file.  ED SEPSIS DOCUMENTATION   Time reflects when diagnosis was documented in both MDM as applicable and the Disposition within this note       Time User Action Codes Description Comment    10/27/2024  3:15 PM Manuel Thomas [R10.30] Lower abdominal pain     10/27/2024  3:15 PM Manuel Thomas [R11.2] Nausea & vomiting                  Manuel Thomas MD  10/28/24 2050

## 2024-11-04 ENCOUNTER — OFFICE VISIT (OUTPATIENT)
Dept: FAMILY MEDICINE CLINIC | Facility: CLINIC | Age: 69
End: 2024-11-04
Payer: COMMERCIAL

## 2024-11-04 VITALS
WEIGHT: 140.8 LBS | TEMPERATURE: 97.7 F | HEIGHT: 63 IN | OXYGEN SATURATION: 98 % | RESPIRATION RATE: 16 BRPM | DIASTOLIC BLOOD PRESSURE: 85 MMHG | SYSTOLIC BLOOD PRESSURE: 149 MMHG | BODY MASS INDEX: 24.95 KG/M2 | HEART RATE: 79 BPM

## 2024-11-04 DIAGNOSIS — K31.84 GASTROPARESIS: ICD-10-CM

## 2024-11-04 DIAGNOSIS — I10 PRIMARY HYPERTENSION: Primary | ICD-10-CM

## 2024-11-04 DIAGNOSIS — R73.03 PREDIABETES: ICD-10-CM

## 2024-11-04 DIAGNOSIS — F41.9 ANXIETY: ICD-10-CM

## 2024-11-04 DIAGNOSIS — E78.2 MIXED HYPERLIPIDEMIA: ICD-10-CM

## 2024-11-04 PROCEDURE — 99214 OFFICE O/P EST MOD 30 MIN: CPT | Performed by: FAMILY MEDICINE

## 2024-11-04 PROCEDURE — G2211 COMPLEX E/M VISIT ADD ON: HCPCS | Performed by: FAMILY MEDICINE

## 2024-11-04 RX ORDER — SERTRALINE HYDROCHLORIDE 25 MG/1
25 TABLET, FILM COATED ORAL DAILY
Qty: 30 TABLET | Refills: 5 | Status: SHIPPED | OUTPATIENT
Start: 2024-11-04 | End: 2025-05-03

## 2024-11-04 NOTE — ASSESSMENT & PLAN NOTE
Discussed with patient and her daughter.  Patient briefly counseled.  Patient will restart medication.  And patient started on Zoloft 25 mg once daily.  Advised patient if she has any side effects from it to give us a call.  And if she does not see any improvement or change in her symptoms in 4 to 6 weeks even 8 weeks then to call and or call to consider increasing the dose.  Patient denies SI HI.  Continue to monitor.  Orders:    TSH, 3rd generation with Free T4 reflex; Future    sertraline (ZOLOFT) 25 mg tablet; Take 1 tablet (25 mg total) by mouth daily

## 2024-11-04 NOTE — ASSESSMENT & PLAN NOTE
Improved.  Glucose was 89 and her A1c went down to 5.6 now from 5.8 in July of this year.  Again patient advised to continue cutting down on her starches sweets and fats.  Diet and exercise.  Recheck labs in 6 months.  Orders:    Comprehensive metabolic panel; Future    TSH, 3rd generation with Free T4 reflex; Future    Hemoglobin A1C; Future    Albumin / creatinine urine ratio; Future

## 2024-11-04 NOTE — ASSESSMENT & PLAN NOTE
Discussed with patient.  General education.  Recent ER visit including her labs and CT discussed with patient as well.  And per her daughter patient will be seeing her gastro soon.  Also advised less anxiety control if and when possible.  Patient education.

## 2024-11-04 NOTE — ASSESSMENT & PLAN NOTE
Study elevated secondary to the recent stress that she is going through.  Plus patient gets anxious at the doctor's office.  Patient denies any acute cardiovascular neurosymptoms from it.  Patient advised low-salt diet.  Hydrate well.  Continue current therapy.  Labs reviewed and will follow-up on labs also in 6 months.  Orders:    CBC and differential; Future    Comprehensive metabolic panel; Future    UA w Reflex to Microscopic w Reflex to Culture; Future

## 2024-11-04 NOTE — PROGRESS NOTES
Ambulatory Visit  Name: Fleicitas Stevens      : 1955      MRN: [id number]  Encounter Provider: Norberto Musa MD  Encounter Date: 2024   Encounter department: [facility]    Assessment & Plan  Primary hypertension  Study elevated secondary to the recent stress that she is going through.  Plus patient gets anxious at the doctor's office.  Patient denies any acute cardiovascular neurosymptoms from it.  Patient advised low-salt diet.  Hydrate well.  Continue current therapy.  Labs reviewed and will follow-up on labs also in 6 months.  Orders:    CBC and differential; Future    Comprehensive metabolic panel; Future    UA w Reflex to Microscopic w Reflex to Culture; Future    Mixed hyperlipidemia  Improved.  LFTs normal.  Total cholesterol 1 down to 167 from 237 in July of this year, triglycerides went up to 134 from 133, HDL went down to 37 from 39, LDL went down to 103 from 171.  Patient advised to continue cutting down on her fats as well as starches.  Diet and exercise.  Continue current therapy.  Recheck labs in 6 months.  Orders:    Comprehensive metabolic panel; Future    Lipid panel; Future    Prediabetes  Improved.  Glucose was 89 and her A1c went down to 5.6 now from 5.8 in July of this year.  Again patient advised to continue cutting down on her starches sweets and fats.  Diet and exercise.  Recheck labs in 6 months.  Orders:    Comprehensive metabolic panel; Future    TSH, 3rd generation with Free T4 reflex; Future    Hemoglobin A1C; Future    Albumin / creatinine urine ratio; Future    Gastroparesis  Discussed with patient.  General education.  Recent ER visit including her labs and CT discussed with patient as well.  And per her daughter patient will be seeing her gastro soon.  Also advised less anxiety control if and when possible.  Patient education.       Anxiety  Discussed with patient and her daughter.  Patient briefly counseled.  Patient will restart  medication.  And patient started on Zoloft 25 mg once daily.  Advised patient if she has any side effects from it to give us a call.  And if she does not see any improvement or change in her symptoms in 4 to 6 weeks even 8 weeks then to call and or call to consider increasing the dose.  Patient denies SI HI.  Continue to monitor.  Orders:    TSH, 3rd generation with Free T4 reflex; Future    sertraline (ZOLOFT) 25 mg tablet; Take 1 tablet (25 mg total) by mouth daily       RTO 6 months and do the blood work and urine before.  Patient can see me prior to that for anything else in between.              History of Present Illness     68-year-old female here accompanied by her daughter for an evaluation of her prediabetes, lipids and hypertension.  Patient did blood work and urine in October 22.  Patient also had labs done for her rheumatologist at that time.  Of note patient was seen in emergency room a few days ago for lower abdominal/pelvic pain.  Patient had blood work and urine as well as CT of the abdomen pelvis done.  And patient was discharged with basically supportive care.  And patient will be seeing her gastro soon for follow-up on the symptoms.  Patient has a history of gastroparesis and daughter asserts that her acute episode this time that she needed to go to the ER for was likely provoked by the stress that she had with her son the day before.  Patient also has a history of anxiety and would like to restart her anxiety medication as well.  Patient denies SI HI.  Patient also received her flu vaccine already.  Also, patient is scheduled for her mammogram on 11/22/2024 already.          Review of Systems   Constitutional:  Negative for chills, fatigue, fever and unexpected weight change.   HENT:  Negative for congestion and sore throat.    Eyes:  Negative for visual disturbance.   Respiratory:  Negative for cough and shortness of breath.    Cardiovascular:  Negative for chest pain and palpitations.  "  Gastrointestinal:  Positive for abdominal pain and constipation. Negative for blood in stool, diarrhea, nausea and vomiting.   Genitourinary:  Negative for dysuria and hematuria.   Musculoskeletal:  Positive for arthralgias.   Neurological:  Negative for dizziness and headaches.   Psychiatric/Behavioral:  Negative for dysphoric mood, self-injury and suicidal ideas. The patient is nervous/anxious.            Objective     /85 (BP Location: Left arm, Patient Position: Sitting, Cuff Size: Adult)   Pulse 79   Temp 97.7 °F (36.5 °C) (Temporal)   Resp 16   Ht 5' 3\" (1.6 m)   Wt 63.9 kg (140 lb 12.8 oz)   LMP  (LMP Unknown)   SpO2 98%   BMI 24.94 kg/m²     Physical Exam  Vitals reviewed.   Constitutional:       General: She is not in acute distress.     Appearance: Normal appearance. She is normal weight. She is not ill-appearing.   HENT:      Head: Normocephalic and atraumatic.      Mouth/Throat:      Mouth: Mucous membranes are moist.   Neck:      Vascular: No carotid bruit.   Cardiovascular:      Rate and Rhythm: Normal rate and regular rhythm.   Pulmonary:      Effort: Pulmonary effort is normal.      Breath sounds: Normal breath sounds.   Abdominal:      General: Bowel sounds are normal.      Palpations: Abdomen is soft.      Tenderness: There is abdominal tenderness. There is no right CVA tenderness, left CVA tenderness or guarding.      Comments: Mild generalized tenderness.  Not new per patient.  And not acute either.   Musculoskeletal:         General: No tenderness.      Right lower leg: No edema.      Left lower leg: No edema.      Comments: No calf tenderness bilateral.   Skin:     General: Skin is warm.   Neurological:      General: No focal deficit present.      Mental Status: She is alert and oriented to person, place, and time.   Psychiatric:         Mood and Affect: Mood normal.         Behavior: Behavior normal.         Thought Content: Thought content normal.         "

## 2024-11-04 NOTE — ASSESSMENT & PLAN NOTE
Improved.  LFTs normal.  Total cholesterol 1 down to 167 from 237 in July of this year, triglycerides went up to 134 from 133, HDL went down to 37 from 39, LDL went down to 103 from 171.  Patient advised to continue cutting down on her fats as well as starches.  Diet and exercise.  Continue current therapy.  Recheck labs in 6 months.  Orders:    Comprehensive metabolic panel; Future    Lipid panel; Future

## 2024-11-07 ENCOUNTER — TELEPHONE (OUTPATIENT)
Age: 69
End: 2024-11-07

## 2024-11-07 NOTE — TELEPHONE ENCOUNTER
Patient's Daughter called asking for MRI script to be scanned and sent via [employer]. Patient will need to print out the script as she will not be going to [facility] because she needs to go to an open MRI facility.

## 2024-11-11 ENCOUNTER — OFFICE VISIT (OUTPATIENT)
Dept: GASTROENTEROLOGY | Facility: CLINIC | Age: 69
End: 2024-11-11
Payer: COMMERCIAL

## 2024-11-11 VITALS
HEIGHT: 63 IN | OXYGEN SATURATION: 97 % | BODY MASS INDEX: 24.98 KG/M2 | WEIGHT: 141 LBS | HEART RATE: 66 BPM | SYSTOLIC BLOOD PRESSURE: 120 MMHG | DIASTOLIC BLOOD PRESSURE: 70 MMHG | TEMPERATURE: 97.7 F

## 2024-11-11 DIAGNOSIS — K31.84 GASTROPARESIS: ICD-10-CM

## 2024-11-11 DIAGNOSIS — R13.19 ESOPHAGEAL DYSPHAGIA: Primary | ICD-10-CM

## 2024-11-11 DIAGNOSIS — K21.9 GASTROESOPHAGEAL REFLUX DISEASE WITHOUT ESOPHAGITIS: ICD-10-CM

## 2024-11-11 DIAGNOSIS — K57.90 DIVERTICULOSIS: ICD-10-CM

## 2024-11-11 DIAGNOSIS — K31.A0 INTESTINAL METAPLASIA OF GASTRIC MUCOSA: ICD-10-CM

## 2024-11-11 DIAGNOSIS — K76.0 FATTY LIVER DISEASE, NONALCOHOLIC: ICD-10-CM

## 2024-11-11 DIAGNOSIS — K76.89 HEPATIC CYST: ICD-10-CM

## 2024-11-11 DIAGNOSIS — K74.3 PRIMARY BILIARY CHOLANGITIS (HCC): ICD-10-CM

## 2024-11-11 PROCEDURE — G2211 COMPLEX E/M VISIT ADD ON: HCPCS | Performed by: INTERNAL MEDICINE

## 2024-11-11 PROCEDURE — 99214 OFFICE O/P EST MOD 30 MIN: CPT | Performed by: INTERNAL MEDICINE

## 2024-11-11 NOTE — ASSESSMENT & PLAN NOTE
She is on ursodiol 300 mg twice daily. She will continue this at this time. Ultrasound elastography in fall 2023 revealed S1 steatosis F0-F1 liver fibrosis which is reassuring.   Her LFTs are within normal limits.  Normal alkaline phosphatase.  No clinical evidence of cirrhosis.  Will do repeat imaging and elastography in the next 6 to 12 months.      Osteoporosis - prolia was reccommended         Orders:    Ambulatory Referral to Hepatology    CBC; Future    Comprehensive metabolic panel; Future

## 2024-11-11 NOTE — ASSESSMENT & PLAN NOTE
I reviewed diet and lifestyle precautions. This includes limiting coffee, soda, tomatoes, citrus, fatty and spicy foods.  I recommend waiting 3 hours after dinner to lie down. I recommend eating small frequent meals as well as sleeping with head of bed elevated at night.  Continue esomeprazole 40 mg daily

## 2024-11-11 NOTE — ASSESSMENT & PLAN NOTE
Scattered colonic diverticulosis without evidence of diverticulitis.   Appendix is normal.

## 2024-11-11 NOTE — ASSESSMENT & PLAN NOTE
Stable 5.1 cm right hepatic anechoic cyst with thin septations.   Stable 1.2 cm right hepatic simple cyst

## 2024-11-11 NOTE — ASSESSMENT & PLAN NOTE
Recommend small and frequent meal  Low fiber low fiber diet  Discussed extensively regarding treatment options and patient is not interested in starting domperidone or surgical/endoscopic intervention

## 2024-11-11 NOTE — PROGRESS NOTES
Ambulatory Visit  Name: Felicitas Stevens      : 1955      MRN: [id number]  Encounter Provider: Gerard Osorio MD  Encounter Date: 2024   Encounter department: [facility] [place]    Assessment & Plan  Gastroparesis  Recommend small and frequent meal  Low fiber low fiber diet  Discussed extensively regarding treatment options and patient is not interested in starting domperidone or surgical/endoscopic intervention         Esophageal dysphagia  This is secondary to Sjogren's syndrome -dry eye and mouth   Overall stable       Gastroesophageal reflux disease without esophagitis   I reviewed diet and lifestyle precautions. This includes limiting coffee, soda, tomatoes, citrus, fatty and spicy foods.  I recommend waiting 3 hours after dinner to lie down. I recommend eating small frequent meals as well as sleeping with head of bed elevated at night.  Continue esomeprazole 40 mg daily       Fatty liver disease, nonalcoholic  Encouraged her on ongoing lifestyle change.  Weight loss, regular exercise and dietary modification       Intestinal metaplasia of gastric mucosa  She had no intestinal metaplasia on the last 2 EGDs.      Surveillance EGD can be considered in        Hepatic cyst  Stable 5.1 cm right hepatic anechoic cyst with thin septations.   Stable 1.2 cm right hepatic simple cyst        Diverticulosis     Scattered colonic diverticulosis without evidence of diverticulitis.   Appendix is normal.       Primary biliary cholangitis (HCC)  She is on ursodiol 300 mg twice daily. She will continue this at this time. Ultrasound elastography in 2023 revealed S1 steatosis F0-F1 liver fibrosis which is reassuring.   Her LFTs are within normal limits.  Normal alkaline phosphatase.  No clinical evidence of cirrhosis.  Will do repeat imaging and elastography in the next 6 to 12 months.      Osteoporosis - prolia was reccommended         Orders:    Ambulatory Referral  to Hepatology    CBC; Future    Comprehensive metabolic panel; Future      History of Present Illness     Felicitas Stevens is a 68 y.o. female who presents for follow-up visit.  She has gastroparesis diagnosed in 2017.  This is being managed with dietary modification.  Previously discussed G POEM and domperidone but patient was not interested due to concern for side effects.    She reports intermittent abdominal pain and bloating.  Denies nausea, vomiting or weight loss.  She still eats significant amount of fruits and vegetables  She has normal bowel movement and reports intermittent diarrhea    Recent ED visit reviewed for abdominal pain.  She had a CT scan which showed mild proctitis and diverticulosis otherwise unremarkable  Last colonoscopy 2020 she is due for surveillance colonoscopy in 2025    Prior history of gastric intestinal metaplasia.  Last EGD was in April 2023.  On this biopsy was negative for intestinal metaplasia surveillance EGD in 2028        Review of Systems   Constitutional:  Negative for chills and fever.   HENT:  Negative for ear pain and sore throat.    Eyes:  Negative for pain and visual disturbance.   Respiratory:  Negative for cough and shortness of breath.    Cardiovascular:  Negative for chest pain and palpitations.   Gastrointestinal:  Positive for abdominal pain. Negative for constipation, diarrhea, nausea and vomiting.   Genitourinary:  Negative for dysuria, frequency and hematuria.   Musculoskeletal:  Positive for arthralgias and myalgias. Negative for back pain.   Skin:  Negative for color change and rash.   Neurological:  Negative for seizures, syncope and headaches.   All other systems reviewed and are negative.          Objective     LMP  (LMP Unknown)     Physical Exam  Constitutional:       Appearance: She is well-developed.   HENT:      Head: Normocephalic and atraumatic.   Eyes:      Pupils: Pupils are equal, round, and reactive to light.   Cardiovascular:      Rate and  Rhythm: Normal rate.      Heart sounds: Normal heart sounds.   Pulmonary:      Effort: No respiratory distress.      Breath sounds: No wheezing or rales.   Abdominal:      General: Bowel sounds are normal. There is no distension.      Palpations: Abdomen is soft. There is no mass.      Tenderness: There is no abdominal tenderness. There is no rebound.   Musculoskeletal:         General: Normal range of motion.      Cervical back: Normal range of motion and neck supple.   Skin:     General: Skin is warm.   Neurological:      Mental Status: She is alert and oriented to person, place, and time.   Psychiatric:         Behavior: Behavior normal.         Answers submitted by the patient for this visit:  Abdominal Pain Questionnaire (Submitted on 11/7/2024)  Chief Complaint: Abdominal pain  Chronicity: chronic  Onset: more than 1 year ago  Onset quality: gradual  Frequency: daily  Episode duration: 12 Months  Progression since onset: waxing and waning  Pain location: generalized abdominal region  Pain - numeric: 7/10  Pain quality: sharp  Radiates to: back, right flank  anorexia: Yes  belching: No  flatus: No  hematochezia: No  melena: No  weight loss: No  Aggravated by: eating  Relieved by: nothing

## 2024-11-11 NOTE — ASSESSMENT & PLAN NOTE
Encouraged her on ongoing lifestyle change.  Weight loss, regular exercise and dietary modification

## 2024-11-11 NOTE — ASSESSMENT & PLAN NOTE
She had no intestinal metaplasia on the last 2 EGDs.      Surveillance EGD can be considered in 2028

## 2024-11-25 ENCOUNTER — TELEPHONE (OUTPATIENT)
Age: 69
End: 2024-11-25

## 2024-11-25 DIAGNOSIS — F41.9 ANXIETY: ICD-10-CM

## 2024-11-25 NOTE — TELEPHONE ENCOUNTER
[facility]     Prior Auth Needed - MRI Lumbar Spine    Scheduled for 12/02/2024

## 2024-11-27 RX ORDER — SERTRALINE HYDROCHLORIDE 25 MG/1
25 TABLET, FILM COATED ORAL DAILY
Qty: 90 TABLET | Refills: 1 | Status: SHIPPED | OUTPATIENT
Start: 2024-11-27

## 2024-12-03 ENCOUNTER — RESULTS FOLLOW-UP (OUTPATIENT)
Age: 69
End: 2024-12-03

## 2024-12-03 DIAGNOSIS — M47.816 LUMBAR SPONDYLOSIS: Primary | ICD-10-CM

## 2024-12-03 NOTE — TELEPHONE ENCOUNTER
Pt's daughter calls back requesting for provider or a nurse to call her back to go over results in detail.

## 2025-03-12 DIAGNOSIS — K74.3 PRIMARY BILIARY CHOLANGITIS (HCC): ICD-10-CM

## 2025-03-12 RX ORDER — URSODIOL 300 MG/1
300 CAPSULE ORAL 2 TIMES DAILY
Qty: 180 CAPSULE | Refills: 1 | Status: SHIPPED | OUTPATIENT
Start: 2025-03-12

## 2025-04-02 ENCOUNTER — OFFICE VISIT (OUTPATIENT)
Age: 70
End: 2025-04-02
Payer: COMMERCIAL

## 2025-04-02 VITALS
OXYGEN SATURATION: 97 % | DIASTOLIC BLOOD PRESSURE: 72 MMHG | HEART RATE: 72 BPM | WEIGHT: 141.2 LBS | HEIGHT: 63 IN | TEMPERATURE: 97.6 F | SYSTOLIC BLOOD PRESSURE: 122 MMHG | BODY MASS INDEX: 25.02 KG/M2

## 2025-04-02 DIAGNOSIS — M47.816 LUMBAR SPONDYLOSIS: ICD-10-CM

## 2025-04-02 DIAGNOSIS — M35.01 SJOGREN'S SYNDROME WITH KERATOCONJUNCTIVITIS SICCA (HCC): Primary | ICD-10-CM

## 2025-04-02 DIAGNOSIS — M81.0 AGE-RELATED OSTEOPOROSIS WITHOUT CURRENT PATHOLOGICAL FRACTURE: ICD-10-CM

## 2025-04-02 PROCEDURE — 99214 OFFICE O/P EST MOD 30 MIN: CPT | Performed by: PHYSICIAN ASSISTANT

## 2025-04-02 NOTE — ASSESSMENT & PLAN NOTE
She does have ongoing lower back pain with history of degenerative disc disease.  She has done physical therapy in the past.  Encouraged regular home exercise program.  If symptoms worsen could also consider evaluation with pain management.

## 2025-04-02 NOTE — ASSESSMENT & PLAN NOTE
Her Sjogren's symptoms are stable.  Continue symptomatic treatment for dry eye and dry mouth symptoms.  Labs as ordered to monitor for disease activity.  Plan to follow-up in 6 months or sooner if needed.    Orders:    CBC and differential; Future    Comprehensive metabolic panel; Future    C-reactive protein; Future    Sedimentation rate, automated; Future    dsDNA Antibody by IFA, Johanny weathers, with Reflex to Titer; Future    C3 complement; Future    C4 complement; Future

## 2025-04-02 NOTE — PROGRESS NOTES
Name: Felicitas Stevens      : 1955      MRN: [id number]  Encounter Provider: Alyse Law PA-C  Encounter Date: 2025   Encounter department: [facility]  :  Assessment & Plan  Sjogren's syndrome with keratoconjunctivitis sicca (HCC)  Her Sjogren's symptoms are stable.  Continue symptomatic treatment for dry eye and dry mouth symptoms.  Labs as ordered to monitor for disease activity.  Plan to follow-up in 6 months or sooner if needed.    Orders:    CBC and differential; Future    Comprehensive metabolic panel; Future    C-reactive protein; Future    Sedimentation rate, automated; Future    dsDNA Antibody by IFA, Johanny weathers, with Reflex to Titer; Future    C3 complement; Future    C4 complement; Future    Lumbar spondylosis  She does have ongoing lower back pain with history of degenerative disc disease.  She has done physical therapy in the past.  Encouraged regular home exercise program.  If symptoms worsen could also consider evaluation with pain management.         Age-related osteoporosis without current pathological fracture  History of osteoporosis.  She is currently receiving Prolia from her PCP.           History of Present Illness   Felicitas Stevens is a 69 y.o. female.  She is here for follow-up of Sjogren's.  She is a previous patient of Dr. Tipton at [facility] rheumatology.  She is currently using drops as needed for her dry eye symptoms.  For her dry mouth symptoms she tries to drink water to help give her symptomatic relief.  Overall her symptoms have been stable.    She has a history of fibromyalgia.  She has never been on any medication for her fibromyalgia.  She is most bothered by some lower back pain and muscle tightness.  She does have a history of lumbar spondylosis.  She tries to stay active and walks regularly.  She uses Tylenol as needed for pain.  She had x-rays of her lumbar spine done on 10/22/2024.  This revealed  multilevel degenerative facet hypertrophy.  She was also noted to have age-indeterminate slight anterior wedging at L1, L2, and L3.  She ultimately had an MRI done on 12/2/2024 of her lumbar spine.  This revealed degenerative changes with central and neuroforaminal encroachment.     She has a history of biliary cirrhosis and follows with GI.  She has a history of gastroparesis as well. She has a history of hyperlipidemia, hypertension, and prediabetes.    She has a history of osteoporosis.  Her most recent DEXA scan was done on 10/31/2023.  Lumbar spine T-score (L1, L3, L4) -2.0.  Left total hip T-score -1.9.  This is decreased 4.6% as compared to her previous scan.  Left femoral neck T-score -2.5.  She is receiving Prolia through her PCPs office.    She had labs done on 10/22/2024.  CBC, CMP essentially unremarkable.  ESR 44, CRP within normal limits.  Rheumatoid factor, CCP negative.  CK within normal limits.  NOAH positive with a titer of 640 and a cytoplasmic pattern.  She had an [facility]ise CTD test done which revealed negative lupus index.  She had a positive Ro 60 IgG antibody as well as positive thyroid antibodies.            Review of Systems   Constitutional:  Positive for fatigue. Negative for chills and fever.   HENT:  Negative for hearing loss, sore throat and tinnitus.         Dry mouth   Eyes:  Negative for pain and visual disturbance.        Dry eyes   Respiratory:  Negative for cough and shortness of breath.    Cardiovascular:  Negative for chest pain and palpitations.   Gastrointestinal:  Positive for constipation. Negative for abdominal pain, nausea and vomiting.   Genitourinary:  Negative for difficulty urinating.   Musculoskeletal:  Positive for arthralgias, back pain and myalgias. Negative for gait problem, joint swelling, neck pain and neck stiffness.   Skin:  Negative for rash.   Neurological:  Positive for numbness (hands) and headaches. Negative for dizziness, seizures and weakness.  "  Psychiatric/Behavioral:  Negative for confusion, decreased concentration and sleep disturbance.      Current Outpatient Medications on File Prior to Visit   Medication Sig Dispense Refill    Cholecalciferol (RA Vitamin D-3) 25 MCG (1000 UT) tablet Take 1 tablet (1,000 Units total) by mouth daily 90 tablet 1    Collagen Hydrolysate, Bovine, POWD Use      dicyclomine (BENTYL) 10 mg capsule take 1 capsule by mouth four times a day before meals and at bedtime 120 capsule 5    esomeprazole (NexIUM) 40 MG capsule take 1 capsule by mouth once daily 30 capsule 5    hydrocortisone 1 % cream Apply topically 3 (three) times a day 56 g 1    rosuvastatin (CRESTOR) 10 MG tablet Take 1 tablet (10 mg total) by mouth daily 100 tablet 3    sertraline (ZOLOFT) 25 mg tablet take 1 tablet by mouth once daily 90 tablet 1    ursodiol (ACTIGALL) 300 mg capsule take 1 capsule by mouth twice a day 180 capsule 1    calcium carbonate (OS-MOLLY) 600 MG tablet Take 1 tablet (600 mg total) by mouth daily (Patient not taking: Reported on 5/6/2024) 90 tablet 1    famotidine (PEPCID) 20 mg tablet Take 1 tablet (20 mg total) by mouth daily at bedtime (Patient not taking: Reported on 4/2/2025) 90 tablet 3    hydrocortisone (ANUSOL-HC) 2.5 % rectal cream Apply topically 2 (two) times a day (Patient not taking: Reported on 9/16/2024) 28 g 0    lidocaine (XYLOCAINE) 5 % ointment Apply topically as needed for mild pain (Patient not taking: Reported on 10/14/2024) 35.44 g 0     No current facility-administered medications on file prior to visit.         Objective   /72 (BP Location: Right arm, Patient Position: Sitting, Cuff Size: Adult)   Pulse 72   Temp 97.6 °F (36.4 °C) (Tympanic)   Ht 5' 3\" (1.6 m)   Wt 64 kg (141 lb 3.2 oz)   LMP  (LMP Unknown)   SpO2 97%   BMI 25.01 kg/m²      Physical Exam  Constitutional:       Appearance: Normal appearance.   Cardiovascular:      Rate and Rhythm: Normal rate and regular rhythm.   Pulmonary:      " Breath sounds: Normal breath sounds.   Musculoskeletal:         General: No swelling or tenderness.   Skin:     General: Skin is warm and dry.   Neurological:      General: No focal deficit present.      Mental Status: She is alert.           Dragon Dictation software was used to dictate this note. It may contain errors with dictating incorrect words/spelling. Please contact provider directly for any questions.

## 2025-04-14 ENCOUNTER — APPOINTMENT (OUTPATIENT)
Dept: LAB | Facility: CLINIC | Age: 70
End: 2025-04-14
Attending: FAMILY MEDICINE
Payer: COMMERCIAL

## 2025-04-14 ENCOUNTER — CLINICAL SUPPORT (OUTPATIENT)
Dept: FAMILY MEDICINE CLINIC | Facility: CLINIC | Age: 70
End: 2025-04-14
Payer: COMMERCIAL

## 2025-04-14 DIAGNOSIS — M35.01 SJOGREN'S SYNDROME WITH KERATOCONJUNCTIVITIS SICCA (HCC): ICD-10-CM

## 2025-04-14 DIAGNOSIS — F41.9 ANXIETY: ICD-10-CM

## 2025-04-14 DIAGNOSIS — I10 PRIMARY HYPERTENSION: ICD-10-CM

## 2025-04-14 DIAGNOSIS — E78.2 MIXED HYPERLIPIDEMIA: ICD-10-CM

## 2025-04-14 DIAGNOSIS — K74.3 PRIMARY BILIARY CHOLANGITIS (HCC): ICD-10-CM

## 2025-04-14 DIAGNOSIS — R73.03 PREDIABETES: ICD-10-CM

## 2025-04-14 DIAGNOSIS — M81.0 AGE-RELATED OSTEOPOROSIS WITHOUT CURRENT PATHOLOGICAL FRACTURE: Primary | ICD-10-CM

## 2025-04-14 LAB
ALBUMIN SERPL BCG-MCNC: 4.2 G/DL (ref 3.5–5)
ALP SERPL-CCNC: 78 U/L (ref 34–104)
ALT SERPL W P-5'-P-CCNC: 11 U/L (ref 7–52)
ANION GAP SERPL CALCULATED.3IONS-SCNC: 9 MMOL/L (ref 4–13)
AST SERPL W P-5'-P-CCNC: 15 U/L (ref 13–39)
BACTERIA UR QL AUTO: ABNORMAL /HPF
BASOPHILS # BLD AUTO: 0.05 THOUSANDS/ÂΜL (ref 0–0.1)
BASOPHILS NFR BLD AUTO: 1 % (ref 0–1)
BILIRUB SERPL-MCNC: 0.54 MG/DL (ref 0.2–1)
BILIRUB UR QL STRIP: NEGATIVE
BUN SERPL-MCNC: 16 MG/DL (ref 5–25)
CALCIUM SERPL-MCNC: 9.4 MG/DL (ref 8.4–10.2)
CHLORIDE SERPL-SCNC: 101 MMOL/L (ref 96–108)
CHOLEST SERPL-MCNC: 231 MG/DL (ref ?–200)
CLARITY UR: CLEAR
CO2 SERPL-SCNC: 29 MMOL/L (ref 21–32)
COLOR UR: YELLOW
CREAT SERPL-MCNC: 0.75 MG/DL (ref 0.6–1.3)
CREAT UR-MCNC: 190.9 MG/DL
EOSINOPHIL # BLD AUTO: 0.09 THOUSAND/ÂΜL (ref 0–0.61)
EOSINOPHIL NFR BLD AUTO: 1 % (ref 0–6)
ERYTHROCYTE [DISTWIDTH] IN BLOOD BY AUTOMATED COUNT: 12.8 % (ref 11.6–15.1)
ERYTHROCYTE [DISTWIDTH] IN BLOOD BY AUTOMATED COUNT: 12.9 % (ref 11.6–15.1)
EST. AVERAGE GLUCOSE BLD GHB EST-MCNC: 108 MG/DL
GFR SERPL CREATININE-BSD FRML MDRD: 82 ML/MIN/1.73SQ M
GLUCOSE P FAST SERPL-MCNC: 96 MG/DL (ref 65–99)
GLUCOSE UR STRIP-MCNC: NEGATIVE MG/DL
HBA1C MFR BLD: 5.4 %
HCT VFR BLD AUTO: 39.6 % (ref 34.8–46.1)
HCT VFR BLD AUTO: 40 % (ref 34.8–46.1)
HDLC SERPL-MCNC: 39 MG/DL
HGB BLD-MCNC: 12.8 G/DL (ref 11.5–15.4)
HGB BLD-MCNC: 12.9 G/DL (ref 11.5–15.4)
HGB UR QL STRIP.AUTO: NEGATIVE
HYALINE CASTS #/AREA URNS LPF: ABNORMAL /LPF
IMM GRANULOCYTES # BLD AUTO: 0.02 THOUSAND/UL (ref 0–0.2)
IMM GRANULOCYTES NFR BLD AUTO: 0 % (ref 0–2)
KETONES UR STRIP-MCNC: NEGATIVE MG/DL
LDLC SERPL CALC-MCNC: 172 MG/DL (ref 0–100)
LEUKOCYTE ESTERASE UR QL STRIP: NEGATIVE
LYMPHOCYTES # BLD AUTO: 2.73 THOUSANDS/ÂΜL (ref 0.6–4.47)
LYMPHOCYTES NFR BLD AUTO: 35 % (ref 14–44)
MCH RBC QN AUTO: 30.5 PG (ref 26.8–34.3)
MCH RBC QN AUTO: 30.6 PG (ref 26.8–34.3)
MCHC RBC AUTO-ENTMCNC: 32 G/DL (ref 31.4–37.4)
MCHC RBC AUTO-ENTMCNC: 32.6 G/DL (ref 31.4–37.4)
MCV RBC AUTO: 94 FL (ref 82–98)
MCV RBC AUTO: 95 FL (ref 82–98)
MICROALBUMIN UR-MCNC: 46.6 MG/L
MICROALBUMIN/CREAT 24H UR: 24 MG/G CREATININE (ref 0–30)
MONOCYTES # BLD AUTO: 0.83 THOUSAND/ÂΜL (ref 0.17–1.22)
MONOCYTES NFR BLD AUTO: 11 % (ref 4–12)
NEUTROPHILS # BLD AUTO: 4.13 THOUSANDS/ÂΜL (ref 1.85–7.62)
NEUTS SEG NFR BLD AUTO: 52 % (ref 43–75)
NITRITE UR QL STRIP: NEGATIVE
NON-SQ EPI CELLS URNS QL MICRO: ABNORMAL /HPF
NONHDLC SERPL-MCNC: 192 MG/DL
NRBC BLD AUTO-RTO: 0 /100 WBCS
PH UR STRIP.AUTO: 6.5 [PH]
PLATELET # BLD AUTO: 368 THOUSANDS/UL (ref 149–390)
PLATELET # BLD AUTO: 375 THOUSANDS/UL (ref 149–390)
PMV BLD AUTO: 10.3 FL (ref 8.9–12.7)
PMV BLD AUTO: 10.4 FL (ref 8.9–12.7)
POTASSIUM SERPL-SCNC: 4.5 MMOL/L (ref 3.5–5.3)
PROT SERPL-MCNC: 8.2 G/DL (ref 6.4–8.4)
PROT UR STRIP-MCNC: ABNORMAL MG/DL
RBC # BLD AUTO: 4.2 MILLION/UL (ref 3.81–5.12)
RBC # BLD AUTO: 4.21 MILLION/UL (ref 3.81–5.12)
RBC #/AREA URNS AUTO: ABNORMAL /HPF
SODIUM SERPL-SCNC: 139 MMOL/L (ref 135–147)
SP GR UR STRIP.AUTO: 1.02 (ref 1–1.03)
TRIGL SERPL-MCNC: 102 MG/DL (ref ?–150)
TSH SERPL DL<=0.05 MIU/L-ACNC: 2.01 UIU/ML (ref 0.45–4.5)
UROBILINOGEN UR STRIP-ACNC: <2 MG/DL
WBC # BLD AUTO: 7.77 THOUSAND/UL (ref 4.31–10.16)
WBC # BLD AUTO: 7.85 THOUSAND/UL (ref 4.31–10.16)
WBC #/AREA URNS AUTO: ABNORMAL /HPF

## 2025-04-14 PROCEDURE — 81001 URINALYSIS AUTO W/SCOPE: CPT

## 2025-04-14 PROCEDURE — 80061 LIPID PANEL: CPT

## 2025-04-14 PROCEDURE — 96372 THER/PROPH/DIAG INJ SC/IM: CPT | Performed by: FAMILY MEDICINE

## 2025-04-14 PROCEDURE — 85027 COMPLETE CBC AUTOMATED: CPT

## 2025-04-14 PROCEDURE — 85025 COMPLETE CBC W/AUTO DIFF WBC: CPT

## 2025-04-14 PROCEDURE — 82570 ASSAY OF URINE CREATININE: CPT

## 2025-04-14 PROCEDURE — 82043 UR ALBUMIN QUANTITATIVE: CPT

## 2025-04-14 PROCEDURE — 83036 HEMOGLOBIN GLYCOSYLATED A1C: CPT

## 2025-04-14 PROCEDURE — 84443 ASSAY THYROID STIM HORMONE: CPT

## 2025-04-14 PROCEDURE — 80053 COMPREHEN METABOLIC PANEL: CPT

## 2025-04-14 PROCEDURE — 36415 COLL VENOUS BLD VENIPUNCTURE: CPT

## 2025-04-20 DIAGNOSIS — K21.00 GASTROESOPHAGEAL REFLUX DISEASE WITH ESOPHAGITIS WITHOUT HEMORRHAGE: ICD-10-CM

## 2025-04-21 ENCOUNTER — RESULTS FOLLOW-UP (OUTPATIENT)
Dept: FAMILY MEDICINE CLINIC | Facility: CLINIC | Age: 70
End: 2025-04-21

## 2025-04-21 RX ORDER — FAMOTIDINE 20 MG/1
20 TABLET, FILM COATED ORAL
Qty: 90 TABLET | Refills: 1 | Status: SHIPPED | OUTPATIENT
Start: 2025-04-21

## 2025-05-12 ENCOUNTER — OFFICE VISIT (OUTPATIENT)
Dept: FAMILY MEDICINE CLINIC | Facility: CLINIC | Age: 70
End: 2025-05-12
Payer: COMMERCIAL

## 2025-05-12 VITALS
TEMPERATURE: 97.3 F | OXYGEN SATURATION: 98 % | HEIGHT: 63 IN | RESPIRATION RATE: 16 BRPM | SYSTOLIC BLOOD PRESSURE: 127 MMHG | WEIGHT: 138.6 LBS | DIASTOLIC BLOOD PRESSURE: 71 MMHG | HEART RATE: 80 BPM | BODY MASS INDEX: 24.56 KG/M2

## 2025-05-12 DIAGNOSIS — E78.2 MIXED HYPERLIPIDEMIA: Primary | ICD-10-CM

## 2025-05-12 DIAGNOSIS — R73.03 PREDIABETES: ICD-10-CM

## 2025-05-12 DIAGNOSIS — I10 PRIMARY HYPERTENSION: ICD-10-CM

## 2025-05-12 DIAGNOSIS — F41.9 ANXIETY: ICD-10-CM

## 2025-05-12 PROCEDURE — G2211 COMPLEX E/M VISIT ADD ON: HCPCS | Performed by: FAMILY MEDICINE

## 2025-05-12 PROCEDURE — 99214 OFFICE O/P EST MOD 30 MIN: CPT | Performed by: FAMILY MEDICINE

## 2025-05-12 RX ORDER — SERTRALINE HYDROCHLORIDE 25 MG/1
25 TABLET, FILM COATED ORAL DAILY
Qty: 100 TABLET | Refills: 3 | Status: SHIPPED | OUTPATIENT
Start: 2025-05-12

## 2025-05-12 RX ORDER — ESTRADIOL 0.1 MG/G
CREAM VAGINAL
COMMUNITY
Start: 2024-11-25

## 2025-05-12 NOTE — ASSESSMENT & PLAN NOTE
Controlled and much improved.  Glucose was 96 and her A1c now in April was 5.4 when it was 5.6 in October last year and 5.8 in July last year.  Advised patient to continue cutting down her starches sweets and fats.  Exercise as tolerated.  Recheck her A1c in 6 months.  Orders:  •  Comprehensive metabolic panel; Future

## 2025-05-12 NOTE — ASSESSMENT & PLAN NOTE
Controlled and stable.  Patient denies SI HI.  Patient briefly counseled.  And patient is med is refilled as per her request.  Orders:  •  sertraline (ZOLOFT) 25 mg tablet; Take 1 tablet (25 mg total) by mouth daily

## 2025-05-12 NOTE — ASSESSMENT & PLAN NOTE
Controlled.  Continue current therapy.  Low-sodium diet.  Hydrate well.  Orders:  •  Comprehensive metabolic panel; Future

## 2025-05-12 NOTE — ASSESSMENT & PLAN NOTE
Not controlled.  Also not improved.  LFTs normal.  Total cholesterol now is 231 when it was 167 in October last year and 237 in July last year, triglycerides are 102 now when they are 134, HDL went up to 39 from 37 and her LDL went up to 172 now when it was 103 October last year and 171 in July last year.  Her FLP from April of this year going back to all the way of September 2021 was discussed with patient and her daughter.  And they were requested to find out that she due to improved her cholesterol from July last year till October last year.  As well as find out what she is doing from October last year till now that he has gotten worse again.  Patient asserts that she is still taking the Crestor 10 mg at night.  Advised patient strongly to cut out the fats in her diet.  Exercise as tolerated.  Continue current therapy.  Recheck labs below again in 3 months.  And if not improved then to consider increasing her therapy.  Orders:  •  Lipid panel; Future  •  Comprehensive metabolic panel; Future

## 2025-05-12 NOTE — PROGRESS NOTES
Name: Felicitas Stevens      : 1955      MRN: [id number]  Encounter Provider: Norberto Musa MD  Encounter Date: 2025   Encounter department: [facility]  :  Assessment & Plan  Mixed hyperlipidemia  Not controlled.  Also not improved.  LFTs normal.  Total cholesterol now is 231 when it was 167 in October last year and 237 in July last year, triglycerides are 102 now when they are 134, HDL went up to 39 from 37 and her LDL went up to 172 now when it was 103 October last year and 171 in July last .  Her FLP from April of this year going back to all the way of 2021 was discussed with patient and her daughter.  And they were requested to find out that she due to improved her cholesterol from July last year till October last year.  As well as find out what she is doing from October last year till now that he has gotten worse again.  Patient asserts that she is still taking the Crestor 10 mg at night.  Advised patient strongly to cut out the fats in her diet.  Exercise as tolerated.  Continue current therapy.  Recheck labs below again in 3 months.  And if not improved then to consider increasing her therapy.  Orders:  •  Lipid panel; Future  •  Comprehensive metabolic panel; Future    Primary hypertension  Controlled.  Continue current therapy.  Low-sodium diet.  Hydrate well.  Orders:  •  Comprehensive metabolic panel; Future    Prediabetes  Controlled and much improved.  Glucose was 96 and her A1c now in April was 5.4 when it was 5.6 in October last year and 5.8 in July last .  Advised patient to continue cutting down her starches sweets and fats.  Exercise as tolerated.  Recheck her A1c in 6 months.  Orders:  •  Comprehensive metabolic panel; Future    Anxiety  Controlled and stable.  Patient denies SI HI.  Patient briefly counseled.  And patient is med is refilled as per her request.  Orders:  •  sertraline (ZOLOFT) 25 mg tablet; Take 1 tablet (25 mg  "total) by mouth daily          RTO in 3 months for Medicare annual well visit and lipid follow-up.  Patient will do blood work a week before.  Patient can see me prior to that for anything else in between.         History of Present Illness   69-year-old female, accompanied by her daughter, here for an evaluation of her prediabetes, hyperlipidemia and hypertension.  Patient did blood work and urine in April.  Patient is also requesting refill on her Zoloft.  Patient does have anxiety.  Patient denies SI HI.  Patient denies tobacco.      Review of Systems   Constitutional:  Negative for chills, fatigue, fever and unexpected weight change.   HENT:  Negative for congestion and sore throat.    Eyes:  Negative for visual disturbance.   Respiratory:  Negative for cough and shortness of breath.    Cardiovascular:  Negative for chest pain and palpitations.   Gastrointestinal:  Negative for abdominal pain.   Genitourinary:  Negative for dysuria and hematuria.   Neurological:  Negative for dizziness and headaches.   Psychiatric/Behavioral:  Negative for dysphoric mood, self-injury and suicidal ideas. The patient is nervous/anxious.        Objective   /71 (BP Location: Left arm, Patient Position: Sitting, Cuff Size: Standard)   Pulse 80   Temp (!) 97.3 °F (36.3 °C) (Temporal)   Resp 16   Ht 5' 3\" (1.6 m)   Wt 62.9 kg (138 lb 9.6 oz)   LMP  (LMP Unknown)   SpO2 98%   BMI 24.55 kg/m²      Physical Exam  Vitals reviewed.   Constitutional:       General: She is not in acute distress.     Appearance: Normal appearance. She is normal weight. She is not ill-appearing.   Neck:      Vascular: No carotid bruit.   Cardiovascular:      Rate and Rhythm: Normal rate.   Pulmonary:      Effort: Pulmonary effort is normal.      Breath sounds: Normal breath sounds.   Musculoskeletal:      Right lower leg: No edema.      Left lower leg: No edema.   Neurological:      General: No focal deficit present.      Mental Status: She is " alert and oriented to person, place, and time.   Psychiatric:         Mood and Affect: Mood normal.         Behavior: Behavior normal.         Thought Content: Thought content normal.

## 2025-06-08 ENCOUNTER — TELEPHONE (OUTPATIENT)
Dept: OTHER | Facility: OTHER | Age: 70
End: 2025-06-08

## 2025-06-09 NOTE — TELEPHONE ENCOUNTER
Patient is calling regarding cancelling an appointment.    Date/Time: 6/9/2025 9:20 AM     Patient was rescheduled: YES [] NO [x]    Patient requesting call back to reschedule: YES [x] NO []

## 2025-06-12 NOTE — TELEPHONE ENCOUNTER
Spoke with Otoniel and let her know the blood work is on file and her Mother should complete it prior to f/u appt.